# Patient Record
Sex: FEMALE | Race: BLACK OR AFRICAN AMERICAN | NOT HISPANIC OR LATINO | Employment: OTHER | URBAN - METROPOLITAN AREA
[De-identification: names, ages, dates, MRNs, and addresses within clinical notes are randomized per-mention and may not be internally consistent; named-entity substitution may affect disease eponyms.]

---

## 2017-01-16 ENCOUNTER — OFFICE VISIT (OUTPATIENT)
Dept: CARDIOLOGY | Facility: CLINIC | Age: 62
End: 2017-01-16
Payer: COMMERCIAL

## 2017-01-16 VITALS
HEART RATE: 64 BPM | HEIGHT: 64 IN | SYSTOLIC BLOOD PRESSURE: 132 MMHG | WEIGHT: 135.81 LBS | DIASTOLIC BLOOD PRESSURE: 76 MMHG | BODY MASS INDEX: 23.18 KG/M2

## 2017-01-16 DIAGNOSIS — I10 ESSENTIAL HYPERTENSION: ICD-10-CM

## 2017-01-16 PROCEDURE — 99999 PR PBB SHADOW E&M-EST. PATIENT-LVL II: CPT | Mod: PBBFAC,,, | Performed by: INTERNAL MEDICINE

## 2017-01-16 PROCEDURE — 99203 OFFICE O/P NEW LOW 30 MIN: CPT | Mod: S$GLB,,, | Performed by: INTERNAL MEDICINE

## 2017-01-16 PROCEDURE — 1159F MED LIST DOCD IN RCRD: CPT | Mod: S$GLB,,, | Performed by: INTERNAL MEDICINE

## 2017-01-16 PROCEDURE — 3075F SYST BP GE 130 - 139MM HG: CPT | Mod: S$GLB,,, | Performed by: INTERNAL MEDICINE

## 2017-01-16 PROCEDURE — 3078F DIAST BP <80 MM HG: CPT | Mod: S$GLB,,, | Performed by: INTERNAL MEDICINE

## 2017-01-16 RX ORDER — LISINOPRIL AND HYDROCHLOROTHIAZIDE 12.5; 2 MG/1; MG/1
1 TABLET ORAL DAILY
Qty: 90 TABLET | Refills: 3 | Status: SHIPPED | OUTPATIENT
Start: 2017-01-16 | End: 2018-01-22 | Stop reason: SDUPTHER

## 2017-01-16 RX ORDER — METOPROLOL SUCCINATE 50 MG/1
50 TABLET, EXTENDED RELEASE ORAL DAILY
Qty: 90 TABLET | Refills: 3 | Status: SHIPPED | OUTPATIENT
Start: 2017-01-16 | End: 2018-02-02 | Stop reason: SDUPTHER

## 2017-01-16 NOTE — PROGRESS NOTES
Subjective:    Patient ID:  Marga Pak is a 61 y.o. female who presents for evaluation of Mitral Valve Prolapse and Hypertension      HPI  She comes with no complaints, no chest pain, no shortness of breath  Previously seen by cardiologist in [place]  BP well controlled    Review of Systems   Constitution: Negative for decreased appetite, weakness, malaise/fatigue, weight gain and weight loss.   Cardiovascular: Negative for chest pain, dyspnea on exertion, leg swelling, palpitations and syncope.   Respiratory: Negative for cough and shortness of breath.    Gastrointestinal: Negative.    All other systems reviewed and are negative.       Objective:    Physical Exam   Constitutional: She is oriented to person, place, and time. She appears well-developed and well-nourished.   HENT:   Head: Normocephalic.   Eyes: Pupils are equal, round, and reactive to light.   Neck: Normal range of motion. Neck supple. No JVD present. Carotid bruit is not present. No thyromegaly present.   Cardiovascular: Normal rate, regular rhythm, normal heart sounds, intact distal pulses and normal pulses.  PMI is not displaced.  Exam reveals no gallop.    No murmur heard.  Pulmonary/Chest: Effort normal and breath sounds normal.   Abdominal: Soft. Normal appearance. She exhibits no mass. There is no hepatosplenomegaly. There is no tenderness.   Musculoskeletal: Normal range of motion. She exhibits no edema.   Neurological: She is alert and oriented to person, place, and time. She has normal strength and normal reflexes. No sensory deficit.   Skin: Skin is warm and intact.   Psychiatric: She has a normal mood and affect.   Nursing note and vitals reviewed.        Assessment:       1. Essential hypertension         Plan:     Continue all cardiac medications  Regular exercise program  Weight loss  6 m f/u with echo, ekg

## 2017-02-06 RX ORDER — ATORVASTATIN CALCIUM 20 MG/1
20 TABLET, FILM COATED ORAL DAILY
Qty: 90 TABLET | Refills: 3 | Status: SHIPPED | OUTPATIENT
Start: 2017-02-06 | End: 2019-12-31 | Stop reason: SDUPTHER

## 2017-02-16 ENCOUNTER — TELEPHONE (OUTPATIENT)
Dept: OBSTETRICS AND GYNECOLOGY | Facility: CLINIC | Age: 62
End: 2017-02-16

## 2017-02-16 NOTE — TELEPHONE ENCOUNTER
----- Message from Rosaura Pastor sent at 2/16/2017 11:20 AM CST -----  Contact: self: [phone]  Patient is calling because she needs a refill on Premarin 0.9 mg. Patient asks to please call this in for her at:      [email or web address] [address] [place] [address] [facility] [place] [address] [facility] [address]  Phone: [phone] Fax: [phone]    Patient has started sweating again.

## 2017-06-06 RX ORDER — FAMOTIDINE 20 MG/1
TABLET, FILM COATED ORAL
Qty: 30 TABLET | Refills: 0 | Status: SHIPPED | OUTPATIENT
Start: 2017-06-06 | End: 2017-11-14

## 2017-07-21 ENCOUNTER — CLINICAL SUPPORT (OUTPATIENT)
Dept: CARDIOLOGY | Facility: CLINIC | Age: 62
End: 2017-07-21
Payer: COMMERCIAL

## 2017-07-21 DIAGNOSIS — I10 ESSENTIAL HYPERTENSION: ICD-10-CM

## 2017-07-21 LAB
DIASTOLIC DYSFUNCTION: NO
ESTIMATED PA SYSTOLIC PRESSURE: 35.46
MITRAL VALVE REGURGITATION: NORMAL
RETIRED EF AND QEF - SEE NOTES: 70 (ref 55–65)
TRICUSPID VALVE REGURGITATION: NORMAL

## 2017-07-21 PROCEDURE — 93306 TTE W/DOPPLER COMPLETE: CPT | Mod: S$GLB,,, | Performed by: INTERNAL MEDICINE

## 2017-07-25 ENCOUNTER — TELEPHONE (OUTPATIENT)
Dept: CARDIOLOGY | Facility: CLINIC | Age: 62
End: 2017-07-25

## 2017-07-25 DIAGNOSIS — I10 ESSENTIAL HYPERTENSION: Primary | ICD-10-CM

## 2017-07-25 NOTE — TELEPHONE ENCOUNTER
----- Message from Leilani Alvarez sent at 7/25/2017 11:34 AM CDT -----  Contact: self  Patient called regarding missed call. Please contact [phone] (prsr)

## 2017-11-14 ENCOUNTER — HOSPITAL ENCOUNTER (OUTPATIENT)
Dept: RADIOLOGY | Facility: HOSPITAL | Age: 62
Discharge: HOME | End: 2017-11-14
Attending: SPECIALIST
Payer: COMMERCIAL

## 2017-11-14 ENCOUNTER — OFFICE VISIT (OUTPATIENT)
Dept: OBSTETRICS AND GYNECOLOGY | Facility: CLINIC | Age: 62
End: 2017-11-14
Payer: COMMERCIAL

## 2017-11-14 VITALS — HEIGHT: 64 IN | BODY MASS INDEX: 24.24 KG/M2 | WEIGHT: 142 LBS

## 2017-11-14 VITALS — BODY MASS INDEX: 24.35 KG/M2 | WEIGHT: 142.63 LBS | HEIGHT: 64 IN

## 2017-11-14 DIAGNOSIS — Z12.31 VISIT FOR SCREENING MAMMOGRAM: ICD-10-CM

## 2017-11-14 DIAGNOSIS — Z12.31 VISIT FOR SCREENING MAMMOGRAM: Primary | ICD-10-CM

## 2017-11-14 DIAGNOSIS — Z80.3 FAMILY HISTORY OF BREAST CANCER: ICD-10-CM

## 2017-11-14 PROCEDURE — 99999 PR PBB SHADOW E&M-EST. PATIENT-LVL III: CPT | Mod: PBBFAC,,, | Performed by: SPECIALIST

## 2017-11-14 PROCEDURE — 77067 SCR MAMMO BI INCL CAD: CPT | Mod: 26,,, | Performed by: RADIOLOGY

## 2017-11-14 PROCEDURE — 77063 BREAST TOMOSYNTHESIS BI: CPT | Mod: 26,,, | Performed by: RADIOLOGY

## 2017-11-14 PROCEDURE — 99213 OFFICE O/P EST LOW 20 MIN: CPT | Mod: S$GLB,,, | Performed by: SPECIALIST

## 2017-11-14 PROCEDURE — 77067 SCR MAMMO BI INCL CAD: CPT | Mod: TC

## 2017-11-14 RX ORDER — LEVOCETIRIZINE DIHYDROCHLORIDE 5 MG/1
TABLET, FILM COATED ORAL
Refills: 6 | COMMUNITY
Start: 2017-11-07

## 2017-11-14 RX ORDER — PROMETHAZINE HYDROCHLORIDE AND CODEINE PHOSPHATE 6.25; 1 MG/5ML; MG/5ML
SOLUTION ORAL
COMMUNITY
Start: 2017-11-07 | End: 2018-05-22

## 2017-11-14 RX ORDER — CEPHALEXIN 500 MG/1
CAPSULE ORAL
COMMUNITY
Start: 2017-11-07 | End: 2018-05-22

## 2017-11-14 NOTE — PROGRESS NOTES
63 yo BF presents for annual gyn evaluation. No overt gyn complaints.  Significant family history breast Ca sister.  I discussed screening ammo and breast density and possible indication for breast MRI due to family history.    Past Medical History:   Diagnosis Date    Colon torsion     Gastroparesis     GERD (gastroesophageal reflux disease)     Hyperlipidemia     Hypertension        Past Surgical History:   Procedure Laterality Date    ABDOMINAL SURGERY      APPENDECTOMY      HYSTERECTOMY      OOPHORECTOMY         Family History   Problem Relation Age of Onset    Breast cancer Sister 58    Cancer Maternal Grandmother      stomach    Ovarian cancer Maternal Grandmother        Social History     Social History    Marital status:      Spouse name: N/A    Number of children: N/A    Years of education: N/A     Social History Main Topics    Smoking status: Never Smoker    Smokeless tobacco: Never Used    Alcohol use No    Drug use: No    Sexual activity: Not Asked     Other Topics Concern    None     Social History Narrative    None       Current Outpatient Prescriptions   Medication Sig Dispense Refill    atorvastatin (LIPITOR) 20 MG tablet Take 1 tablet (20 mg total) by mouth once daily. 90 tablet 3    estrogens, conjugated, (PREMARIN) 0.9 MG Tab Take 1 tablet (0.9 mg total) by mouth once daily. 90 tablet 2    lisinopril-hydrochlorothiazide (PRINZIDE,ZESTORETIC) 20-12.5 mg per tablet Take 1 tablet by mouth once daily. 90 tablet 3    metoprolol succinate (TOPROL-XL) 50 MG 24 hr tablet Take 1 tablet (50 mg total) by mouth once daily. 90 tablet 3    cephALEXin (KEFLEX) 500 MG capsule       levocetirizine (XYZAL) 5 MG tablet TK 1 T PO  QAM PRF SINUS ALLERGY OR DRIP  6    promethazine-codeine 6.25-10 mg/5 ml (PHENERGAN WITH CODEINE) 6.25-10 mg/5 mL syrup        No current facility-administered medications for this visit.        Review of patient's allergies indicates:   Allergen  Reactions    Bee sting [allergen ext-venom-honey bee] Swelling    Iodine and iodide containing products Palpitations       Review of System:   General: no chills, fever, night sweats, weight gain or weight loss  Psychological: no depression or suicidal ideation  Breasts: no new or changing breast lumps, nipple discharge or masses.  Respiratory: no cough, shortness of breath, or wheezing  Cardiovascular: no chest pain or dyspnea on exertion  Gastrointestinal: no abdominal pain, change in bowel habits, or black or bloody stools  Genito-Urinary: no incontinence, urinary frequency/urgency or vulvar/vaginal symptoms, pelvic pain or abnormal vaginal bleeding.  Musculoskeletal: no gait disturbance or muscular weakness    PE:   APPEARANCE: Well nourished, well developed, in no acute distress.  NECK: Neck symmetric without masses or thyromegaly.  NODES: No inguinal lymph node enlargement.  ABDOMEN: Soft. No tenderness or masses. No hepatosplenomegaly. No hernias.  BREASTS: Symmetrical, no skin changes or visible lesions. No palpable masses, nipple discharge or adenopathy bilaterally.  PELVIC: Normal external female genitalia without lesions. Normal hair distribution. Adequate perineal body, normal urethral meatus. Vagina moist and well rugated without lesions or discharge. No significant cystocele or rectocele. Uterus and cervix surgically absent. Bimanual exam revealed no masses, tenderness or abnormality.      Plan Mammo screening today  I will order breast MRI if inconclusive screening mammogram today  Discussed monthly BSE  RTO 1 year/prn

## 2017-11-16 ENCOUNTER — TELEPHONE (OUTPATIENT)
Dept: RADIOLOGY | Facility: HOSPITAL | Age: 62
End: 2017-11-16

## 2017-11-21 ENCOUNTER — HOSPITAL ENCOUNTER (OUTPATIENT)
Dept: RADIOLOGY | Facility: HOSPITAL | Age: 62
Discharge: HOME | End: 2017-11-21
Attending: SPECIALIST
Payer: COMMERCIAL

## 2017-11-21 DIAGNOSIS — R92.8 ABNORMAL MAMMOGRAM OF LEFT BREAST: ICD-10-CM

## 2017-11-21 PROCEDURE — 76642 ULTRASOUND BREAST LIMITED: CPT | Mod: 26,LT,, | Performed by: RADIOLOGY

## 2017-11-21 PROCEDURE — 77061 BREAST TOMOSYNTHESIS UNI: CPT | Mod: 26,LT,, | Performed by: RADIOLOGY

## 2017-11-21 PROCEDURE — 77061 BREAST TOMOSYNTHESIS UNI: CPT | Mod: TC,LT

## 2017-11-21 PROCEDURE — 76642 ULTRASOUND BREAST LIMITED: CPT | Mod: TC,PO,LT

## 2017-11-21 PROCEDURE — 77065 DX MAMMO INCL CAD UNI: CPT | Mod: 26,LT,, | Performed by: RADIOLOGY

## 2018-01-22 RX ORDER — LISINOPRIL AND HYDROCHLOROTHIAZIDE 12.5; 2 MG/1; MG/1
TABLET ORAL
Qty: 90 TABLET | Refills: 0 | Status: SHIPPED | OUTPATIENT
Start: 2018-01-22 | End: 2018-08-22 | Stop reason: SDUPTHER

## 2018-02-02 RX ORDER — METOPROLOL SUCCINATE 50 MG/1
TABLET, EXTENDED RELEASE ORAL
Qty: 90 TABLET | Refills: 0 | Status: SHIPPED | OUTPATIENT
Start: 2018-02-02 | End: 2018-05-22 | Stop reason: SDUPTHER

## 2018-05-22 ENCOUNTER — OFFICE VISIT (OUTPATIENT)
Dept: CARDIOLOGY | Facility: CLINIC | Age: 63
End: 2018-05-22
Payer: COMMERCIAL

## 2018-05-22 VITALS
HEART RATE: 68 BPM | SYSTOLIC BLOOD PRESSURE: 152 MMHG | BODY MASS INDEX: 24.46 KG/M2 | HEIGHT: 64 IN | WEIGHT: 143.31 LBS | DIASTOLIC BLOOD PRESSURE: 82 MMHG

## 2018-05-22 DIAGNOSIS — I10 ESSENTIAL HYPERTENSION: Primary | ICD-10-CM

## 2018-05-22 DIAGNOSIS — E78.2 MIXED HYPERLIPIDEMIA: ICD-10-CM

## 2018-05-22 PROBLEM — E78.5 HYPERLIPIDEMIA: Status: ACTIVE | Noted: 2018-05-22

## 2018-05-22 PROCEDURE — 3077F SYST BP >= 140 MM HG: CPT | Mod: CPTII,S$GLB,, | Performed by: INTERNAL MEDICINE

## 2018-05-22 PROCEDURE — 99999 PR PBB SHADOW E&M-EST. PATIENT-LVL II: CPT | Mod: PBBFAC,,, | Performed by: INTERNAL MEDICINE

## 2018-05-22 PROCEDURE — 3008F BODY MASS INDEX DOCD: CPT | Mod: CPTII,S$GLB,, | Performed by: INTERNAL MEDICINE

## 2018-05-22 PROCEDURE — 99214 OFFICE O/P EST MOD 30 MIN: CPT | Mod: S$GLB,,, | Performed by: INTERNAL MEDICINE

## 2018-05-22 PROCEDURE — 3079F DIAST BP 80-89 MM HG: CPT | Mod: CPTII,S$GLB,, | Performed by: INTERNAL MEDICINE

## 2018-05-22 RX ORDER — METOPROLOL SUCCINATE 50 MG/1
TABLET, EXTENDED RELEASE ORAL
Qty: 90 TABLET | Refills: 3 | Status: SHIPPED | OUTPATIENT
Start: 2018-05-22 | End: 2020-02-13

## 2018-05-22 NOTE — PROGRESS NOTES
Subjective:    Patient ID:  Marga Pak is a 63 y.o. female who presents for follow-up of hypertension    HPI  She comes with no complaints, no chest pain, no shortness of breath  Off toprol last 3 weeks    Review of Systems   Constitution: Negative for decreased appetite, weakness, malaise/fatigue, weight gain and weight loss.   Cardiovascular: Negative for chest pain, dyspnea on exertion, leg swelling, palpitations and syncope.   Respiratory: Negative for cough and shortness of breath.    Gastrointestinal: Negative.    All other systems reviewed and are negative.       Objective:    Physical Exam   Constitutional: She is oriented to person, place, and time. She appears well-developed and well-nourished.   HENT:   Head: Normocephalic.   Eyes: Pupils are equal, round, and reactive to light.   Neck: Normal range of motion. Neck supple. No JVD present. Carotid bruit is not present. No thyromegaly present.   Cardiovascular: Normal rate, regular rhythm, normal heart sounds, intact distal pulses and normal pulses.  PMI is not displaced.  Exam reveals no gallop.    No murmur heard.  Pulmonary/Chest: Effort normal and breath sounds normal.   Abdominal: Soft. Normal appearance. She exhibits no mass. There is no hepatosplenomegaly. There is no tenderness.   Musculoskeletal: Normal range of motion. She exhibits no edema.   Neurological: She is alert and oriented to person, place, and time. She has normal strength and normal reflexes. No sensory deficit.   Skin: Skin is warm and intact.   Psychiatric: She has a normal mood and affect.   Nursing note and vitals reviewed.        Assessment:       1. Essential hypertension    2. Mixed hyperlipidemia         Plan:     Continue all cardiac medications  Regular exercise program  Resume toprol  Follow up in 1 yr

## 2018-08-01 ENCOUNTER — HOSPITAL ENCOUNTER (EMERGENCY)
Facility: HOSPITAL | Age: 63
Discharge: HOME | End: 2018-08-01
Attending: EMERGENCY MEDICINE
Payer: COMMERCIAL

## 2018-08-01 VITALS
SYSTOLIC BLOOD PRESSURE: 152 MMHG | WEIGHT: 137 LBS | DIASTOLIC BLOOD PRESSURE: 68 MMHG | RESPIRATION RATE: 20 BRPM | HEIGHT: 64 IN | BODY MASS INDEX: 23.39 KG/M2 | OXYGEN SATURATION: 100 % | HEART RATE: 66 BPM | TEMPERATURE: 98 F

## 2018-08-01 DIAGNOSIS — M25.562 PAIN AND SWELLING OF LEFT KNEE: Primary | ICD-10-CM
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
## 2018-08-01 DIAGNOSIS — M25.462 PAIN AND SWELLING OF LEFT KNEE: Primary | ICD-10-CM

## 2018-08-01 PROCEDURE — 99283 EMERGENCY DEPT VISIT LOW MDM: CPT | Mod: ,,, | Performed by: EMERGENCY MEDICINE

## 2018-08-01 PROCEDURE — 99283 EMERGENCY DEPT VISIT LOW MDM: CPT | Mod: 25

## 2018-08-01 PROCEDURE — 25000003 PHARM REV CODE 250: Performed by: EMERGENCY MEDICINE

## 2018-08-01 RX ORDER — BACITRACIN ZINC 500 UNIT/G
OINTMENT IN PACKET (EA) TOPICAL
Status: COMPLETED | OUTPATIENT
Start: 2018-08-01 | End: 2018-08-01

## 2018-08-01 RX ADMIN — BACITRACIN ZINC: 500 OINTMENT TOPICAL at 09:08

## 2018-08-02 NOTE — ED PROVIDER NOTES
Encounter Date: 8/1/2018    SCRIBE #1 NOTE: I, Michelle Otto, am scribing for, and in the presence of,  Dr. Hong. I have scribed the following portions of the note - Other sections scribed: HPI ROS PE MDM.       History     Chief Complaint   Patient presents with    Fall     Pt fell off a ladder earlier today and fell onto her left side. Pt reports left knee pain. 1inch laceration noted to left knee with swelling to the area. Pt unable to bend knee.      Time patient was seen by the provider: 9:20 PM      The patient is a 63 y.o. female who presents to the ED after she fell off of a 3 foot ladder earlier today while cleaning leaves off of her roof and landed on her left side, but did not hit the ground or have any LOC. Injury occurred at about 1830. Patient reports left knee pain with a 1 inch superficial abrasion/laceration noted to the left knee with mild associated edema. Patient states she is unable to bend the knee completely without some discomfort. Patient is able to ambulate but reports soreness and swelling. No family history of blood clots. Pain rated at 4/10 worse with ambulation and direct palpation of her knee. There is no open fracture.       The history is provided by the patient and medical records.     Review of patient's allergies indicates:   Allergen Reactions    Bee sting [allergen ext-venom-honey bee] Swelling    Iodine and iodide containing products Palpitations     Past Medical History:   Diagnosis Date    Colon torsion     Gastroparesis     GERD (gastroesophageal reflux disease)     Hyperlipidemia     Hypertension      Past Surgical History:   Procedure Laterality Date    ABDOMINAL SURGERY      APPENDECTOMY      HYSTERECTOMY      OOPHORECTOMY       Family History   Problem Relation Age of Onset    Breast cancer Sister 58    Cancer Maternal Grandmother         stomach    Ovarian cancer Maternal Grandmother      Social History   Substance Use Topics    Smoking status:  Never Smoker    Smokeless tobacco: Never Used    Alcohol use No     Review of Systems   Constitutional: Negative.    HENT: Negative.    Respiratory: Negative.    Cardiovascular: Negative.    Gastrointestinal: Negative.    Genitourinary: Negative.    Musculoskeletal:        Left knee edema and pain   Skin:        1 inch abrasion to left knee    Neurological: Negative.    Psychiatric/Behavioral: Negative.        Physical Exam     Initial Vitals [08/01/18 2057]   BP Pulse Resp Temp SpO2   (!) 152/68 66 20 98.4 °F (36.9 °C) 100 %      MAP       --         Physical Exam    Nursing note and vitals reviewed.    Gen/Constitutional: Interactive. No acute distress  Head: Normocephalic, Atraumatic  Neck: supple, no masses or LAD  Eyes: PERRLA, conjunctiva clear  Ears, Nose and Throat: No rhinorrhea or stridor.  Cardiac: Reg Rhythm, No murmur  Pulmonary: CTA Bilat, no wheezes, rhonchi, rales.  GI: Abdomen soft, non-tender, non-distended; no rebound or guarding  : No CVA tenderness.  Musculoskeletal: Mild pain with knee flexion, no pain along the lateral or medial joint line, + soft tissue swelling; Negative Lachman, Negative anterior and posterior drawer tests. No patellar instability. No midline cervical, thoracic or lumbar spinal tenderness.   Skin: Superficial abrasion to left knee  Neuro: Alert and Oriented x 3; No focal motor or sensory deficits.    Psych: Normal affect    ED Course   Procedures     Imaging Results          X-Ray Knee 3 View Left (Final result)  Result time 08/01/18 22:30:50    Final result by Simone Guadarrama MD (08/01/18 22:30:50)                 Impression:      No acute fracture.      Electronically signed by: Simone Guadarrama MD  Date:    08/01/2018  Time:    22:30             Narrative:    EXAMINATION:  XR KNEE 3 VIEW LEFT    CLINICAL HISTORY:  Pain in left knee    TECHNIQUE:  AP, lateral, and Merchant views of the left knee were performed.    COMPARISON:  None    FINDINGS:  No fracture or  dislocation.  Enthesopathy at the quadriceps tendon insertion on the superior patella.  No joint effusion.  Mild tricompartment degenerative change.                                 Medical Decision Making:   History:   Old Medical Records: I decided to obtain old medical records.  Initial Assessment:   63 year old female presents after fall with superficial laceration, edema, and pain to left knee  Differential Diagnosis:   My differential diagnosis includes fracture, dislocation, contusion, laceration, sprain  Clinical Tests:   Radiological Study: Ordered and Reviewed    This is a well appearing female no acute distress. Vital signs stable with no acute traumatic injuries. Afebrile, with no bony deformities. There is a small abrasion across the left which is superficial and does not need repair. Negative Lachmans's, anterior and posterior drawer test. There is some swelling but able to fully extend and flex the left knee. X-rays of the left knee were obtained with no acute fracture or dislocation. No patellar instability. Patient was given instructions for rest, ice, compression and elevation along with OTC Tylenol/ibuprofen for pain management. A referral for orthopedic surgery/sports medicine was given if her symptoms do not improve in the next few days. Patient agreeable to discharge plan. Strict ED precautions and return instructions discussed at length and patient verbalized understanding. All questions were answered and ample time was given for questions.  Patient was given crutches and teaching prior to discharge.               Scribe Attestation:   Scribe #1: I performed the above scribed service and the documentation accurately describes the services I performed. I attest to the accuracy of the note.    I, Dr. Ryland Hong, personally performed the services described in this documentation. All medical record entries made by the scribe were at my direction and in my presence.  I have reviewed the chart  and agree that the record reflects my personal performance and is accurate and complete.              Clinical Impression:   The primary encounter diagnosis was Pain and swelling of left knee. A diagnosis of Fall from ladder, initial encounter was also pertinent to this visit.      Disposition:   Disposition: Discharged  Condition: Stable       Ryland Hong DO  Dept of Emergency Medicine   [facility]  Spectralink: [id number]                   Ryland Hong DO  08/02/18 8294

## 2018-08-02 NOTE — ED TRIAGE NOTES
Marga Pak, a 63 y.o. female presents to the ED      Chief Complaint   Patient presents with    Fall     Pt fell off a ladder earlier today and fell onto her left side. Pt reports left knee pain. 1inch laceration noted to left knee with swelling to the area. Pt unable to bend knee.      Review of patient's allergies indicates:   Allergen Reactions    Bee sting [allergen ext-venom-honey bee] Swelling    Iodine and iodide containing products Palpitations     Past Medical History:   Diagnosis Date    Colon torsion     Gastroparesis     GERD (gastroesophageal reflux disease)     Hyperlipidemia     Hypertension

## 2018-08-02 NOTE — DISCHARGE INSTRUCTIONS
Use ice, anti-inflammatory medications such as Tylenol or ibuprofen for the next 3-4 days as well as elevate your extremity and use compression as needed.  Follow up with Orthopedic surgery if your symptoms do not improve this week.  If you have any worsening symptoms, more swelling, fevers, chills or inability to walk, return to the emergency department.

## 2018-08-06 ENCOUNTER — OFFICE VISIT (OUTPATIENT)
Dept: ORTHOPEDICS | Facility: CLINIC | Age: 63
End: 2018-08-06
Payer: COMMERCIAL

## 2018-08-06 VITALS — WEIGHT: 137 LBS | BODY MASS INDEX: 23.39 KG/M2 | HEIGHT: 64 IN

## 2018-08-06 DIAGNOSIS — S80.02XA CONTUSION OF LEFT KNEE, INITIAL ENCOUNTER: Primary | ICD-10-CM

## 2018-08-06 PROCEDURE — 99203 OFFICE O/P NEW LOW 30 MIN: CPT | Mod: S$GLB,,, | Performed by: ORTHOPAEDIC SURGERY

## 2018-08-06 PROCEDURE — 3008F BODY MASS INDEX DOCD: CPT | Mod: CPTII,S$GLB,, | Performed by: ORTHOPAEDIC SURGERY

## 2018-08-06 PROCEDURE — 99999 PR PBB SHADOW E&M-EST. PATIENT-LVL II: CPT | Mod: PBBFAC,,, | Performed by: ORTHOPAEDIC SURGERY

## 2018-08-07 NOTE — PROGRESS NOTES
DATE: 8/6/2018  PATIENT: Marga Pak  REFERRING MD:  CHIEF COMPLAINT:   Chief Complaint   Patient presents with    Left Knee - Pain       HISTORY:  Marga Pak is a 63 y.o. female  who presents for initial evaluation of left knee injury.  States she is well until 08/01/2018 when she fell 8 ft off a roof.  She landed on her leg but is not sure exactly how she landed.  She went to the emergency room on 08/02/2018.  X-rays were negative for fracture. Orthopedic follow-up was recommended.  She is able to weightbear but with pain. She noted swelling medially. She notes pain with movement.  She notes pain with standing for prolonged period of time. She notes an abrasion anteriorly as far swelling. Pain is reported at 4/10.  She is employed as an /contractor.  States her pain worsens at work.  She denies any previous injuries to her leg.       PAST MEDICAL/SURGICAL HISTORY:  Past Medical History:   Diagnosis Date    Colon torsion     Gastroparesis     GERD (gastroesophageal reflux disease)     Hyperlipidemia     Hypertension      Past Surgical History:   Procedure Laterality Date    ABDOMINAL SURGERY      APPENDECTOMY      HYSTERECTOMY      OOPHORECTOMY         Current Medications:   Current Outpatient Prescriptions:     atorvastatin (LIPITOR) 20 MG tablet, Take 1 tablet (20 mg total) by mouth once daily., Disp: 90 tablet, Rfl: 3    estrogens, conjugated, (PREMARIN) 0.9 MG Tab, Take 1 tablet (0.9 mg total) by mouth once daily., Disp: 90 tablet, Rfl: 2    levocetirizine (XYZAL) 5 MG tablet, TK 1 T PO  QAM PRF SINUS ALLERGY OR DRIP, Disp: , Rfl: 6    lisinopril-hydrochlorothiazide (PRINZIDE,ZESTORETIC) 20-12.5 mg per tablet, TAKE 1 TABLET BY MOUTH EVERY DAY, Disp: 90 tablet, Rfl: 0    metoprolol succinate (TOPROL-XL) 50 MG 24 hr tablet, TAKE 1 TABLET(50 MG) BY MOUTH EVERY DAY, Disp: 90 tablet, Rfl: 3    Family History: family history was reviewed and is noncontributory  Social  "History:   Social History     Social History    Marital status:      Spouse name: N/A    Number of children: N/A    Years of education: N/A     Occupational History    Not on file.     Social History Main Topics    Smoking status: Never Smoker    Smokeless tobacco: Never Used    Alcohol use No    Drug use: No    Sexual activity: Not on file     Other Topics Concern    Not on file     Social History Narrative    No narrative on file       ROS:  Constitution: Negative for chills, fever, and sweats. Negative for unexplained weight loss.  HENT: Negative for headaches and blurry vision.   Cardiovascular: Negative for chest pain, irregular heartbeat, leg swelling and palpitations.   Respiratory: Negative for cough and shortness of breath.   Gastrointestinal: Negative for abdominal pain, heartburn, nausea and vomiting.   Genitourinary: Negative for bladder incontinence and dysuria.   Musculoskeletal: Negative for systemic arthritis, muscle weakness and myalgias.   Neurological: Negative for numbness.   Psychiatric/Behavioral: Negative for depression.  Endocrine: Negative for polyuria.   Hematologic/Lymphatic: Negative for bleeding disorders.   Skin: Negative for poor wound healing.        PHYSICAL EXAM:  Ht 5' 4" (1.626 m)   Wt 62.1 kg (137 lb)   BMI 23.52 kg/m²   Marga Pak is a well developed, well nourished female in no acute distress. Physical examination of the left knee evaluated the following:    Gait and Alignment  Inspection for ecchymosis, swelling, atrophy, or deformity  Inspection for intra-articular and/or bursal effusions  Tenderness to palpation over the bony and soft tissue structures around the knee  Range of Motion and presence of extensor lag/contractures  Sensation and motor strength  Varus/valgus or anterior/posterior/rotatory instability  Flexion pinch and Mira's Tests  Patellar alignment/tracking/pain to palpation  Vascular exam to include skin " temperature/color/capillary refill    Remarkable findings included:  Healing abrasion about the anterior knee.  There is moderate size hematoma just lateral to the tibial tubercle. No effusion of the knee. Range of motion 0-130 degrees of flexion. No instability to varus or valgus stress.  Lachman's and anterior drawer are negative.  Flexion pinch produces anterior tightness.  Mira's test negative        IMAGING:   X-rays of the left knee are personally reviewed.  No acute fractures or dislocations are seen. No significant degenerative changes noted. No calcifications or loose bodies identified      ASSESSMENT:   Contusion left knee    PLAN:  The nature of the diagnosis, using models and diagrams when appropriate, was explained to the patient in detail.Treatment option discussed included observation, physical therapy, MRI.. All questions answered and the patient wishes to observe her symptoms over the next 2 weeks.  Should she continue to remain symptomatic, she will contact the office for repeat exam mortise schedule an MRI of the left knee to rule out intra-articular pathology.  Follow up if not improving or worse..      This note was dictated using voice recognition software. Please excuse any grammatical or typographical errors.

## 2018-08-23 ENCOUNTER — OFFICE VISIT (OUTPATIENT)
Dept: ORTHOPEDICS | Facility: CLINIC | Age: 63
End: 2018-08-23
Payer: COMMERCIAL

## 2018-08-23 VITALS — HEIGHT: 64 IN | BODY MASS INDEX: 23.39 KG/M2 | WEIGHT: 137 LBS

## 2018-08-23 DIAGNOSIS — S80.02XD CONTUSION OF LEFT KNEE, SUBSEQUENT ENCOUNTER: Primary | ICD-10-CM

## 2018-08-23 PROCEDURE — 99213 OFFICE O/P EST LOW 20 MIN: CPT | Mod: S$GLB,,, | Performed by: ORTHOPAEDIC SURGERY

## 2018-08-23 PROCEDURE — 99999 PR PBB SHADOW E&M-EST. PATIENT-LVL II: CPT | Mod: PBBFAC,,, | Performed by: ORTHOPAEDIC SURGERY

## 2018-08-23 PROCEDURE — 3008F BODY MASS INDEX DOCD: CPT | Mod: CPTII,S$GLB,, | Performed by: ORTHOPAEDIC SURGERY

## 2018-08-23 RX ORDER — LISINOPRIL AND HYDROCHLOROTHIAZIDE 12.5; 2 MG/1; MG/1
TABLET ORAL
Qty: 90 TABLET | Refills: 0 | Status: SHIPPED | OUTPATIENT
Start: 2018-08-23 | End: 2018-11-20 | Stop reason: SDUPTHER

## 2018-08-23 NOTE — PROGRESS NOTES
"DATE: 8/23/2018  PATIENT: Marga Pak    Attending Physician: Jan Watt M.D.    HISTORY:  Marga Pak is a 63 y.o. female who returns for follow up evaluation of  her left knee. She sustained a contusion on 08/01/2018 when she fell 8 ft off a roof.  She does report improvement.  However she still notes mild swelling anteriorly.  She cannot kneel however she is walking more comfortably.  Pain is reported at 2/10 today.    PMH/PSH/FamHx/SocHx:  Reviewed and unchanged from prior visit    ROS:  Constitution: Negative for chills, fever, and sweats. Negative for unexplained weight loss.  HENT: Negative for headaches and blurry vision.   Cardiovascular: Negative for chest pain, irregular heartbeat, leg swelling and palpitations.   Respiratory: Negative for cough and shortness of breath.   Gastrointestinal: Negative for abdominal pain, heartburn, nausea and vomiting.   Genitourinary: Negative for bladder incontinence and dysuria.   Musculoskeletal: Negative for systemic arthritis, joint swelling, muscle weakness and myalgias.   Neurological: Negative for numbness.   Psychiatric/Behavioral: Negative for depression.   Endocrine: Negative for polyuria.   Hematologic/Lymphatic: Negative for bleeding disorders.  Skin: Negative for poor wound healing.       EXAM:  Ht 5' 4" (1.626 m)   Wt 62.1 kg (137 lb)   BMI 23.52 kg/m²   Marga Pak is a well developed, well nourished female in no acute distress. Physical examination of the left knee evaluated the following:    Gait and Alignment  Inspection for ecchymosis, swelling, atrophy, or deformity  Inspection for intra-articular and/or bursal effusions  Tenderness to palpation over the bony and soft tissue structures around the knee  Range of Motion and presence of extensor lag/contractures  Sensation and motor strength  Varus/valgus or anterior/posterior/rotatory instability  Flexion pinch and Mira's Tests  Patellar alignment/tracking/pain to " palpation  Vascular exam to include skin temperature/color/capillary refill    Remarkable findings included:  Abrasion is healing.  There is small infrapatellar bursal effusion. Range of motion the knee is full to flexion extension. No intra-articular effusion noted.  Mild tenderness palpation anteriorly over the infrapatellar bursa is identified.      IMAGING:   No x-rays are performed today.    ASSESSMENT:  Contusion left knee, D.O. I 08/01/2018    PLAN:  The implications of the patient's evolution of symptoms and findings were explained to the patient in detail. Baby is doing better.  I have explained that it can take another 2-4 weeks for the infrapatellar bursal effusion to it resolved.  However she may perform activities as tolerated.  I would go recommend that she avoid kneeling for another 2-4 weeks.  Should she have further problems I will see her back.  Otherwise, follow-up as needed    This note was dictated using voice recognition software. Please excuse any grammatical or typographical errors.

## 2018-11-02 ENCOUNTER — HOSPITAL ENCOUNTER (EMERGENCY)
Facility: OTHER | Age: 63
Discharge: HOME | End: 2018-11-02
Attending: EMERGENCY MEDICINE
Payer: COMMERCIAL

## 2018-11-02 VITALS
OXYGEN SATURATION: 98 % | DIASTOLIC BLOOD PRESSURE: 61 MMHG | SYSTOLIC BLOOD PRESSURE: 130 MMHG | RESPIRATION RATE: 18 BRPM | WEIGHT: 140 LBS | BODY MASS INDEX: 23.9 KG/M2 | HEIGHT: 64 IN | TEMPERATURE: 98 F | HEART RATE: 64 BPM

## 2018-11-02 DIAGNOSIS — R11.2 NON-INTRACTABLE VOMITING WITH NAUSEA, UNSPECIFIED VOMITING TYPE: Primary | ICD-10-CM

## 2018-11-02 LAB
ALBUMIN SERPL BCP-MCNC: 4.5 G/DL
ALP SERPL-CCNC: 64 U/L
ALT SERPL W/O P-5'-P-CCNC: 12 U/L
ANION GAP SERPL CALC-SCNC: 13 MMOL/L
AST SERPL-CCNC: 25 U/L
BASOPHILS # BLD AUTO: 0.04 K/UL
BASOPHILS NFR BLD: 0.3 %
BILIRUB SERPL-MCNC: 0.6 MG/DL
BUN SERPL-MCNC: 17 MG/DL
CALCIUM SERPL-MCNC: 10.1 MG/DL
CHLORIDE SERPL-SCNC: 100 MMOL/L
CO2 SERPL-SCNC: 26 MMOL/L
CREAT SERPL-MCNC: 0.8 MG/DL
DIFFERENTIAL METHOD: ABNORMAL
EOSINOPHIL # BLD AUTO: 0 K/UL
EOSINOPHIL NFR BLD: 0.1 %
ERYTHROCYTE [DISTWIDTH] IN BLOOD BY AUTOMATED COUNT: 12.8 %
EST. GFR  (AFRICAN AMERICAN): >60 ML/MIN/1.73 M^2
EST. GFR  (NON AFRICAN AMERICAN): >60 ML/MIN/1.73 M^2
GLUCOSE SERPL-MCNC: 105 MG/DL
HCT VFR BLD AUTO: 46.9 %
HGB BLD-MCNC: 15.9 G/DL
LIPASE SERPL-CCNC: 11 U/L
LYMPHOCYTES # BLD AUTO: 1.8 K/UL
LYMPHOCYTES NFR BLD: 13.1 %
MCH RBC QN AUTO: 31.2 PG
MCHC RBC AUTO-ENTMCNC: 33.9 G/DL
MCV RBC AUTO: 92 FL
MONOCYTES # BLD AUTO: 0.3 K/UL
MONOCYTES NFR BLD: 2.4 %
NEUTROPHILS # BLD AUTO: 11.5 K/UL
NEUTROPHILS NFR BLD: 83.7 %
PLATELET # BLD AUTO: 281 K/UL
PMV BLD AUTO: 10.5 FL
POTASSIUM SERPL-SCNC: 3.5 MMOL/L
PROT SERPL-MCNC: 8.3 G/DL
RBC # BLD AUTO: 5.1 M/UL
SODIUM SERPL-SCNC: 139 MMOL/L
WBC # BLD AUTO: 13.77 K/UL

## 2018-11-02 PROCEDURE — 96361 HYDRATE IV INFUSION ADD-ON: CPT

## 2018-11-02 PROCEDURE — 96375 TX/PRO/DX INJ NEW DRUG ADDON: CPT

## 2018-11-02 PROCEDURE — 96372 THER/PROPH/DIAG INJ SC/IM: CPT | Mod: 59

## 2018-11-02 PROCEDURE — 25000003 PHARM REV CODE 250: Performed by: EMERGENCY MEDICINE

## 2018-11-02 PROCEDURE — 80053 COMPREHEN METABOLIC PANEL: CPT

## 2018-11-02 PROCEDURE — 83690 ASSAY OF LIPASE: CPT

## 2018-11-02 PROCEDURE — 99284 EMERGENCY DEPT VISIT MOD MDM: CPT | Mod: 25

## 2018-11-02 PROCEDURE — 63600175 PHARM REV CODE 636 W HCPCS: Performed by: EMERGENCY MEDICINE

## 2018-11-02 PROCEDURE — 96374 THER/PROPH/DIAG INJ IV PUSH: CPT

## 2018-11-02 PROCEDURE — 85025 COMPLETE CBC W/AUTO DIFF WBC: CPT

## 2018-11-02 RX ORDER — ONDANSETRON 2 MG/ML
8 INJECTION INTRAMUSCULAR; INTRAVENOUS ONCE
Status: COMPLETED | OUTPATIENT
Start: 2018-11-02 | End: 2018-11-02

## 2018-11-02 RX ORDER — PROMETHAZINE HYDROCHLORIDE 25 MG/1
25 SUPPOSITORY RECTAL EVERY 6 HOURS PRN
Qty: 10 SUPPOSITORY | Refills: 0 | Status: ON HOLD | OUTPATIENT
Start: 2018-11-02 | End: 2020-11-12 | Stop reason: HOSPADM

## 2018-11-02 RX ORDER — KETOROLAC TROMETHAMINE 30 MG/ML
15 INJECTION, SOLUTION INTRAMUSCULAR; INTRAVENOUS
Status: COMPLETED | OUTPATIENT
Start: 2018-11-02 | End: 2018-11-02

## 2018-11-02 RX ORDER — ONDANSETRON 4 MG/1
4 TABLET, ORALLY DISINTEGRATING ORAL EVERY 6 HOURS PRN
Qty: 21 TABLET | Refills: 0 | Status: ON HOLD | OUTPATIENT
Start: 2018-11-02 | End: 2020-11-12 | Stop reason: HOSPADM

## 2018-11-02 RX ORDER — DICYCLOMINE HYDROCHLORIDE 10 MG/ML
20 INJECTION INTRAMUSCULAR
Status: COMPLETED | OUTPATIENT
Start: 2018-11-02 | End: 2018-11-02

## 2018-11-02 RX ADMIN — ONDANSETRON 8 MG: 2 INJECTION INTRAMUSCULAR; INTRAVENOUS at 10:11

## 2018-11-02 RX ADMIN — DICYCLOMINE HYDROCHLORIDE 20 MG: 10 INJECTION INTRAMUSCULAR at 10:11

## 2018-11-02 RX ADMIN — SODIUM CHLORIDE 1000 ML: 0.9 INJECTION, SOLUTION INTRAVENOUS at 10:11

## 2018-11-02 RX ADMIN — KETOROLAC TROMETHAMINE 15 MG: 30 INJECTION, SOLUTION INTRAMUSCULAR at 10:11

## 2018-11-02 NOTE — ED PROVIDER NOTES
Encounter Date: 11/2/2018    SCRIBE #1 NOTE: Karis DAWSON am scribing for, and in the presence of, Dr. Zhong.       History     Chief Complaint   Patient presents with    Vomiting     Pt reports vomiting, diarrhea and abd pain since last night.     Diarrhea    Abdominal Pain     Time seen by provider: 9:52 AM    This is a 63 y.o. female with hx of HTN, colon torsion, and gastroparesis who presents with complaint of N/V/D since yesterday. Pt reports associated cramping abdominal pain. She reports taking phenergan pills and suppository without relief. She last ate a [employer] sandwich prior to onset of sx. She has had hx of similar sx with colon torsion. She denies any recent international travel. She also denies any fever, chills, blood in stool, blood in vomitus, chest pain, SOB, urinary sx, lightheadedness, weakness, and back pain.      The history is provided by the patient.     Review of patient's allergies indicates:   Allergen Reactions    Bee sting [allergen ext-venom-honey bee] Swelling    Iodine and iodide containing products Palpitations     Past Medical History:   Diagnosis Date    Colon torsion     Gastroparesis     GERD (gastroesophageal reflux disease)     Hyperlipidemia     Hypertension      Past Surgical History:   Procedure Laterality Date    ABDOMINAL SURGERY      APPENDECTOMY      COLONOSCOPY N/A 8/13/2014    Performed by Robb Kwong Jr., MD at [facility]    HYSTERECTOMY      OOPHORECTOMY       Family History   Problem Relation Age of Onset    Breast cancer Sister 58    Cancer Maternal Grandmother         stomach    Ovarian cancer Maternal Grandmother      Social History     Tobacco Use    Smoking status: Never Smoker    Smokeless tobacco: Never Used   Substance Use Topics    Alcohol use: No    Drug use: No     Review of Systems   Constitutional: Negative for chills, fatigue and fever.   HENT: Negative for congestion, rhinorrhea and sore throat.    Respiratory:  Negative for cough and shortness of breath.    Cardiovascular: Negative for chest pain.   Gastrointestinal: Positive for abdominal pain, diarrhea, nausea and vomiting. Negative for blood in stool.        Negative for blood in vomitus.   Endocrine: Negative for polyuria.   Genitourinary: Negative for decreased urine volume and dysuria.   Musculoskeletal: Negative for back pain.   Skin: Negative for rash.   Allergic/Immunologic: Negative for immunocompromised state.   Neurological: Negative for dizziness, weakness and light-headedness.   Hematological: Does not bruise/bleed easily.   Psychiatric/Behavioral: Negative for confusion.       Physical Exam     Initial Vitals [11/02/18 0930]   BP Pulse Resp Temp SpO2   (!) 179/84 72 18 97.9 °F (36.6 °C) 99 %      MAP       --         Physical Exam    Nursing note and vitals reviewed.  Constitutional: She appears well-developed and well-nourished. She is cooperative.  Non-toxic appearance. She appears distressed.   Appears uncomfortable and nauseated.   HENT:   Head: Normocephalic and atraumatic.   Dry mucous membranes.   Eyes: Conjunctivae and EOM are normal. Pupils are equal, round, and reactive to light.   Neck: Normal range of motion and full passive range of motion without pain. Neck supple. No thyromegaly present. No JVD present.   Cardiovascular: Normal rate, regular rhythm, normal heart sounds and normal pulses.   Pulmonary/Chest: Effort normal and breath sounds normal. No respiratory distress.   Abdominal: Soft. Normal appearance and bowel sounds are normal. She exhibits no distension and no mass. There is no tenderness.   Musculoskeletal: Normal range of motion.   Neurological: She is alert and oriented to person, place, and time. She has normal strength. No cranial nerve deficit or sensory deficit.   Skin: Skin is warm, dry and intact. No rash noted.   Psychiatric: She has a normal mood and affect. Her speech is normal and behavior is normal. Judgment and thought  content normal.         ED Course   Procedures  Labs Reviewed   CBC W/ AUTO DIFFERENTIAL - Abnormal; Notable for the following components:       Result Value    WBC 13.77 (*)     MCH 31.2 (*)     Gran # (ANC) 11.5 (*)     Gran% 83.7 (*)     Lymph% 13.1 (*)     Mono% 2.4 (*)     All other components within normal limits   COMPREHENSIVE METABOLIC PANEL   LIPASE   URINALYSIS, REFLEX TO URINE CULTURE          Imaging Results    None          Medical Decision Making:   Initial Assessment:   Urgent evaluation a 63-year-old female with mitral valve prolapse, and hypertension here with complaint of nausea, vomiting, and non bloody diarrhea.  Pt has hx of gerd, prior diagnosis of gastroparesis, hx of SBO/appy/hysterectomy.  Patient denies sick contacts or recent travel, had questionable subway sandwich yesterday prior to onset of symptoms.  Patient reports similar episodes of discomfort in the past.  Per epic review, patient has had multiple episodes of chronic abdominal pain and gastritis previously, improved after lysis of adhesions in past. Here pt appears uncomfortable/neauseated, no abdominal tenderness/distention, mild dry mm. Will obtain labs, admin antiemetics, IVF and reasses.    Clinical Tests:   Lab Tests: Ordered and Reviewed  ED Management:  Mild leukocytosis non specific in setting of vomiting.   Pt feeling much improved after treatment, nausea improved.   Dc home with antiemetics and fu PCP, strict return precautions given.             Scribe Attestation:   Scribe #1: I performed the above scribed service and the documentation accurately describes the services I performed. I attest to the accuracy of the note.    Attending Attestation:           Physician Attestation for Scribe:  Physician Attestation Statement for Scribe #1: I, Dr. Zhong, reviewed documentation, as scribed by Karis Clemons in my presence, and it is both accurate and complete.                    Clinical Impression:     1.  Non-intractable vomiting with nausea, unspecified vomiting type          Disposition:   Disposition: Discharged  Condition: Maura Zhong MD  11/02/18 0131

## 2018-11-20 ENCOUNTER — OFFICE VISIT (OUTPATIENT)
Dept: OBSTETRICS AND GYNECOLOGY | Facility: CLINIC | Age: 63
End: 2018-11-20
Payer: COMMERCIAL

## 2018-11-20 DIAGNOSIS — Z12.31 VISIT FOR SCREENING MAMMOGRAM: Primary | ICD-10-CM

## 2018-11-20 PROCEDURE — 99499 UNLISTED E&M SERVICE: CPT | Mod: S$GLB,,, | Performed by: SPECIALIST

## 2018-11-20 PROCEDURE — 99999 PR PBB SHADOW E&M-EST. PATIENT-LVL I: CPT | Mod: PBBFAC,,, | Performed by: SPECIALIST

## 2018-11-21 RX ORDER — LISINOPRIL AND HYDROCHLOROTHIAZIDE 12.5; 2 MG/1; MG/1
TABLET ORAL
Qty: 90 TABLET | Refills: 0 | Status: SHIPPED | OUTPATIENT
Start: 2018-11-21 | End: 2019-03-12 | Stop reason: SDUPTHER

## 2018-11-28 ENCOUNTER — HOSPITAL ENCOUNTER (OUTPATIENT)
Dept: RADIOLOGY | Facility: HOSPITAL | Age: 63
Discharge: HOME | End: 2018-11-28
Attending: SPECIALIST
Payer: COMMERCIAL

## 2018-11-28 ENCOUNTER — OFFICE VISIT (OUTPATIENT)
Dept: OBSTETRICS AND GYNECOLOGY | Facility: CLINIC | Age: 63
End: 2018-11-28
Payer: COMMERCIAL

## 2018-11-28 VITALS
BODY MASS INDEX: 24.13 KG/M2 | HEIGHT: 64 IN | SYSTOLIC BLOOD PRESSURE: 146 MMHG | DIASTOLIC BLOOD PRESSURE: 84 MMHG | WEIGHT: 141.31 LBS

## 2018-11-28 VITALS — WEIGHT: 141 LBS | BODY MASS INDEX: 24.07 KG/M2 | HEIGHT: 64 IN

## 2018-11-28 DIAGNOSIS — Z12.31 VISIT FOR SCREENING MAMMOGRAM: Primary | ICD-10-CM

## 2018-11-28 DIAGNOSIS — Z12.31 VISIT FOR SCREENING MAMMOGRAM: ICD-10-CM

## 2018-11-28 DIAGNOSIS — Z01.419 GYNECOLOGIC EXAM NORMAL: ICD-10-CM

## 2018-11-28 PROCEDURE — 99999 PR PBB SHADOW E&M-EST. PATIENT-LVL IV: CPT | Mod: PBBFAC,,, | Performed by: SPECIALIST

## 2018-11-28 PROCEDURE — 87624 HPV HI-RISK TYP POOLED RSLT: CPT

## 2018-11-28 PROCEDURE — 88175 CYTOPATH C/V AUTO FLUID REDO: CPT

## 2018-11-28 PROCEDURE — 3008F BODY MASS INDEX DOCD: CPT | Mod: CPTII,S$GLB,, | Performed by: SPECIALIST

## 2018-11-28 PROCEDURE — 77063 BREAST TOMOSYNTHESIS BI: CPT | Mod: 26,,, | Performed by: RADIOLOGY

## 2018-11-28 PROCEDURE — 77067 SCR MAMMO BI INCL CAD: CPT | Mod: 26,,, | Performed by: RADIOLOGY

## 2018-11-28 PROCEDURE — 99396 PREV VISIT EST AGE 40-64: CPT | Mod: S$GLB,,, | Performed by: SPECIALIST

## 2018-11-28 PROCEDURE — 77063 BREAST TOMOSYNTHESIS BI: CPT | Mod: TC,PN

## 2018-11-28 PROCEDURE — 3077F SYST BP >= 140 MM HG: CPT | Mod: CPTII,S$GLB,, | Performed by: SPECIALIST

## 2018-11-28 PROCEDURE — 3079F DIAST BP 80-89 MM HG: CPT | Mod: CPTII,S$GLB,, | Performed by: SPECIALIST

## 2018-11-28 NOTE — PROGRESS NOTES
62 yo BF presents for annual gyn evaluation. No overt gyn complaints.  Past Medical History:   Diagnosis Date    Colon torsion     Gastroparesis     GERD (gastroesophageal reflux disease)     Hyperlipidemia     Hypertension     Sciatica        Past Surgical History:   Procedure Laterality Date    ABDOMINAL SURGERY      APPENDECTOMY      COLONOSCOPY N/A 8/13/2014    Per[facility] at [facility]    HYSTERECTOMY      OOPHORECTOMY         Family History   Problem Relation Age of Onset    Breast cancer Sister 58    Cancer Maternal Grandmother         stomach    Ovarian cancer Maternal Grandmother        Social History     Socioeconomic History    Marital status:      Spouse name: None    Number of children: None    Years of education: None    Highest education level: None   Social Needs    Financial resource strain: None    Food insecurity - worry: None    Food insecurity - inability: None    Transportation needs - medical: None    Transportation needs - non-medical: None   Occupational History    None   Tobacco Use    Smoking status: Never Smoker    Smokeless tobacco: Never Used   Substance and Sexual Activity    Alcohol use: No    Drug use: No    Sexual activity: None   Other Topics Concern    None   Social History Narrative    None       Current Outpatient Medications   Medication Sig Dispense Refill    atorvastatin (LIPITOR) 20 MG tablet Take 1 tablet (20 mg total) by mouth once daily. 90 tablet 3    estrogens, conjugated, (PREMARIN) 0.9 MG Tab Take 1 tablet (0.9 mg total) by mouth once daily. 90 tablet 0    lisinopril-hydrochlorothiazide (PRINZIDE,ZESTORETIC) 20-12.5 mg per tablet TAKE 1 TABLET BY MOUTH EVERY DAY 90 tablet 0    metoprolol succinate (TOPROL-XL) 50 MG 24 hr tablet TAKE 1 TABLET(50 MG) BY MOUTH EVERY DAY 90 tablet 3    levocetirizine (XYZAL) 5 MG tablet TK 1 T PO  QAM PRF SINUS ALLERGY OR DRIP  6    ondansetron (ZOFRAN-ODT) 4 MG TbDL  Take 1 tablet (4 mg total) by mouth every 6 (six) hours as needed (for vomiting or nausea). 21 tablet 0    promethazine (PHENERGAN) 25 MG suppository Place 1 suppository (25 mg total) rectally every 6 (six) hours as needed for Nausea. 10 suppository 0     No current facility-administered medications for this visit.        Review of patient's allergies indicates:   Allergen Reactions    Bee sting [allergen ext-venom-honey bee] Swelling    Iodine and iodide containing products Palpitations       Review of System:   General: no chills, fever, night sweats, weight gain or weight loss  Psychological: no depression or suicidal ideation  Breasts: no new or changing breast lumps, nipple discharge or masses.  Respiratory: no cough, shortness of breath, or wheezing  Cardiovascular: no chest pain or dyspnea on exertion  Gastrointestinal: no abdominal pain, change in bowel habits, or black or bloody stools  Genito-Urinary: no incontinence, urinary frequency/urgency or vulvar/vaginal symptoms, pelvic pain or abnormal vaginal bleeding.  Musculoskeletal: no gait disturbance or muscular weakness    PE:   APPEARANCE: Well nourished, well developed, in no acute distress.  NECK: Neck symmetric without masses or thyromegaly.  NODES: No inguinal lymph node enlargement.  ABDOMEN: Soft. No tenderness or masses. No hepatosplenomegaly. No hernias.  BREASTS: Symmetrical, no skin changes or visible lesions. No palpable masses, nipple discharge or adenopathy bilaterally.  PELVIC: Normal external female genitalia without lesions. Normal hair distribution. Adequate perineal body, normal urethral meatus. Vagina moist and well rugated without lesions or discharge. No significant cystocele or rectocele. Uterus and cervix surgically absent. Bimanual exam revealed no masses, tenderness or abnormality.      COUNSELING:  The patient was counseled today on osteoporosis prevention, calcium supplementation, and regular weight bearing exercise. The  patient was also counseled today on ACS PAP guidelines, with recommendations for screening PAP smear age 21-65 every 3-5 yearsunless their uterus, cervix, and ovaries were removed for benign reasons. Screening with HPV testing ages 30-65 every 5 years as an alternative. The patient was also counseled regarding monthly breast self-examination, routine STD screening for at-risk populations, prophylactic immunizations for transmitted infections such as HPV, Pertussis, or Influenza as appropriate, and yearly mammograms when indicated by ACS guidelines. She was advised to see her primary care physician for all other health maintenance.   FOLLOW-UP with me for next routine visit.

## 2018-12-03 LAB
HPV HR 12 DNA CVX QL NAA+PROBE: NEGATIVE
HPV16 AG SPEC QL: NEGATIVE
HPV18 DNA SPEC QL NAA+PROBE: NEGATIVE

## 2019-01-16 RX ORDER — ESTROGENS, CONJUGATED 0.9 MG/1
TABLET, FILM COATED ORAL
Qty: 90 TABLET | Refills: 0 | Status: SHIPPED | OUTPATIENT
Start: 2019-01-16 | End: 2019-12-31 | Stop reason: SDUPTHER

## 2019-03-13 RX ORDER — LISINOPRIL AND HYDROCHLOROTHIAZIDE 12.5; 2 MG/1; MG/1
TABLET ORAL
Qty: 90 TABLET | Refills: 0 | Status: SHIPPED | OUTPATIENT
Start: 2019-03-13 | End: 2019-05-26 | Stop reason: SDUPTHER

## 2019-05-27 RX ORDER — LISINOPRIL AND HYDROCHLOROTHIAZIDE 12.5; 2 MG/1; MG/1
TABLET ORAL
Qty: 90 TABLET | Refills: 0 | Status: SHIPPED | OUTPATIENT
Start: 2019-05-27 | End: 2020-02-07

## 2019-11-04 ENCOUNTER — TELEPHONE (OUTPATIENT)
Dept: OBSTETRICS AND GYNECOLOGY | Facility: CLINIC | Age: 64
End: 2019-11-04

## 2019-11-04 ENCOUNTER — TELEPHONE (OUTPATIENT)
Dept: CARDIOLOGY | Facility: CLINIC | Age: 64
End: 2019-11-04

## 2019-11-04 NOTE — TELEPHONE ENCOUNTER
----- Message from Meka Malissa sent at 11/4/2019 12:55 PM CST -----  Contact: pt  Type: Needs Medical Advice    Who Called:  pt  Best Call Back Number:     Additional Information: Requesting a call back from Nurse regarding pt is going out of town and wants to be cleared but she was last seen on 11/28/18,please call with advice and answer questions

## 2019-11-04 NOTE — TELEPHONE ENCOUNTER
----- Message from Meka Leonardo sent at 11/4/2019  1:02 PM CST -----  Contact: pt  Type: Needs Medical Advice    Who Called:  pt  Best Call Back Number:     Additional Information: Requesting a call back from Nurse regarding access to a follow up appt pt has not been seen since 05/22/18,please call back with advice

## 2019-11-05 ENCOUNTER — OFFICE VISIT (OUTPATIENT)
Dept: OBSTETRICS AND GYNECOLOGY | Facility: CLINIC | Age: 64
End: 2019-11-05
Payer: COMMERCIAL

## 2019-11-05 DIAGNOSIS — Z01.419 GYNECOLOGIC EXAM NORMAL: Primary | ICD-10-CM

## 2019-11-05 PROCEDURE — 99499 UNLISTED E&M SERVICE: CPT | Mod: S$GLB,,, | Performed by: SPECIALIST

## 2019-11-05 PROCEDURE — 99999 PR PBB SHADOW E&M-EST. PATIENT-LVL I: ICD-10-PCS | Mod: PBBFAC,,, | Performed by: SPECIALIST

## 2019-11-05 PROCEDURE — 99999 PR PBB SHADOW E&M-EST. PATIENT-LVL I: CPT | Mod: PBBFAC,,, | Performed by: SPECIALIST

## 2019-11-05 PROCEDURE — 99499 NO LOS: ICD-10-PCS | Mod: S$GLB,,, | Performed by: SPECIALIST

## 2019-11-20 ENCOUNTER — OFFICE VISIT (OUTPATIENT)
Dept: CARDIOLOGY | Facility: CLINIC | Age: 64
End: 2019-11-20
Payer: COMMERCIAL

## 2019-11-20 ENCOUNTER — PATIENT MESSAGE (OUTPATIENT)
Dept: ADMINISTRATIVE | Facility: OTHER | Age: 64
End: 2019-11-20

## 2019-11-20 ENCOUNTER — TELEPHONE (OUTPATIENT)
Dept: CARDIOLOGY | Facility: CLINIC | Age: 64
End: 2019-11-20

## 2019-11-20 VITALS
SYSTOLIC BLOOD PRESSURE: 143 MMHG | DIASTOLIC BLOOD PRESSURE: 66 MMHG | BODY MASS INDEX: 24.32 KG/M2 | HEIGHT: 64 IN | WEIGHT: 142.44 LBS | HEART RATE: 67 BPM

## 2019-11-20 DIAGNOSIS — E78.2 MIXED HYPERLIPIDEMIA: ICD-10-CM

## 2019-11-20 DIAGNOSIS — I10 ESSENTIAL HYPERTENSION: Primary | ICD-10-CM

## 2019-11-20 PROCEDURE — 99213 OFFICE O/P EST LOW 20 MIN: CPT | Mod: S$GLB,,, | Performed by: INTERNAL MEDICINE

## 2019-11-20 PROCEDURE — 99999 PR PBB SHADOW E&M-EST. PATIENT-LVL III: CPT | Mod: PBBFAC,,, | Performed by: INTERNAL MEDICINE

## 2019-11-20 PROCEDURE — 3078F DIAST BP <80 MM HG: CPT | Mod: CPTII,S$GLB,, | Performed by: INTERNAL MEDICINE

## 2019-11-20 PROCEDURE — 3078F PR MOST RECENT DIASTOLIC BLOOD PRESSURE < 80 MM HG: ICD-10-PCS | Mod: CPTII,S$GLB,, | Performed by: INTERNAL MEDICINE

## 2019-11-20 PROCEDURE — 3077F PR MOST RECENT SYSTOLIC BLOOD PRESSURE >= 140 MM HG: ICD-10-PCS | Mod: CPTII,S$GLB,, | Performed by: INTERNAL MEDICINE

## 2019-11-20 PROCEDURE — 3077F SYST BP >= 140 MM HG: CPT | Mod: CPTII,S$GLB,, | Performed by: INTERNAL MEDICINE

## 2019-11-20 PROCEDURE — 3008F BODY MASS INDEX DOCD: CPT | Mod: CPTII,S$GLB,, | Performed by: INTERNAL MEDICINE

## 2019-11-20 PROCEDURE — 99999 PR PBB SHADOW E&M-EST. PATIENT-LVL III: ICD-10-PCS | Mod: PBBFAC,,, | Performed by: INTERNAL MEDICINE

## 2019-11-20 PROCEDURE — 3008F PR BODY MASS INDEX (BMI) DOCUMENTED: ICD-10-PCS | Mod: CPTII,S$GLB,, | Performed by: INTERNAL MEDICINE

## 2019-11-20 PROCEDURE — 99213 PR OFFICE/OUTPT VISIT, EST, LEVL III, 20-29 MIN: ICD-10-PCS | Mod: S$GLB,,, | Performed by: INTERNAL MEDICINE

## 2019-11-20 NOTE — PROGRESS NOTES
Subjective:    Patient ID:  Marga Pak is a 64 y.o. female who presents for follow-up of hypertension    HPI  She comes with no complaints, no chest pain, no shortness of breath  BP good at home    Review of Systems   Constitution: Negative for decreased appetite, malaise/fatigue, weight gain and weight loss.   Cardiovascular: Negative for chest pain, dyspnea on exertion, leg swelling, palpitations and syncope.   Respiratory: Negative for cough and shortness of breath.    Gastrointestinal: Negative.    Neurological: Negative for weakness.   All other systems reviewed and are negative.       Objective:      Physical Exam   Constitutional: She is oriented to person, place, and time. She appears well-developed and well-nourished.   HENT:   Head: Normocephalic.   Eyes: Pupils are equal, round, and reactive to light.   Neck: Normal range of motion. Neck supple. No JVD present. Carotid bruit is not present. No thyromegaly present.   Cardiovascular: Normal rate, regular rhythm, normal heart sounds, intact distal pulses and normal pulses. PMI is not displaced. Exam reveals no gallop.   No murmur heard.  Pulmonary/Chest: Effort normal and breath sounds normal.   Abdominal: Soft. Normal appearance. She exhibits no mass. There is no hepatosplenomegaly. There is no tenderness.   Musculoskeletal: Normal range of motion. She exhibits no edema.   Neurological: She is alert and oriented to person, place, and time. She has normal strength and normal reflexes. No sensory deficit.   Skin: Skin is warm and intact.   Psychiatric: She has a normal mood and affect.   Nursing note and vitals reviewed.        Assessment:       1. Essential hypertension    2. Mixed hyperlipidemia         Plan:   Refer to digital hypertension program  Continue all cardiac medications  Regular exercise program  Weight loss

## 2019-11-20 NOTE — TELEPHONE ENCOUNTER
----- Message from Yashira Carballo sent at 11/20/2019  8:38 AM CST -----  Contact: pt  Pt calling at 8:23 am states that she is 2 miles from the [facility], [place] [address] Traffic is going at a slow pace. IM the MA no response as to if her being late is okay....[phone] (home)

## 2019-12-17 ENCOUNTER — TELEPHONE (OUTPATIENT)
Dept: OBSTETRICS AND GYNECOLOGY | Facility: CLINIC | Age: 64
End: 2019-12-17

## 2019-12-17 RX ORDER — FAMOTIDINE 20 MG/1
20 TABLET, FILM COATED ORAL 2 TIMES DAILY
Qty: 60 TABLET | Refills: 1 | Status: SHIPPED | OUTPATIENT
Start: 2019-12-17 | End: 2023-05-16

## 2019-12-17 NOTE — TELEPHONE ENCOUNTER
----- Message from Sheron Julian sent at 12/17/2019  1:44 PM CST -----  Contact: self   Patient need a refill on famotidine 20mg twice a day pharmacy is out of 40mg please send to  [facility] pharmacy, any questions please call back at [phone] (home)       Case number [id number]  [facility] pharmacy  [address] [place] [address]  Phone number [phone]

## 2019-12-27 DIAGNOSIS — Z12.31 VISIT FOR SCREENING MAMMOGRAM: Primary | ICD-10-CM

## 2019-12-31 ENCOUNTER — HOSPITAL ENCOUNTER (OUTPATIENT)
Dept: RADIOLOGY | Facility: HOSPITAL | Age: 64
Discharge: HOME | End: 2019-12-31
Attending: SPECIALIST
Payer: COMMERCIAL

## 2019-12-31 ENCOUNTER — OFFICE VISIT (OUTPATIENT)
Dept: OBSTETRICS AND GYNECOLOGY | Facility: CLINIC | Age: 64
End: 2019-12-31
Payer: COMMERCIAL

## 2019-12-31 VITALS — HEIGHT: 64 IN | BODY MASS INDEX: 24.32 KG/M2 | WEIGHT: 142.44 LBS

## 2019-12-31 VITALS
BODY MASS INDEX: 24.82 KG/M2 | WEIGHT: 144.63 LBS | DIASTOLIC BLOOD PRESSURE: 84 MMHG | SYSTOLIC BLOOD PRESSURE: 122 MMHG

## 2019-12-31 DIAGNOSIS — Z12.31 VISIT FOR SCREENING MAMMOGRAM: ICD-10-CM

## 2019-12-31 DIAGNOSIS — Z01.419 ROUTINE GYNECOLOGICAL EXAMINATION: Primary | ICD-10-CM

## 2019-12-31 DIAGNOSIS — Z01.419 GYNECOLOGIC EXAM NORMAL: ICD-10-CM

## 2019-12-31 DIAGNOSIS — Z79.890 HORMONE REPLACEMENT THERAPY (HRT): ICD-10-CM

## 2019-12-31 PROCEDURE — 3074F PR MOST RECENT SYSTOLIC BLOOD PRESSURE < 130 MM HG: ICD-10-PCS | Mod: CPTII,S$GLB,, | Performed by: SPECIALIST

## 2019-12-31 PROCEDURE — 77067 SCR MAMMO BI INCL CAD: CPT | Mod: TC,PN

## 2019-12-31 PROCEDURE — 3079F DIAST BP 80-89 MM HG: CPT | Mod: CPTII,S$GLB,, | Performed by: SPECIALIST

## 2019-12-31 PROCEDURE — 3079F PR MOST RECENT DIASTOLIC BLOOD PRESSURE 80-89 MM HG: ICD-10-PCS | Mod: CPTII,S$GLB,, | Performed by: SPECIALIST

## 2019-12-31 PROCEDURE — 77067 SCR MAMMO BI INCL CAD: CPT | Mod: 26,,, | Performed by: RADIOLOGY

## 2019-12-31 PROCEDURE — 77063 BREAST TOMOSYNTHESIS BI: CPT | Mod: 26,,, | Performed by: RADIOLOGY

## 2019-12-31 PROCEDURE — 3008F PR BODY MASS INDEX (BMI) DOCUMENTED: ICD-10-PCS | Mod: CPTII,S$GLB,, | Performed by: SPECIALIST

## 2019-12-31 PROCEDURE — 77067 MAMMO DIGITAL SCREENING BILAT WITH TOMOSYNTHESIS_CAD: ICD-10-PCS | Mod: 26,,, | Performed by: RADIOLOGY

## 2019-12-31 PROCEDURE — 99396 PR PREVENTIVE VISIT,EST,40-64: ICD-10-PCS | Mod: S$GLB,,, | Performed by: SPECIALIST

## 2019-12-31 PROCEDURE — 99999 PR PBB SHADOW E&M-EST. PATIENT-LVL III: ICD-10-PCS | Mod: PBBFAC,,, | Performed by: SPECIALIST

## 2019-12-31 PROCEDURE — 77063 MAMMO DIGITAL SCREENING BILAT WITH TOMOSYNTHESIS_CAD: ICD-10-PCS | Mod: 26,,, | Performed by: RADIOLOGY

## 2019-12-31 PROCEDURE — 3074F SYST BP LT 130 MM HG: CPT | Mod: CPTII,S$GLB,, | Performed by: SPECIALIST

## 2019-12-31 PROCEDURE — 3008F BODY MASS INDEX DOCD: CPT | Mod: CPTII,S$GLB,, | Performed by: SPECIALIST

## 2019-12-31 PROCEDURE — 99396 PREV VISIT EST AGE 40-64: CPT | Mod: S$GLB,,, | Performed by: SPECIALIST

## 2019-12-31 PROCEDURE — 99999 PR PBB SHADOW E&M-EST. PATIENT-LVL III: CPT | Mod: PBBFAC,,, | Performed by: SPECIALIST

## 2019-12-31 RX ORDER — ATORVASTATIN CALCIUM 20 MG/1
20 TABLET, FILM COATED ORAL DAILY
Qty: 90 TABLET | Refills: 3 | Status: SHIPPED | OUTPATIENT
Start: 2019-12-31 | End: 2021-11-08

## 2019-12-31 NOTE — TELEPHONE ENCOUNTER
----- Message from Sheila Velasquez sent at 12/31/2019 11:50 AM CST -----  Contact: Patient  Type:  RX Refill Request    Who Called:  Patient  Refill or New Rx:  Refill  RX Name and Strength:  atorvastatin (LIPITOR) 20 MG tablet  How is the patient currently taking it? (ex. 1XDay):   Take 1 tablet (20 mg total) by mouth once daily. - Oral  Is this a 30 day or 90 day RX:  90 tablet  Preferred Pharmacy with phone number:    [facility]  [address] [place] [address] [place]  Phone: [phone]  Local or Mail Order:  local  Ordering Provider:  Bill Pedro MD  Best Call Back Number:

## 2019-12-31 NOTE — PROGRESS NOTES
65 yo BF presents for annual gyn evaluation as well as mammo screening and continued ERT management. No overt gyn complaints.  Past Medical History:   Diagnosis Date    Colon torsion     Gastroparesis     GERD (gastroesophageal reflux disease)     Hyperlipidemia     Hypertension     Sciatica        Past Surgical History:   Procedure Laterality Date    ABDOMINAL SURGERY      APPENDECTOMY      HYSTERECTOMY      OOPHORECTOMY         Family History   Problem Relation Age of Onset    Breast cancer Sister 58    Cancer Maternal Grandmother         stomach    Ovarian cancer Maternal Grandmother        Social History     Socioeconomic History    Marital status:      Spouse name: Not on file    Number of children: Not on file    Years of education: Not on file    Highest education level: Not on file   Occupational History    Not on file   Social Needs    Financial resource strain: Not on file    Food insecurity:     Worry: Not on file     Inability: Not on file    Transportation needs:     Medical: Not on file     Non-medical: Not on file   Tobacco Use    Smoking status: Never Smoker    Smokeless tobacco: Never Used   Substance and Sexual Activity    Alcohol use: No    Drug use: No    Sexual activity: Not on file   Lifestyle    Physical activity:     Days per week: Not on file     Minutes per session: Not on file    Stress: Not on file   Relationships    Social connections:     Talks on phone: Not on file     Gets together: Not on file     Attends [employer] service: Not on file     Active member of club or organization: Not on file     Attends meetings of clubs or organizations: Not on file     Relationship status: Not on file   Other Topics Concern    Not on file   Social History Narrative    Not on file       Current Outpatient Medications   Medication Sig Dispense Refill    atorvastatin (LIPITOR) 20 MG tablet Take 1 tablet (20 mg total) by mouth once daily. 90 tablet 3    famotidine  (PEPCID) 20 MG tablet Take 1 tablet (20 mg total) by mouth 2 (two) times daily. 60 tablet 1    levocetirizine (XYZAL) 5 MG tablet TK 1 T PO  QAM PRF SINUS ALLERGY OR DRIP  6    lisinopril-hydrochlorothiazide (PRINZIDE,ZESTORETIC) 20-12.5 mg per tablet TAKE 1 TABLET BY MOUTH EVERY DAY 90 tablet 0    metoprolol succinate (TOPROL-XL) 50 MG 24 hr tablet TAKE 1 TABLET(50 MG) BY MOUTH EVERY DAY 90 tablet 3    ondansetron (ZOFRAN-ODT) 4 MG TbDL Take 1 tablet (4 mg total) by mouth every 6 (six) hours as needed (for vomiting or nausea). 21 tablet 0    PREMARIN 0.9 mg Tab TAKE 1 TABLET(0.9 MG) BY MOUTH EVERY DAY 90 tablet 0    promethazine (PHENERGAN) 25 MG suppository Place 1 suppository (25 mg total) rectally every 6 (six) hours as needed for Nausea. 10 suppository 0     No current facility-administered medications for this visit.        Review of patient's allergies indicates:   Allergen Reactions    Bee sting [allergen ext-venom-honey bee] Swelling    Iodine and iodide containing products Palpitations       Review of System:   General: no chills, fever, night sweats, weight gain or weight loss  Psychological: no depression or suicidal ideation  Breasts: no new or changing breast lumps, nipple discharge or masses.  Respiratory: no cough, shortness of breath, or wheezing  Cardiovascular: no chest pain or dyspnea on exertion  Gastrointestinal: no abdominal pain, change in bowel habits, or black or bloody stools  Genito-Urinary: no incontinence, urinary frequency/urgency or vulvar/vaginal symptoms, pelvic pain or abnormal vaginal bleeding.  Musculoskeletal: no gait disturbance or muscular weakness    PE:   APPEARANCE: Well nourished, well developed, in no acute distress.  NECK: Neck symmetric without masses or thyromegaly.  NODES: No inguinal lymph node enlargement.  ABDOMEN: Soft. No tenderness or masses. No hepatosplenomegaly. No hernias.  BREASTS: Symmetrical, no skin changes or visible lesions. No palpable masses,  nipple discharge or adenopathy bilaterally.  PELVIC: Normal external female genitalia without lesions. Normal hair distribution. Adequate perineal body, normal urethral meatus. Vagina moist and well rugated without lesions or discharge. No significant cystocele or rectocele. Uterus and cervix surgically absent. Bimanual exam revealed no masses, tenderness or abnormality.      Plan BSE monthly        Mammogram today        Refill Premarin  RTO 1 year/prn

## 2020-01-17 ENCOUNTER — PATIENT OUTREACH (OUTPATIENT)
Dept: OTHER | Facility: OTHER | Age: 65
End: 2020-01-17

## 2020-01-17 NOTE — LETTER
February 13, 2020     Marga Pak  [address] [place] [address]       Dear Marga,    Welcome to [facility]! Our goal is to make care effective, proactive and convenient by using data you send us from home to better treat your chronic conditions.              My name is Eli Milligan, and I am your dedicated Digital Medicine clinician. As an expert in medication management, I will help ensure that the medications you are taking continue to provide the intended benefits and help you reach your goals. You can reach me directly at [phone] or by sending me a message directly through your [facility] account.      I am Tamara Alexander and I will be your health . My job is to help you identify lifestyle changes to improve your disease control. We will talk about nutrition, exercise, and other ways you may be able to adjust your current habits to better your health. Additionally, we will help ensure you are completing the tests and screenings that are necessary to help manage your conditions. You can reach me directly at  or by sending me a message directly through your [facility] account.    Most importantly, YOU are at the center of this team. Together, we will work to improve your overall health and encourage you to meet your goals for a healthier lifestyle.     What we expect from YOU:  · Please take frequent home blood pressure measurements. We ask that you take at least 1 blood pressure reading per week, but more information will better help us get you know you. Be sure you rest for a few minutes before taking the reading in a quiet, comfortable place.     Be available to receive phone calls or [employer] messages, when appropriate, from your care team. Please let us know if there are any specific days or times that work best for us to reach you via phone.     Complete routine tests and screenings. Dont worry, we will help keep you on track!           What you should expect from  your Digital Medicine Care Team:   We will work with you to create a personalized plan of care and provide you with encouragement and education, including regarding lifestyle changes, that could help you manage your disease states.     We will adjust your current medications, if needed, and continue to monitor your long-term progress.     We will provide you and your physician with monthly progress reports after you have been in the program for more than 30 days.     We will send you reminders through [employer] and text messages to help ensure you do not miss any testing deadlines to help manage your disease states.    You will be able to reach us by phone or through your [facility] account by clicking our names under Care Team on the right side of the home screen.    I look forward to working with you to achieve your blood pressure goals!    We look forward to working with you to help manage your health,    Sincerely,    Your Digital Medicine Team    Please visit our websites to learn more:   · Hypertension: [email or web address]      Remember, we are not available for emergencies. If you have an emergency, please contact your doctors office directly or call [facility] ([phone] or [phone]) or 911.

## 2020-01-17 NOTE — LETTER
February 13, 2020     Marga Pak  [address] [place] [address]       Dear Marga,    Welcome to [facility]! Our goal is to make care effective, proactive and convenient by using data you send us from home to better treat your chronic conditions.              My name is Eli Milligan, and I am your dedicated Digital Medicine clinician. As an expert in medication management, I will help ensure that the medications you are taking continue to provide the intended benefits and help you reach your goals. You can reach me directly at [phone] or by sending me a message directly through your [facility] account.      I am Tamara Alexander and I will be your health . My job is to help you identify lifestyle changes to improve your disease control. We will talk about nutrition, exercise, and other ways you may be able to adjust your current habits to better your health. Additionally, we will help ensure you are completing the tests and screenings that are necessary to help manage your conditions. You can reach me directly at  or by sending me a message directly through your [facility] account.    Most importantly, YOU are at the center of this team. Together, we will work to improve your overall health and encourage you to meet your goals for a healthier lifestyle.     What we expect from YOU:  · Please take frequent home blood pressure measurements. We ask that you take at least 1 blood pressure reading per week, but more information will better help us get you know you. Be sure you rest for a few minutes before taking the reading in a quiet, comfortable place.     Be available to receive phone calls or [employer] messages, when appropriate, from your care team. Please let us know if there are any specific days or times that work best for us to reach you via phone.     Complete routine tests and screenings. Dont worry, we will help keep you on track!           What you should expect from  your Digital Medicine Care Team:   We will work with you to create a personalized plan of care and provide you with encouragement and education, including regarding lifestyle changes, that could help you manage your disease states.     We will adjust your current medications, if needed, and continue to monitor your long-term progress.     We will provide you and your physician with monthly progress reports after you have been in the program for more than 30 days.     We will send you reminders through [employer] and text messages to help ensure you do not miss any testing deadlines to help manage your disease states.    You will be able to reach us by phone or through your [employer] account by clicking our names under Care Team on the right side of the home screen.    I look forward to working with you to achieve your blood pressure goals!    We look forward to working with you to help manage your health,    Sincerely,    Your Digital Medicine Team    Please visit our websites to learn more:   · Hypertension: [email or web address]      Remember, we are not available for emergencies. If you have an emergency, please contact your doctors office directly or call [facility] ([phone] or [phone]) or 911.

## 2020-01-22 NOTE — PROGRESS NOTES
2nd attempt to complete the enrollment call. No answer, voicemail not setup. Sent portal message.

## 2020-02-05 NOTE — PROGRESS NOTES
Digital Medicine Program Enrollment  HPI   4th attempt for enrollment call.  No answer, left voicemail.  Also sent enrolling provider message.

## 2020-02-06 ENCOUNTER — NURSE TRIAGE (OUTPATIENT)
Dept: ADMINISTRATIVE | Facility: CLINIC | Age: 65
End: 2020-02-06

## 2020-02-06 NOTE — TELEPHONE ENCOUNTER
Reason for Disposition   Second attempt to contact family AND no contact made. Phone number verified.    Protocols used: NO CONTACT OR DUPLICATE CONTACT CALL-A-OH    No answer times 2 attempts and unable to leave a message

## 2020-02-07 ENCOUNTER — OFFICE VISIT (OUTPATIENT)
Dept: CARDIOLOGY | Facility: CLINIC | Age: 65
End: 2020-02-07
Payer: MEDICARE

## 2020-02-07 VITALS
HEIGHT: 64 IN | BODY MASS INDEX: 25.03 KG/M2 | HEART RATE: 63 BPM | SYSTOLIC BLOOD PRESSURE: 152 MMHG | DIASTOLIC BLOOD PRESSURE: 76 MMHG | WEIGHT: 146.63 LBS

## 2020-02-07 DIAGNOSIS — I10 ESSENTIAL HYPERTENSION: ICD-10-CM

## 2020-02-07 DIAGNOSIS — E78.2 MIXED HYPERLIPIDEMIA: ICD-10-CM

## 2020-02-07 PROCEDURE — 3008F BODY MASS INDEX DOCD: CPT | Mod: CPTII,S$GLB,, | Performed by: PHYSICIAN ASSISTANT

## 2020-02-07 PROCEDURE — 3077F PR MOST RECENT SYSTOLIC BLOOD PRESSURE >= 140 MM HG: ICD-10-PCS | Mod: CPTII,S$GLB,, | Performed by: PHYSICIAN ASSISTANT

## 2020-02-07 PROCEDURE — 99999 PR PBB SHADOW E&M-EST. PATIENT-LVL III: CPT | Mod: PBBFAC,,, | Performed by: PHYSICIAN ASSISTANT

## 2020-02-07 PROCEDURE — 1101F PT FALLS ASSESS-DOCD LE1/YR: CPT | Mod: CPTII,S$GLB,, | Performed by: PHYSICIAN ASSISTANT

## 2020-02-07 PROCEDURE — 1101F PR PT FALLS ASSESS DOC 0-1 FALLS W/OUT INJ PAST YR: ICD-10-PCS | Mod: CPTII,S$GLB,, | Performed by: PHYSICIAN ASSISTANT

## 2020-02-07 PROCEDURE — 99214 PR OFFICE/OUTPT VISIT, EST, LEVL IV, 30-39 MIN: ICD-10-PCS | Mod: S$GLB,,, | Performed by: PHYSICIAN ASSISTANT

## 2020-02-07 PROCEDURE — 3077F SYST BP >= 140 MM HG: CPT | Mod: CPTII,S$GLB,, | Performed by: PHYSICIAN ASSISTANT

## 2020-02-07 PROCEDURE — 3078F PR MOST RECENT DIASTOLIC BLOOD PRESSURE < 80 MM HG: ICD-10-PCS | Mod: CPTII,S$GLB,, | Performed by: PHYSICIAN ASSISTANT

## 2020-02-07 PROCEDURE — 99999 PR PBB SHADOW E&M-EST. PATIENT-LVL III: ICD-10-PCS | Mod: PBBFAC,,, | Performed by: PHYSICIAN ASSISTANT

## 2020-02-07 PROCEDURE — 3078F DIAST BP <80 MM HG: CPT | Mod: CPTII,S$GLB,, | Performed by: PHYSICIAN ASSISTANT

## 2020-02-07 PROCEDURE — 3008F PR BODY MASS INDEX (BMI) DOCUMENTED: ICD-10-PCS | Mod: CPTII,S$GLB,, | Performed by: PHYSICIAN ASSISTANT

## 2020-02-07 PROCEDURE — 99214 OFFICE O/P EST MOD 30 MIN: CPT | Mod: S$GLB,,, | Performed by: PHYSICIAN ASSISTANT

## 2020-02-07 RX ORDER — LISINOPRIL AND HYDROCHLOROTHIAZIDE 12.5; 2 MG/1; MG/1
2 TABLET ORAL DAILY
Qty: 180 TABLET | Refills: 3 | Status: SHIPPED | OUTPATIENT
Start: 2020-02-07 | End: 2020-04-01 | Stop reason: SDUPTHER

## 2020-02-07 NOTE — PROGRESS NOTES
"Subjective:    Patient ID:  Marga Pak is a 65 y.o. female who presents for follow-up of HTN.     HPI  Ms. Pak is a very pleasant lady who follows with Dr. Pedro. She presents today for follow up of HTN. She states her BP has been elevated recently. She c/o headaches, though she states they may be secondary to a cold. She took an extra lisinopril-HCT last night and it was better this morning. She states she is currentlty enrolled in Digital HTN, but it appears from the chart that they were having difficulty completing her on-boarding.     Review of Systems   Constitution: Negative for chills, diaphoresis, fever, weight gain and weight loss.   HENT: Negative for sore throat.    Eyes: Negative for blurred vision, vision loss in left eye, vision loss in right eye and visual disturbance.   Cardiovascular: Negative for chest pain, claudication, dyspnea on exertion, leg swelling, near-syncope, orthopnea, palpitations, paroxysmal nocturnal dyspnea and syncope.   Respiratory: Negative for cough, hemoptysis, shortness of breath, sputum production and wheezing.    Endocrine: Negative for cold intolerance and heat intolerance.   Hematologic/Lymphatic: Negative for adenopathy. Does not bruise/bleed easily.   Skin: Negative for rash.   Musculoskeletal: Negative for falls, muscle weakness and myalgias.   Gastrointestinal: Negative for abdominal pain, change in bowel habit, constipation, diarrhea, melena and nausea.   Genitourinary: Negative for bladder incontinence.   Neurological: Negative for dizziness, focal weakness, headaches, light-headedness, numbness and weakness.   Psychiatric/Behavioral: Negative for altered mental status.         Vitals:    02/07/20 1444   BP: (!) 152/76   BP Location: Left arm   Patient Position: Sitting   BP Method: Medium (Automatic)   Pulse: 63   Weight: 66.5 kg (146 lb 9.7 oz)   Height: 5' 4" (1.626 m)     Body mass index is 25.16 kg/m².     Objective:    Physical Exam "   Constitutional: She is oriented to person, place, and time. She appears well-developed and well-nourished. No distress.   HENT:   Head: Normocephalic and atraumatic.   Mouth/Throat: Oropharynx is clear and moist.   Eyes: Pupils are equal, round, and reactive to light. Conjunctivae and EOM are normal. No scleral icterus.   Neck: Neck supple. No JVD present. No tracheal deviation present.   Cardiovascular: Normal rate and regular rhythm. Exam reveals no gallop and no friction rub.   No murmur heard.  Pulmonary/Chest: Effort normal and breath sounds normal. No respiratory distress. She has no wheezes. She has no rales. She exhibits no tenderness.   Abdominal: Soft. Bowel sounds are normal. She exhibits no distension. There is no hepatosplenomegaly. There is no tenderness.   Musculoskeletal: She exhibits no edema or tenderness.   Neurological: She is alert and oriented to person, place, and time.   Skin: Skin is warm and dry. No rash noted. No erythema.   Psychiatric: She has a normal mood and affect. Her behavior is normal.         Assessment:         Essential hypertension  Uncontrolled.   On Toprol XL and Lisinopril-HCTZ.     Hyperlipidemia  Not at goal.   On statin.     Plan:       Increase lisinopril to 40-25mg daily.   Continue Toprol XL 50mg daily.   BMP in 1 week.   F/U with Dr. Pedro in 6 months.

## 2020-02-13 ENCOUNTER — TELEPHONE (OUTPATIENT)
Dept: CARDIOLOGY | Facility: CLINIC | Age: 65
End: 2020-02-13

## 2020-02-13 RX ORDER — AMLODIPINE BESYLATE 2.5 MG/1
2.5 TABLET ORAL DAILY
Qty: 30 TABLET | Refills: 11 | Status: SHIPPED | OUTPATIENT
Start: 2020-02-13 | End: 2020-02-13 | Stop reason: SDUPTHER

## 2020-02-13 RX ORDER — METOPROLOL SUCCINATE 50 MG/1
25 TABLET, EXTENDED RELEASE ORAL DAILY
Qty: 90 TABLET | Refills: 3 | Status: ON HOLD | OUTPATIENT
Start: 2020-02-13 | End: 2020-02-17 | Stop reason: SDUPTHER

## 2020-02-13 RX ORDER — AMLODIPINE BESYLATE 2.5 MG/1
2.5 TABLET ORAL DAILY
Qty: 30 TABLET | Refills: 11 | Status: ON HOLD | OUTPATIENT
Start: 2020-02-13 | End: 2020-02-17 | Stop reason: HOSPADM

## 2020-02-13 NOTE — TELEPHONE ENCOUNTER
Pt. Transfer to Digital HTN program to finish set up and get consult for HTN. Will call back to follow up.

## 2020-02-13 NOTE — TELEPHONE ENCOUNTER
Please advise: pt. Reported slight headache, nausea, weak, and BP has going back up. What to know what to do asked to see BP readings in Digital hypertension program.

## 2020-02-13 NOTE — PROGRESS NOTES
Digital Medicine Program Enrollment      Our goal is to get BP to consistently below 130/80mmHg and make the process convenient so patient can avoid extra trips to the office. Getting your blood pressure below 130/80mmHg (definition of control) will reduce your risk for heart attack, kidney failure, stroke and death (as well as kidney failure, eye disease, & dementia)      Reviewed that the Digital Medicine care team - consisting of a clinician and a health  - will follow the most current evidence-based national guidelines for treating your condition.  The health  will focus on lifestyle modifications and motivation while the clinician will focus on medication therapy.  The care team will review all data on a regular basis and reach out as needed.      Explained that one of the key parts of the program is communication with the care team.  Asked patient to respond to outreach attempts and complete questionnaires.  Stressed importance of medication adherence.      Explained that we expect patient to obtain several blood pressures per week at random times of day.  Instructed patient not to allow anyone else to use phone and monitoring device.  Confirmed appropriate BP monitoring technique.      Explained to patient that the digital medicine team is not available for emergencies.  Patient will call [facility] on-call ([phone] or [phone]) or [phone] if needed.        Pt mentioned having some elevated reading and experiencing chest pains.. Suggested pt go to an ER and also maybe follow up with her cardiologist if she is experiencing symptoms of elevated BP

## 2020-02-13 NOTE — TELEPHONE ENCOUNTER
----- Message from Annamaria Lo sent at 2/13/2020  9:21 AM CST -----  Contact: patient  Type:  Same Day Appointment Request  Caller is requesting a same day appointment.  Caller declined first available appointment listed below.    Name of Caller:  patient  When is the first available appointment? ?  Symptoms:  BP is going up and down  Best Call Back Number:  [phone]  Additional Information:   Patient states she does not feel well BP is doing the same thing last week and when she saw Brandi last.  Please call to advise and schedule.Thanks!

## 2020-02-14 ENCOUNTER — PATIENT OUTREACH (OUTPATIENT)
Dept: OTHER | Facility: OTHER | Age: 65
End: 2020-02-14

## 2020-02-16 ENCOUNTER — HOSPITAL ENCOUNTER (OUTPATIENT)
Facility: HOSPITAL | Age: 65
Discharge: HOME | End: 2020-02-17
Attending: EMERGENCY MEDICINE | Admitting: FAMILY MEDICINE
Payer: MEDICARE

## 2020-02-16 DIAGNOSIS — I49.9 ARRHYTHMIA: ICD-10-CM

## 2020-02-16 DIAGNOSIS — R79.89 ELEVATED TROPONIN: ICD-10-CM

## 2020-02-16 DIAGNOSIS — R00.2 PALPITATIONS: ICD-10-CM

## 2020-02-16 DIAGNOSIS — I24.89 DEMAND ISCHEMIA: Primary | ICD-10-CM

## 2020-02-16 DIAGNOSIS — R79.89 TROPONIN I ABOVE REFERENCE RANGE: ICD-10-CM

## 2020-02-16 PROBLEM — E87.6 HYPOKALEMIA: Status: ACTIVE | Noted: 2020-02-16

## 2020-02-16 LAB
ALBUMIN SERPL BCP-MCNC: 4.1 G/DL (ref 3.5–5.2)
ALP SERPL-CCNC: 87 U/L (ref 55–135)
ALT SERPL W/O P-5'-P-CCNC: 40 U/L (ref 10–44)
ANION GAP SERPL CALC-SCNC: 12 MMOL/L (ref 8–16)
AST SERPL-CCNC: 39 U/L (ref 10–40)
BASOPHILS # BLD AUTO: 0.08 K/UL (ref 0–0.2)
BASOPHILS NFR BLD: 1 % (ref 0–1.9)
BILIRUB SERPL-MCNC: 0.2 MG/DL (ref 0.1–1)
BILIRUB UR QL STRIP: NEGATIVE
BNP SERPL-MCNC: 36 PG/ML (ref 0–99)
BUN SERPL-MCNC: 17 MG/DL (ref 8–23)
CALCIUM SERPL-MCNC: 9.7 MG/DL (ref 8.7–10.5)
CHLORIDE SERPL-SCNC: 102 MMOL/L (ref 95–110)
CLARITY UR: CLEAR
CO2 SERPL-SCNC: 29 MMOL/L (ref 23–29)
COLOR UR: YELLOW
CREAT SERPL-MCNC: 0.8 MG/DL (ref 0.5–1.4)
D DIMER PPP IA.FEU-MCNC: 0.34 MG/L FEU
DIFFERENTIAL METHOD: ABNORMAL
EOSINOPHIL # BLD AUTO: 0.1 K/UL (ref 0–0.5)
EOSINOPHIL NFR BLD: 1 % (ref 0–8)
ERYTHROCYTE [DISTWIDTH] IN BLOOD BY AUTOMATED COUNT: 12.1 % (ref 11.5–14.5)
EST. GFR  (AFRICAN AMERICAN): >60 ML/MIN/1.73 M^2
EST. GFR  (NON AFRICAN AMERICAN): >60 ML/MIN/1.73 M^2
GLUCOSE SERPL-MCNC: 104 MG/DL (ref 70–110)
GLUCOSE UR QL STRIP: NEGATIVE
HCT VFR BLD AUTO: 43.1 % (ref 37–48.5)
HGB BLD-MCNC: 14.1 G/DL (ref 12–16)
HGB UR QL STRIP: NEGATIVE
IMM GRANULOCYTES # BLD AUTO: 0.02 K/UL (ref 0–0.04)
KETONES UR QL STRIP: NEGATIVE
LEUKOCYTE ESTERASE UR QL STRIP: NEGATIVE
LYMPHOCYTES # BLD AUTO: 3.7 K/UL (ref 1–4.8)
LYMPHOCYTES NFR BLD: 46 % (ref 18–48)
MAGNESIUM SERPL-MCNC: 1.8 MG/DL (ref 1.6–2.6)
MCH RBC QN AUTO: 31.2 PG (ref 27–31)
MCHC RBC AUTO-ENTMCNC: 32.7 G/DL (ref 32–36)
MCV RBC AUTO: 95 FL (ref 82–98)
MONOCYTES # BLD AUTO: 0.6 K/UL (ref 0.3–1)
MONOCYTES NFR BLD: 7.7 % (ref 4–15)
NEUTROPHILS # BLD AUTO: 3.5 K/UL (ref 1.8–7.7)
NEUTROPHILS NFR BLD: 44 % (ref 38–73)
NITRITE UR QL STRIP: NEGATIVE
NRBC BLD-RTO: 0 /100 WBC
PH UR STRIP: 7 [PH] (ref 5–8)
PHOSPHATE SERPL-MCNC: 2.6 MG/DL (ref 2.7–4.5)
PLATELET # BLD AUTO: 321 K/UL (ref 150–350)
PMV BLD AUTO: 9.7 FL (ref 9.2–12.9)
POTASSIUM SERPL-SCNC: 3.4 MMOL/L (ref 3.5–5.1)
PROT SERPL-MCNC: 7.5 G/DL (ref 6–8.4)
PROT UR QL STRIP: NEGATIVE
RBC # BLD AUTO: 4.52 M/UL (ref 4–5.4)
SODIUM SERPL-SCNC: 143 MMOL/L (ref 136–145)
SP GR UR STRIP: 1.02 (ref 1–1.03)
TROPONIN I SERPL DL<=0.01 NG/ML-MCNC: 0.02 NG/ML (ref 0–0.03)
TROPONIN I SERPL DL<=0.01 NG/ML-MCNC: 0.03 NG/ML (ref 0–0.03)
TROPONIN I SERPL DL<=0.01 NG/ML-MCNC: 0.03 NG/ML (ref 0–0.03)
TSH SERPL DL<=0.005 MIU/L-ACNC: 1.05 UIU/ML (ref 0.4–4)
URN SPEC COLLECT METH UR: NORMAL
UROBILINOGEN UR STRIP-ACNC: NEGATIVE EU/DL
WBC # BLD AUTO: 7.93 K/UL (ref 3.9–12.7)

## 2020-02-16 PROCEDURE — 80053 COMPREHEN METABOLIC PANEL: CPT

## 2020-02-16 PROCEDURE — 83735 ASSAY OF MAGNESIUM: CPT

## 2020-02-16 PROCEDURE — 84100 ASSAY OF PHOSPHORUS: CPT

## 2020-02-16 PROCEDURE — 83880 ASSAY OF NATRIURETIC PEPTIDE: CPT

## 2020-02-16 PROCEDURE — 85379 FIBRIN DEGRADATION QUANT: CPT

## 2020-02-16 PROCEDURE — 63600175 PHARM REV CODE 636 W HCPCS: Performed by: NURSE PRACTITIONER

## 2020-02-16 PROCEDURE — 63600175 PHARM REV CODE 636 W HCPCS: Performed by: EMERGENCY MEDICINE

## 2020-02-16 PROCEDURE — 85025 COMPLETE CBC W/AUTO DIFF WBC: CPT

## 2020-02-16 PROCEDURE — 81003 URINALYSIS AUTO W/O SCOPE: CPT

## 2020-02-16 PROCEDURE — G0378 HOSPITAL OBSERVATION PER HR: HCPCS

## 2020-02-16 PROCEDURE — 84484 ASSAY OF TROPONIN QUANT: CPT

## 2020-02-16 PROCEDURE — 36415 COLL VENOUS BLD VENIPUNCTURE: CPT

## 2020-02-16 PROCEDURE — 99285 EMERGENCY DEPT VISIT HI MDM: CPT | Mod: 25

## 2020-02-16 PROCEDURE — 84443 ASSAY THYROID STIM HORMONE: CPT

## 2020-02-16 PROCEDURE — 93005 ELECTROCARDIOGRAM TRACING: CPT

## 2020-02-16 PROCEDURE — 25000003 PHARM REV CODE 250: Performed by: EMERGENCY MEDICINE

## 2020-02-16 PROCEDURE — 84484 ASSAY OF TROPONIN QUANT: CPT | Mod: 91

## 2020-02-16 PROCEDURE — 96360 HYDRATION IV INFUSION INIT: CPT

## 2020-02-16 PROCEDURE — 96361 HYDRATE IV INFUSION ADD-ON: CPT

## 2020-02-16 PROCEDURE — 25000003 PHARM REV CODE 250: Performed by: NURSE PRACTITIONER

## 2020-02-16 RX ORDER — METOPROLOL TARTRATE 50 MG/1
50 TABLET ORAL ONCE
Status: DISCONTINUED | OUTPATIENT
Start: 2020-02-16 | End: 2020-02-16

## 2020-02-16 RX ORDER — SODIUM,POTASSIUM PHOSPHATES 280-250MG
2 POWDER IN PACKET (EA) ORAL
Status: DISCONTINUED | OUTPATIENT
Start: 2020-02-16 | End: 2020-02-17

## 2020-02-16 RX ORDER — METOPROLOL SUCCINATE 25 MG/1
25 TABLET, EXTENDED RELEASE ORAL DAILY
Status: DISCONTINUED | OUTPATIENT
Start: 2020-02-16 | End: 2020-02-17 | Stop reason: HOSPADM

## 2020-02-16 RX ORDER — IBUPROFEN 200 MG
24 TABLET ORAL
Status: DISCONTINUED | OUTPATIENT
Start: 2020-02-16 | End: 2020-02-17

## 2020-02-16 RX ORDER — POTASSIUM CHLORIDE 20 MEQ/1
40 TABLET, EXTENDED RELEASE ORAL ONCE
Status: COMPLETED | OUTPATIENT
Start: 2020-02-16 | End: 2020-02-16

## 2020-02-16 RX ORDER — LISINOPRIL 40 MG/1
40 TABLET ORAL DAILY
Status: DISCONTINUED | OUTPATIENT
Start: 2020-02-16 | End: 2020-02-17 | Stop reason: HOSPADM

## 2020-02-16 RX ORDER — METOPROLOL TARTRATE 25 MG/1
25 TABLET, FILM COATED ORAL ONCE
Status: COMPLETED | OUTPATIENT
Start: 2020-02-16 | End: 2020-02-16

## 2020-02-16 RX ORDER — ACETAMINOPHEN 325 MG/1
650 TABLET ORAL EVERY 4 HOURS PRN
Status: DISCONTINUED | OUTPATIENT
Start: 2020-02-16 | End: 2020-02-17

## 2020-02-16 RX ORDER — LANOLIN ALCOHOL/MO/W.PET/CERES
800 CREAM (GRAM) TOPICAL
Status: DISCONTINUED | OUTPATIENT
Start: 2020-02-16 | End: 2020-02-17

## 2020-02-16 RX ORDER — IBUPROFEN 200 MG
16 TABLET ORAL
Status: DISCONTINUED | OUTPATIENT
Start: 2020-02-16 | End: 2020-02-17

## 2020-02-16 RX ORDER — FAMOTIDINE 20 MG/1
20 TABLET, FILM COATED ORAL 2 TIMES DAILY
Status: DISCONTINUED | OUTPATIENT
Start: 2020-02-16 | End: 2020-02-17 | Stop reason: HOSPADM

## 2020-02-16 RX ORDER — SODIUM CHLORIDE 0.9 % (FLUSH) 0.9 %
10 SYRINGE (ML) INJECTION
Status: DISCONTINUED | OUTPATIENT
Start: 2020-02-16 | End: 2020-02-17 | Stop reason: HOSPADM

## 2020-02-16 RX ORDER — ATORVASTATIN CALCIUM 20 MG/1
20 TABLET, FILM COATED ORAL DAILY
Status: DISCONTINUED | OUTPATIENT
Start: 2020-02-16 | End: 2020-02-16

## 2020-02-16 RX ORDER — KETOROLAC TROMETHAMINE 30 MG/ML
15 INJECTION, SOLUTION INTRAMUSCULAR; INTRAVENOUS EVERY 6 HOURS PRN
Status: DISCONTINUED | OUTPATIENT
Start: 2020-02-16 | End: 2020-02-17

## 2020-02-16 RX ORDER — SODIUM CHLORIDE 9 MG/ML
INJECTION, SOLUTION INTRAVENOUS CONTINUOUS
Status: DISCONTINUED | OUTPATIENT
Start: 2020-02-16 | End: 2020-02-16

## 2020-02-16 RX ORDER — HYDROCHLOROTHIAZIDE 25 MG/1
25 TABLET ORAL DAILY
Status: DISCONTINUED | OUTPATIENT
Start: 2020-02-16 | End: 2020-02-17 | Stop reason: HOSPADM

## 2020-02-16 RX ORDER — POTASSIUM CHLORIDE 20 MEQ/15ML
40 SOLUTION ORAL
Status: DISCONTINUED | OUTPATIENT
Start: 2020-02-16 | End: 2020-02-17

## 2020-02-16 RX ORDER — ONDANSETRON 2 MG/ML
4 INJECTION INTRAMUSCULAR; INTRAVENOUS EVERY 6 HOURS PRN
Status: DISCONTINUED | OUTPATIENT
Start: 2020-02-16 | End: 2020-02-17

## 2020-02-16 RX ORDER — ASPIRIN 325 MG
325 TABLET ORAL
Status: COMPLETED | OUTPATIENT
Start: 2020-02-16 | End: 2020-02-16

## 2020-02-16 RX ORDER — AMLODIPINE BESYLATE 2.5 MG/1
2.5 TABLET ORAL DAILY
Status: DISCONTINUED | OUTPATIENT
Start: 2020-02-16 | End: 2020-02-17 | Stop reason: HOSPADM

## 2020-02-16 RX ORDER — ATORVASTATIN CALCIUM 20 MG/1
20 TABLET, FILM COATED ORAL NIGHTLY
Status: DISCONTINUED | OUTPATIENT
Start: 2020-02-16 | End: 2020-02-17 | Stop reason: HOSPADM

## 2020-02-16 RX ORDER — GLUCAGON 1 MG
1 KIT INJECTION
Status: DISCONTINUED | OUTPATIENT
Start: 2020-02-16 | End: 2020-02-17

## 2020-02-16 RX ORDER — POTASSIUM CHLORIDE 20 MEQ/15ML
60 SOLUTION ORAL
Status: DISCONTINUED | OUTPATIENT
Start: 2020-02-16 | End: 2020-02-17

## 2020-02-16 RX ADMIN — ASPIRIN 325 MG ORAL TABLET 325 MG: 325 PILL ORAL at 04:02

## 2020-02-16 RX ADMIN — METOPROLOL TARTRATE 25 MG: 25 TABLET, FILM COATED ORAL at 05:02

## 2020-02-16 RX ADMIN — ESTROGENS, CONJUGATED 0.9 MG: 0.3 TABLET, FILM COATED ORAL at 10:02

## 2020-02-16 RX ADMIN — POTASSIUM CHLORIDE 40 MEQ: 1500 TABLET, EXTENDED RELEASE ORAL at 05:02

## 2020-02-16 RX ADMIN — SODIUM CHLORIDE: 0.9 INJECTION, SOLUTION INTRAVENOUS at 05:02

## 2020-02-16 RX ADMIN — SODIUM CHLORIDE: 0.9 INJECTION, SOLUTION INTRAVENOUS at 04:02

## 2020-02-16 RX ADMIN — FAMOTIDINE 20 MG: 20 TABLET, FILM COATED ORAL at 10:02

## 2020-02-16 RX ADMIN — METOPROLOL SUCCINATE 25 MG: 25 TABLET, EXTENDED RELEASE ORAL at 10:02

## 2020-02-16 RX ADMIN — FAMOTIDINE 20 MG: 20 TABLET, FILM COATED ORAL at 09:02

## 2020-02-16 RX ADMIN — HYDROCHLOROTHIAZIDE 25 MG: 25 TABLET ORAL at 10:02

## 2020-02-16 RX ADMIN — LISINOPRIL 40 MG: 40 TABLET ORAL at 10:02

## 2020-02-16 RX ADMIN — SODIUM CHLORIDE 1000 ML: 0.9 INJECTION, SOLUTION INTRAVENOUS at 03:02

## 2020-02-16 NOTE — SUBJECTIVE & OBJECTIVE
Past Medical History:   Diagnosis Date    Colon torsion     Gastroparesis     GERD (gastroesophageal reflux disease)     Hyperlipidemia     Hypertension     Sciatica        Past Surgical History:   Procedure Laterality Date    ABDOMINAL SURGERY      APPENDECTOMY      HYSTERECTOMY      OOPHORECTOMY         Review of patient's allergies indicates:   Allergen Reactions    Bee sting [allergen ext-venom-honey bee] Swelling    Iodine and iodide containing products Palpitations       No current facility-administered medications on file prior to encounter.      Current Outpatient Medications on File Prior to Encounter   Medication Sig    amLODIPine (NORVASC) 2.5 MG tablet Take 1 tablet (2.5 mg total) by mouth once daily.    atorvastatin (LIPITOR) 20 MG tablet Take 1 tablet (20 mg total) by mouth once daily.    estrogens, conjugated, (PREMARIN) 0.9 MG Tab TAKE 1 TABLET(0.9 MG) BY MOUTH EVERY DAY    famotidine (PEPCID) 20 MG tablet Take 1 tablet (20 mg total) by mouth 2 (two) times daily.    levocetirizine (XYZAL) 5 MG tablet TK 1 T PO  QAM PRF SINUS ALLERGY OR DRIP    lisinopril-hydrochlorothiazide (PRINZIDE,ZESTORETIC) 20-12.5 mg per tablet Take 2 tablets by mouth once daily.    metoprolol succinate (TOPROL-XL) 50 MG 24 hr tablet Take 0.5 tablets (25 mg total) by mouth once daily. TAKE 1 TABLET(50 MG) BY MOUTH EVERY DAY    ondansetron (ZOFRAN-ODT) 4 MG TbDL Take 1 tablet (4 mg total) by mouth every 6 (six) hours as needed (for vomiting or nausea). (Patient not taking: Reported on 2/7/2020)    promethazine (PHENERGAN) 25 MG suppository Place 1 suppository (25 mg total) rectally every 6 (six) hours as needed for Nausea. (Patient not taking: Reported on 2/7/2020)     Family History     Problem Relation (Age of Onset)    Breast cancer Sister (58)    Cancer Maternal Grandmother    Ovarian cancer Maternal Grandmother        Tobacco Use    Smoking status: Never Smoker    Smokeless tobacco: Never Used    Substance and Sexual Activity    Alcohol use: No    Drug use: No    Sexual activity: Not on file     Review of Systems   Constitutional: Negative for chills and fever.   HENT: Negative for congestion and mouth sores.    Eyes: Negative for photophobia and visual disturbance.   Respiratory: Negative for cough and shortness of breath.    Cardiovascular: Positive for palpitations. Negative for chest pain.   Gastrointestinal: Negative for abdominal pain, diarrhea, nausea and vomiting.   Endocrine: Negative for polydipsia and polyuria.   Genitourinary: Negative for dysuria and enuresis.   Musculoskeletal: Negative for arthralgias and gait problem.   Skin: Negative for rash and wound.   Neurological: Positive for dizziness, light-headedness and headaches.   Hematological: Negative for adenopathy.   Psychiatric/Behavioral: Negative for agitation and confusion.   All other systems reviewed and are negative.    Objective:     Vital Signs (Most Recent):  Temp: 96.6 °F (35.9 °C) (02/16/20 0453)  Pulse: 67 (02/16/20 0453)  Resp: 17 (02/16/20 0453)  BP: (!) 158/73 (02/16/20 0453)  SpO2: 100 % (02/16/20 0453) Vital Signs (24h Range):  Temp:  [96.6 °F (35.9 °C)-98 °F (36.7 °C)] 96.6 °F (35.9 °C)  Pulse:  [67-88] 67  Resp:  [17-18] 17  SpO2:  [98 %-100 %] 100 %  BP: (149-181)/(66-97) 158/73     Weight: 66.5 kg (146 lb 9.7 oz)  Body mass index is 25.16 kg/m².    Physical Exam   Constitutional: She is oriented to person, place, and time. She appears well-developed and well-nourished.   HENT:   Head: Normocephalic and atraumatic.   Eyes: Pupils are equal, round, and reactive to light. Conjunctivae and EOM are normal.   Neck: Normal range of motion. Neck supple. No JVD present.   Cardiovascular: Normal rate, regular rhythm and intact distal pulses.   Pulmonary/Chest: Effort normal and breath sounds normal. No respiratory distress.   Abdominal: Soft. Bowel sounds are normal. She exhibits no distension. There is no tenderness.    Genitourinary:   Genitourinary Comments: Deferred     Musculoskeletal: Normal range of motion. She exhibits no edema.   Neurological: She is alert and oriented to person, place, and time. No cranial nerve deficit.   Skin: Skin is warm and dry. Capillary refill takes 2 to 3 seconds.   Psychiatric: She has a normal mood and affect. Her behavior is normal. Judgment and thought content normal.   Vitals reviewed.        CRANIAL NERVES     CN III, IV, VI   Pupils are equal, round, and reactive to light.  Extraocular motions are normal.        Significant Labs:   CBC:   Recent Labs   Lab 02/16/20  0250   WBC 7.93   HGB 14.1   HCT 43.1        CMP:   Recent Labs   Lab 02/16/20  0250      K 3.4*      CO2 29      BUN 17   CREATININE 0.8   CALCIUM 9.7   PROT 7.5   ALBUMIN 4.1   BILITOT 0.2   ALKPHOS 87   AST 39   ALT 40   ANIONGAP 12   EGFRNONAA >60     Troponin:   Recent Labs   Lab 02/16/20  0250   TROPONINI 0.027*

## 2020-02-16 NOTE — ASSESSMENT & PLAN NOTE
Troponin 0.027 on admission.  Likely secondary to tachycardia.  Continue to trend.  Consider Cardiology consult.  Will resume beta-blocker.

## 2020-02-16 NOTE — ED TRIAGE NOTES
Taking new meds to help with her heart rate.  Having severe side effects.  Heart rate changing from 60's to 100's.

## 2020-02-16 NOTE — HPI
Marga Pak is a 65-year-old female with a past medical history of hypertension and hyperlipidemia who follows with Dr. Pedro.  She presented to the emergency department [facility] with complaints of hypertension, dizziness, palpitations, and heart rate of 120 which she noted at home.  Patient was seen by the cardiology PA on 02/07/2020 with complaints of hypertension, headache, and nausea.  At that time, patient's medications were adjusted to reflect an increase in her lisinopril/hydrochlorothiazide from 20/12.5 mg daily to 40/25 mg daily.  On 02/13/2020, patient notified her doctor's office that her symptoms had continued.  At that time, Norvasc 2.5 mg daily was added to her regimen and per the PAs documentation, patient was to decrease her metoprolol from 50 mg daily to 25 mg daily due to bradycardia.  Patient reports at this time that her metoprolol was discontinued; however, I do not find that documentation per her physician.  Patient states that her last dose of metoprolol was 2 days ago.  She further reports that she has taken metoprolol for 18 years.  While in the emergency department patient was noted to be mildly hypertensive with lab significant for elevated troponin in the absence of chest pain or ischemic EKG changes.  She denies fever, shortness of breath, abdominal pain, or further episodes of palpitations since initially awakening [facility].  Patient will be placed in observation for further medical care.

## 2020-02-16 NOTE — CONSULTS
[facility]  Cardiology  Consult Note    Patient Name: Marga Pak  MRN: [id number]  Admission Date: 2/16/2020  Hospital Length of Stay: 0 days  Code Status: Full Code   Attending Provider: Erna Connors MD   Consulting Provider: Saul Mcclelland MD  Primary Care Physician: Abdoulaye Lopez MD  Principal Problem:Demand ischemia    Patient information was obtained from patient and ER records.     Consults  Subjective:     Chief Complaint:  Elevated blood pressure     HPI:  65-year-old lady with longstanding hypertension had some difficulty with the blood pressures.  Primary care physician tried some new medications in the process metoprolol got stopped.  Patient reports she has been on this medicine for 15 years.  About 2 days after stopping metoprolol she started feeling palpitations racing of heart and panicky feeling there is what brought her to the hospital.  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  There is strong family history of hypertension.  She had a stress test many years ago.  She is under care of Dr. Adi Tucker.    Past Medical History:   Diagnosis Date    Colon torsion     Gastroparesis     GERD (gastroesophageal reflux disease)     Hyperlipidemia     Hypertension     Sciatica        Past Surgical History:   Procedure Laterality Date    ABDOMINAL SURGERY      APPENDECTOMY      HYSTERECTOMY      OOPHORECTOMY         Review of patient's allergies indicates:   Allergen Reactions    Bee sting [allergen ext-venom-honey bee] Swelling    Iodine and iodide containing products Palpitations       No current facility-administered medications on file prior to encounter.      Current Outpatient Medications on File Prior to Encounter   Medication Sig    amLODIPine (NORVASC) 2.5 MG tablet Take 1 tablet (2.5 mg total) by mouth once daily.    atorvastatin (LIPITOR) 20 MG tablet Take 1 tablet (20 mg total) by  mouth once daily.    lisinopril-hydrochlorothiazide (PRINZIDE,ZESTORETIC) 20-12.5 mg per tablet Take 2 tablets by mouth once daily.    metoprolol succinate (TOPROL-XL) 50 MG 24 hr tablet Take 0.5 tablets (25 mg total) by mouth once daily. TAKE 1 TABLET(50 MG) BY MOUTH EVERY DAY    estrogens, conjugated, (PREMARIN) 0.9 MG Tab TAKE 1 TABLET(0.9 MG) BY MOUTH EVERY DAY    famotidine (PEPCID) 20 MG tablet Take 1 tablet (20 mg total) by mouth 2 (two) times daily.    levocetirizine (XYZAL) 5 MG tablet TK 1 T PO  QAM PRF SINUS ALLERGY OR DRIP    ondansetron (ZOFRAN-ODT) 4 MG TbDL Take 1 tablet (4 mg total) by mouth every 6 (six) hours as needed (for vomiting or nausea). (Patient not taking: Reported on 2/7/2020)    promethazine (PHENERGAN) 25 MG suppository Place 1 suppository (25 mg total) rectally every 6 (six) hours as needed for Nausea. (Patient not taking: Reported on 2/7/2020)     Family History     Problem Relation (Age of Onset)    Breast cancer Sister (58)    Cancer Maternal Grandmother    Ovarian cancer Maternal Grandmother        Tobacco Use    Smoking status: Never Smoker    Smokeless tobacco: Never Used   Substance and Sexual Activity    Alcohol use: No    Drug use: No    Sexual activity: Not on file     ROS  Objective:     Vital Signs (Most Recent):  Temp: 96.4 °F (35.8 °C) (02/16/20 1258)  Pulse: (!) 59 (02/16/20 1258)  Resp: 18 (02/16/20 1258)  BP: (!) 145/63 (02/16/20 1258)  SpO2: 99 % (02/16/20 1258) Vital Signs (24h Range):  Temp:  [96.4 °F (35.8 °C)-98 °F (36.7 °C)] 96.4 °F (35.8 °C)  Pulse:  [59-88] 59  Resp:  [17-18] 18  SpO2:  [98 %-100 %] 99 %  BP: (145-181)/(63-97) 145/63     Weight: 66.5 kg (146 lb 9.7 oz)  Body mass index is 25.16 kg/m².    SpO2: 99 %  O2 Device (Oxygen Therapy): room air      Intake/Output Summary (Last 24 hours) at 2/16/2020 7237  Last data filed at 2/16/2020 0417  Gross per 24 hour   Intake 1000 ml   Output --   Net 1000 ml       Lines/Drains/Airways     Peripheral  Intravenous Line                 Peripheral IV - Single Lumen 02/16/20 0315 20 G Left Antecubital less than 1 day                Physical Exam comfortable in no cardiorespiratory distress.  Last set of vital signs temperature 96.4° pulse 59 respirations 18 blood pressure 145/63 oxygen saturation 99%  Cardiac exam reveals pulse regular bradycardic normal volume.  Heart sounds are normal no gallop rhythm or murmurs.  Examination of the neck no thyroid swelling or carotid bruit.  Lungs are clear to auscultation  Lower extremities no edema  Reports her fingers feel puffy since she was started on normal saline infusion in hospital.  She is fully oriented alert no neurologic deficit    Significant Labs:   CMP   Recent Labs   Lab 02/16/20  0250      K 3.4*      CO2 29      BUN 17   CREATININE 0.8   CALCIUM 9.7   PROT 7.5   ALBUMIN 4.1   BILITOT 0.2   ALKPHOS 87   AST 39   ALT 40   ANIONGAP 12   ESTGFRAFRICA >60   EGFRNONAA >60   , CBC   Recent Labs   Lab 02/16/20  0250   WBC 7.93   HGB 14.1   HCT 43.1       and Troponin   Recent Labs   Lab 02/16/20  0250 02/16/20  0842 02/16/20  1412   TROPONINI 0.027* 0.017 0.029*       Significant Imaging:  ECG 8-70 7:00 a.m. shows some sinus rhythm rate 87 with first-degree AV block poor R-wave progression which the computer calls anterior infarct right do see small R-waves in V1 V2 V3.  Chest x-ray shows normal size heart and clear lung fields  Assessment and Plan:  Tachycardia secondary to beta-blocker withdrawal.  Longstanding hypertension with recent difficulty in good control  Very slightly elevated troponin  Plan  Troponin rise and fall are not consistent with myocardial injury.  It could possibly from tachycardia however at times troponin is elevated suggesting underlying coronary artery disease therefore I would recommend stress Myoview study in a.m.  For blood pressure control lisinopril HCT 40/25, amlodipine 5 mg once daily and metoprolol succinate 50  mg daily would be my recommendation.  If the stress test is negative she can be discharged in a.m. to follow up with her usual cardiologist.     Active Diagnoses:    Diagnosis Date Noted POA    PRINCIPAL PROBLEM:  Demand ischemia [I24.8] 02/16/2020 Yes    Hypokalemia [E87.6] 02/16/2020 Yes    Hyperlipidemia [E78.5] 05/22/2018 Yes    Essential hypertension [I10] 01/16/2017 Yes      Problems Resolved During this Admission:       VTE Risk Mitigation (From admission, onward)         Ordered     IP VTE LOW RISK PATIENT  Once      02/16/20 0451     Place ALMAZ hose  Until discontinued      02/16/20 0451     Place sequential compression device  Until discontinued      02/16/20 0451                Thank you for your consult. I will follow-up with patient. Please contact us if you have any additional questions.    Saul Mcclelland MD  Cardiology   [facility]

## 2020-02-16 NOTE — NURSING
Pt c/o discomfort in left shoulder due to IV beeping and keeping arm straight pt states from being in the bend of my arm.

## 2020-02-16 NOTE — H&P
[facility] Medicine  History & Physical    Patient Name: Marga Pak  MRN: [id number]  Admission Date: 2/16/2020  Attending Physician: Erna Connors MD   Primary Care Provider: Abdoulaye Lopez MD         Patient information was obtained from patient, past medical records and ER records.     Subjective:     Principal Problem:Demand ischemia    Chief Complaint:   Chief Complaint   Patient presents with    Dizziness     having heart rate issues and bp issues        HPI: Marga Pak is a 65-year-old female with a past medical history of hypertension and hyperlipidemia who follows with Dr. Pedro.  [facility], dizziness, palpitations, and heart rate of 120 which she noted at home.  Patient was seen by the cardiology PA on 02/07/2020 with complaints of hypertension, headache, and nausea.  At that time, patient's medications were adjusted to reflect an increase in her lisinopril/hydrochlorothiazide from 20/12.5 mg daily to 40/25 mg daily.  On 02/13/2020, patient notified her doctor's office that her symptoms had continued.  At that time, Norvasc 2.5 mg daily was added to her regimen and per the PAs documentation, patient was to decrease her metoprolol from 50 mg daily to 25 mg daily due to bradycardia.  Patient reports at this time that her metoprolol was discontinued; however, I do not find that documentation per her physician.  Patient states that her last dose of metoprolol was 2 days ago.  She further reports that she has taken metoprolol for 18 years.  While in the emergency department patient was noted to be mildly hypertensive with lab significant for elevated troponin in the absence of chest pain or ischemic EKG changes.  She denies fever, shortness of breath, abdominal pain, or further episodes of palpitations since initially awakening [facility].  Patient will be placed in observation for  further medical care.    Past Medical History:   Diagnosis Date    Colon torsion     Gastroparesis     GERD (gastroesophageal reflux disease)     Hyperlipidemia     Hypertension     Sciatica        Past Surgical History:   Procedure Laterality Date    ABDOMINAL SURGERY      APPENDECTOMY      HYSTERECTOMY      OOPHORECTOMY         Review of patient's allergies indicates:   Allergen Reactions    Bee sting [allergen ext-venom-honey bee] Swelling    Iodine and iodide containing products Palpitations       No current facility-administered medications on file prior to encounter.      Current Outpatient Medications on File Prior to Encounter   Medication Sig    amLODIPine (NORVASC) 2.5 MG tablet Take 1 tablet (2.5 mg total) by mouth once daily.    atorvastatin (LIPITOR) 20 MG tablet Take 1 tablet (20 mg total) by mouth once daily.    estrogens, conjugated, (PREMARIN) 0.9 MG Tab TAKE 1 TABLET(0.9 MG) BY MOUTH EVERY DAY    famotidine (PEPCID) 20 MG tablet Take 1 tablet (20 mg total) by mouth 2 (two) times daily.    levocetirizine (XYZAL) 5 MG tablet TK 1 T PO  QAM PRF SINUS ALLERGY OR DRIP    lisinopril-hydrochlorothiazide (PRINZIDE,ZESTORETIC) 20-12.5 mg per tablet Take 2 tablets by mouth once daily.    metoprolol succinate (TOPROL-XL) 50 MG 24 hr tablet Take 0.5 tablets (25 mg total) by mouth once daily. TAKE 1 TABLET(50 MG) BY MOUTH EVERY DAY    ondansetron (ZOFRAN-ODT) 4 MG TbDL Take 1 tablet (4 mg total) by mouth every 6 (six) hours as needed (for vomiting or nausea). (Patient not taking: Reported on 2/7/2020)    promethazine (PHENERGAN) 25 MG suppository Place 1 suppository (25 mg total) rectally every 6 (six) hours as needed for Nausea. (Patient not taking: Reported on 2/7/2020)     Family History     Problem Relation (Age of Onset)    Breast cancer Sister (58)    Cancer Maternal Grandmother    Ovarian cancer Maternal Grandmother        Tobacco Use    Smoking status: Never Smoker    Smokeless  tobacco: Never Used   Substance and Sexual Activity    Alcohol use: No    Drug use: No    Sexual activity: Not on file     Review of Systems   Constitutional: Negative for chills and fever.   HENT: Negative for congestion and mouth sores.    Eyes: Negative for photophobia and visual disturbance.   Respiratory: Negative for cough and shortness of breath.    Cardiovascular: Positive for palpitations. Negative for chest pain.   Gastrointestinal: Negative for abdominal pain, diarrhea, nausea and vomiting.   Endocrine: Negative for polydipsia and polyuria.   Genitourinary: Negative for dysuria and enuresis.   Musculoskeletal: Negative for arthralgias and gait problem.   Skin: Negative for rash and wound.   Neurological: Positive for dizziness, light-headedness and headaches.   Hematological: Negative for adenopathy.   Psychiatric/Behavioral: Negative for agitation and confusion.   All other systems reviewed and are negative.    Objective:     Vital Signs (Most Recent):  Temp: 96.6 °F (35.9 °C) (02/16/20 0453)  Pulse: 67 (02/16/20 0453)  Resp: 17 (02/16/20 0453)  BP: (!) 158/73 (02/16/20 0453)  SpO2: 100 % (02/16/20 0453) Vital Signs (24h Range):  Temp:  [96.6 °F (35.9 °C)-98 °F (36.7 °C)] 96.6 °F (35.9 °C)  Pulse:  [67-88] 67  Resp:  [17-18] 17  SpO2:  [98 %-100 %] 100 %  BP: (149-181)/(66-97) 158/73     Weight: 66.5 kg (146 lb 9.7 oz)  Body mass index is 25.16 kg/m².    Physical Exam   Constitutional: She is oriented to person, place, and time. She appears well-developed and well-nourished.   HENT:   Head: Normocephalic and atraumatic.   Eyes: Pupils are equal, round, and reactive to light. Conjunctivae and EOM are normal.   Neck: Normal range of motion. Neck supple. No JVD present.   Cardiovascular: Normal rate, regular rhythm and intact distal pulses.   Pulmonary/Chest: Effort normal and breath sounds normal. No respiratory distress.   Abdominal: Soft. Bowel sounds are normal. She exhibits no distension. There is  no tenderness.   Genitourinary:   Genitourinary Comments: Deferred     Musculoskeletal: Normal range of motion. She exhibits no edema.   Neurological: She is alert and oriented to person, place, and time. No cranial nerve deficit.   Skin: Skin is warm and dry. Capillary refill takes 2 to 3 seconds.   Psychiatric: She has a normal mood and affect. Her behavior is normal. Judgment and thought content normal.   Vitals reviewed.        CRANIAL NERVES     CN III, IV, VI   Pupils are equal, round, and reactive to light.  Extraocular motions are normal.        Significant Labs:   CBC:   Recent Labs   Lab 02/16/20 0250   WBC 7.93   HGB 14.1   HCT 43.1        CMP:   Recent Labs   Lab 02/16/20 0250      K 3.4*      CO2 29      BUN 17   CREATININE 0.8   CALCIUM 9.7   PROT 7.5   ALBUMIN 4.1   BILITOT 0.2   ALKPHOS 87   AST 39   ALT 40   ANIONGAP 12   EGFRNONAA >60     Troponin:   Recent Labs   Lab 02/16/20 0250   TROPONINI 0.027*           Assessment/Plan:     * Demand ischemia  Troponin 0.027 on admission.  Likely secondary to tachycardia.  Continue to trend.  Consider Cardiology consult.  Will resume beta-blocker.      Hypokalemia  Potassium 3.4 on admission.  Replete it.  Monitor potassium level and replete as needed.      Hyperlipidemia  Chronic.  Continue statin.      Essential hypertension  Chronic, uncontrolled.  Patient has had several medication changes as described in HPI.  Resume beta-blocker and continue other antihypertensives.  Monitor blood pressure closely and make adjustments as indicated for sustained control.          VTE Risk Mitigation (From admission, onward)         Ordered     IP VTE LOW RISK PATIENT  Once      02/16/20 0451     Place ALMAZ hose  Until discontinued      02/16/20 0451     Place sequential compression device  Until discontinued      02/16/20 0451            Time spent seeing patient( greater than 1/2 spent in direct contact) : 40 minutes       Elisha DEL REAL  PO Peña  Department of Hospital Medicine   [facility]

## 2020-02-16 NOTE — ASSESSMENT & PLAN NOTE
Chronic, uncontrolled.  Patient has had several medication changes as described in HPI.  Resume beta-blocker and continue other antihypertensives.  Monitor blood pressure closely and make adjustments as indicated for sustained control.

## 2020-02-16 NOTE — ED PROVIDER NOTES
Encounter Date: 2/16/2020    SCRIBE #1 NOTE: I, Nina Huynh, am scribing for, and in the presence of, Dr. Mccall.       History     Chief Complaint   Patient presents with    Dizziness     having heart rate issues and bp issues       Time seen by provider: 2:41 AM on 02/16/2020    Marga Pak is a 65 y.o. female who presents to the ED with c/o dizziness and heart palpitations that happened 2 hours ago. She states she was feeling restless trying to fall asleep when she checked her BP and HR and found that it was elevated. She notes several episodes of dizziness over the past few days and an intermittent HA. She recently d/c Metoprolol, started Norvasc with a subsequent increase in dose and her HCTZ was increased. Patient also c/o frequent urination over the past day. No burning with urination, blood in the urine, fever, n/v/d, changes in weight or night terrors. PMHx of MVP, sleep apnea and HTN.         The history is provided by the patient.     Review of patient's allergies indicates:   Allergen Reactions    Bee sting [allergen ext-venom-honey bee] Swelling    Iodine and iodide containing products Palpitations     Past Medical History:   Diagnosis Date    Colon torsion     Gastroparesis     GERD (gastroesophageal reflux disease)     Hyperlipidemia     Hypertension     Sciatica      Past Surgical History:   Procedure Laterality Date    ABDOMINAL SURGERY      APPENDECTOMY      HYSTERECTOMY      OOPHORECTOMY       Family History   Problem Relation Age of Onset    Breast cancer Sister 58    Cancer Maternal Grandmother         stomach    Ovarian cancer Maternal Grandmother      Social History     Tobacco Use    Smoking status: Never Smoker    Smokeless tobacco: Never Used   Substance Use Topics    Alcohol use: No    Drug use: No     Review of Systems   Constitutional: Negative for fever and unexpected weight change.   HENT: Negative for congestion.    Eyes: Negative for visual disturbance.    Respiratory: Negative for wheezing.    Cardiovascular: Positive for palpitations. Negative for chest pain.   Gastrointestinal: Negative for abdominal pain, diarrhea, nausea and vomiting.   Genitourinary: Negative for dysuria and hematuria.   Musculoskeletal: Negative for joint swelling.   Skin: Negative for rash.   Neurological: Positive for dizziness and headaches. Negative for syncope.   Hematological: Does not bruise/bleed easily.   Psychiatric/Behavioral: Negative for confusion.       Physical Exam     Initial Vitals [02/16/20 0159]   BP Pulse Resp Temp SpO2   (!) 178/97 88 18 98 °F (36.7 °C) 99 %      MAP       --         Physical Exam    Nursing note and vitals reviewed.  Constitutional: She appears well-nourished.   HENT:   Head: Normocephalic and atraumatic.   Eyes: Conjunctivae and EOM are normal.   Neck: Normal range of motion. Neck supple. No thyroid mass present.   Cardiovascular: Normal rate, regular rhythm and normal heart sounds. Exam reveals no gallop and no friction rub.    No murmur heard.  Pulmonary/Chest: Breath sounds normal. She has no wheezes. She has no rhonchi. She has no rales.   Abdominal: Soft. Normal appearance and bowel sounds are normal. There is no tenderness.   Neurological: She is alert and oriented to person, place, and time. She has normal strength. No cranial nerve deficit or sensory deficit.   Skin: Skin is warm and dry. No rash noted. No erythema.   Psychiatric: She has a normal mood and affect. Her speech is normal. Cognition and memory are normal.         ED Course   Procedures  Labs Reviewed   CBC W/ AUTO DIFFERENTIAL - Abnormal; Notable for the following components:       Result Value    Mean Corpuscular Hemoglobin 31.2 (*)     All other components within normal limits   COMPREHENSIVE METABOLIC PANEL - Abnormal; Notable for the following components:    Potassium 3.4 (*)     All other components within normal limits   TROPONIN I - Abnormal; Notable for the following  components:    Troponin I 0.027 (*)     All other components within normal limits   B-TYPE NATRIURETIC PEPTIDE   D DIMER, QUANTITATIVE   URINALYSIS, REFLEX TO URINE CULTURE    Narrative:     Preferred Collection Type->Urine, Clean Catch     EKG Readings: (Independently Interpreted)   Initial Reading: No STEMI. Rhythm: Normal Sinus Rhythm. Heart Rate: 87.   1st degree AV block with borderline LAD. Anterior infarct, age undetermined.        Imaging Results          X-Ray Chest AP Portable (In process)                  Medical Decision Making:   History:   Old Medical Records: I decided to obtain old medical records.  Independently Interpreted Test(s):   I have ordered and independently interpreted EKG Reading(s) - see prior notes  Clinical Tests:   Lab Tests: Ordered and Reviewed  Radiological Study: Ordered and Reviewed  Medical Tests: Reviewed and Ordered  ED Management:  This patient was interviewed and assessed emergently.  Initial vital signs are stable. EKG is unchanged from prior studies.  Screening labs are found to be largely unremarkable with the exception of a mild elevation of troponin just above the threshold limits of normal at 0.026.  Patient is provided aspirin.  Patient additionally has had intermittent hypertension in the emergency department and has recently changed multiple antihypertensive medications.  Patient does warrant observation for further troponin trending and treatment for accelerated hypertension.  Case discussed with and accepted by the on-call hospitalist.  Patient and sister updated on the plan of care and they are in agreement with this disposition and she is transported to an observation guarded condition.            Scribe Attestation:   Scribe #1: I performed the above scribed service and the documentation accurately describes the services I performed. I attest to the accuracy of the note.                   I, Dr. Baron Mccall, personally performed the services described in this  documentation. All medical record entries made by the scribe were at my direction and in my presence.  I have reviewed the chart and agree that the record reflects my personal performance and is accurate and complete. Baron Mccall MD.  4:58 AM 02/16/2020          Clinical Impression:       ICD-10-CM ICD-9-CM   1. Arrhythmia I49.9 427.9   2. Palpitations R00.2 785.1   3. Elevated troponin R79.89 790.6         Disposition:   Disposition: Placed in Observation  Condition: Fair                     Baron Mccall MD  02/16/20 0459

## 2020-02-17 VITALS
HEIGHT: 64 IN | DIASTOLIC BLOOD PRESSURE: 68 MMHG | TEMPERATURE: 99 F | HEART RATE: 55 BPM | WEIGHT: 147.94 LBS | BODY MASS INDEX: 25.25 KG/M2 | OXYGEN SATURATION: 100 % | SYSTOLIC BLOOD PRESSURE: 142 MMHG | RESPIRATION RATE: 18 BRPM

## 2020-02-17 LAB
ALBUMIN SERPL BCP-MCNC: 3.5 G/DL (ref 3.5–5.2)
ALP SERPL-CCNC: 65 U/L (ref 55–135)
ALT SERPL W/O P-5'-P-CCNC: 25 U/L (ref 10–44)
ANION GAP SERPL CALC-SCNC: 10 MMOL/L (ref 8–16)
AST SERPL-CCNC: 21 U/L (ref 10–40)
BASOPHILS # BLD AUTO: 0.06 K/UL (ref 0–0.2)
BASOPHILS NFR BLD: 0.9 % (ref 0–1.9)
BILIRUB SERPL-MCNC: 0.3 MG/DL (ref 0.1–1)
BUN SERPL-MCNC: 16 MG/DL (ref 8–23)
CALCIUM SERPL-MCNC: 9 MG/DL (ref 8.7–10.5)
CHLORIDE SERPL-SCNC: 103 MMOL/L (ref 95–110)
CO2 SERPL-SCNC: 27 MMOL/L (ref 23–29)
CREAT SERPL-MCNC: 0.8 MG/DL (ref 0.5–1.4)
DIFFERENTIAL METHOD: ABNORMAL
EOSINOPHIL # BLD AUTO: 0.1 K/UL (ref 0–0.5)
EOSINOPHIL NFR BLD: 1.4 % (ref 0–8)
ERYTHROCYTE [DISTWIDTH] IN BLOOD BY AUTOMATED COUNT: 11.9 % (ref 11.5–14.5)
EST. GFR  (AFRICAN AMERICAN): >60 ML/MIN/1.73 M^2
EST. GFR  (NON AFRICAN AMERICAN): >60 ML/MIN/1.73 M^2
GLUCOSE SERPL-MCNC: 91 MG/DL (ref 70–110)
HCT VFR BLD AUTO: 39.3 % (ref 37–48.5)
HGB BLD-MCNC: 12.8 G/DL (ref 12–16)
IMM GRANULOCYTES # BLD AUTO: 0.01 K/UL (ref 0–0.04)
LYMPHOCYTES # BLD AUTO: 3.9 K/UL (ref 1–4.8)
LYMPHOCYTES NFR BLD: 59.5 % (ref 18–48)
MCH RBC QN AUTO: 30.9 PG (ref 27–31)
MCHC RBC AUTO-ENTMCNC: 32.6 G/DL (ref 32–36)
MCV RBC AUTO: 95 FL (ref 82–98)
MONOCYTES # BLD AUTO: 0.5 K/UL (ref 0.3–1)
MONOCYTES NFR BLD: 7.1 % (ref 4–15)
NEUTROPHILS # BLD AUTO: 2.1 K/UL (ref 1.8–7.7)
NEUTROPHILS NFR BLD: 30.9 % (ref 38–73)
NRBC BLD-RTO: 0 /100 WBC
PLATELET # BLD AUTO: 294 K/UL (ref 150–350)
PMV BLD AUTO: 10 FL (ref 9.2–12.9)
POTASSIUM SERPL-SCNC: 3.7 MMOL/L (ref 3.5–5.1)
PROT SERPL-MCNC: 6.5 G/DL (ref 6–8.4)
RBC # BLD AUTO: 4.14 M/UL (ref 4–5.4)
SODIUM SERPL-SCNC: 140 MMOL/L (ref 136–145)
WBC # BLD AUTO: 6.61 K/UL (ref 3.9–12.7)

## 2020-02-17 PROCEDURE — 85025 COMPLETE CBC W/AUTO DIFF WBC: CPT

## 2020-02-17 PROCEDURE — 25000003 PHARM REV CODE 250: Performed by: NURSE PRACTITIONER

## 2020-02-17 PROCEDURE — 80053 COMPREHEN METABOLIC PANEL: CPT

## 2020-02-17 PROCEDURE — G0378 HOSPITAL OBSERVATION PER HR: HCPCS

## 2020-02-17 PROCEDURE — 36415 COLL VENOUS BLD VENIPUNCTURE: CPT

## 2020-02-17 RX ORDER — METOPROLOL SUCCINATE 50 MG/1
50 TABLET, EXTENDED RELEASE ORAL DAILY
Qty: 30 TABLET | Refills: 0
Start: 2020-02-17 | End: 2020-04-01 | Stop reason: SDUPTHER

## 2020-02-17 RX ADMIN — FAMOTIDINE 20 MG: 20 TABLET, FILM COATED ORAL at 12:02

## 2020-02-17 RX ADMIN — HYDROCHLOROTHIAZIDE 25 MG: 25 TABLET ORAL at 12:02

## 2020-02-17 RX ADMIN — ESTROGENS, CONJUGATED 0.9 MG: 0.3 TABLET, FILM COATED ORAL at 12:02

## 2020-02-17 RX ADMIN — LISINOPRIL 40 MG: 40 TABLET ORAL at 12:02

## 2020-02-17 RX ADMIN — METOPROLOL SUCCINATE 25 MG: 25 TABLET, EXTENDED RELEASE ORAL at 12:02

## 2020-02-17 NOTE — CONSULTS
[facility]  Cardiology  Progress Note    Patient Name: Marga Pak  MRN: [id number]  Admission Date: 2/16/2020  Hospital Length of Stay: 0 days  Code Status: Full Code   Attending Physician: Namita Altamirano MD   Primary Care Physician: Abdoulaye Lopez MD  Expected Discharge Date:   Principal Problem:Demand ischemia    Subjective:     Hospital Course:  Patient feels well.  She did very well on the treadmill with no ischemia shift.  Nuclear is pending    Interval History:  She had been taken off of metoprolol and placed on amlodipine and that is when her blood pressure and heart rate nick.  She does well on the metoprolol or even though her rate slowed shoe walks on the trace approximately 5-7 miles a day and is very active without any cardiac symptomatology    ROS  Objective:     Vital Signs (Most Recent):  Temp: 97.2 °F (36.2 °C) (02/17/20 0757)  Pulse: 61 (02/17/20 1131)  Resp: 17 (02/17/20 0757)  BP: (!) 154/95 (02/17/20 1131)  SpO2: 100 % (02/17/20 0757) Vital Signs (24h Range):  Temp:  [96.4 °F (35.8 °C)-98 °F (36.7 °C)] 97.2 °F (36.2 °C)  Pulse:  [55-67] 61  Resp:  [17-18] 17  SpO2:  [99 %-100 %] 100 %  BP: (145-185)/(63-95) 154/95     Weight: 67.1 kg (147 lb 14.9 oz)  Body mass index is 25.39 kg/m².    SpO2: 100 %  O2 Device (Oxygen Therapy): room air    No intake or output data in the 24 hours ending 02/17/20 1153    Lines/Drains/Airways     Peripheral Intravenous Line                 Peripheral IV - Single Lumen 02/16/20 0315 20 G Left Antecubital 1 day                Physical Exam neck no bruit lungs clear cardiac regular no murmurs    Significant Labs:     Significant Imaging:   Assessment and Plan:   Rec- add low-dose amlodipine as well as the lisinopril HydroDI keeping the patient on metoprolol and  discharge home today  If the MPI is negative discharge home today    Brief HPI    Active Diagnoses:    Diagnosis Date Noted POA    PRINCIPAL PROBLEM:  Demand ischemia [I24.8]  02/16/2020 Yes    Hypokalemia [E87.6] 02/16/2020 Yes    Hyperlipidemia [E78.5] 05/22/2018 Yes    Essential hypertension [I10] 01/16/2017 Yes      Problems Resolved During this Admission:       VTE Risk Mitigation (From admission, onward)         Ordered     IP VTE LOW RISK PATIENT  Once      02/16/20 0451     Place ALMAZ hose  Until discontinued      02/16/20 0451     Place sequential compression device  Until discontinued      02/16/20 0451                aJn Willson MD  Cardiology  [facility]

## 2020-02-17 NOTE — PLAN OF CARE
SW met with patient to complete a discharge planning assessment. Patient presents as alert and oriented x 4, pleasant, good eye contact, well groomed, recall good, concentration/judgement good, average intelligence, calm, communicative, cooperative and asking and answering questions appropriately. SW verified all information on demographic sheet is correct. Patient reports living with sister and that she is independent with ADLs at this time and does drive. Patient reports sister will transport pt home.    Patient reports does not have home health. Patient reports that she is not receiving outside resources. Patient reports having no medical equipment. Patient reports Cecily Oneil MD as pt's PCP and has [employer] Medicare as their insurance. Patient reports receiving medications from [facility] on [address] and reports that she is able to afford medications at this time and at time of discharge. Patient reports that she is not on dialysis at this time. Patient reports that she is not receiving services with the coumadin clinic. Patient reports has not been admitted to the hospital within the last 30 days.    Patient reports does not have a Living Will or Healthcare Power of . Patient denies mental health issues.    Patient expressed no other needs at this time. SW remains available.       02/16/20 1821   Discharge Assessment   Assessment Type Discharge Planning Assessment   Confirmed/corrected address and phone number on facesheet? Yes   Assessment information obtained from? Patient   Prior to hospitilization cognitive status: Alert/Oriented   Prior to hospitalization functional status: Independent   Current cognitive status: Alert/Oriented   Current Functional Status: Independent   Lives With sibling(s)   Able to Return to Prior Arrangements yes   Is patient able to care for self after discharge? Yes   Who are your caregiver(s) and their phone number(s)? Alina Arnie (sister) [phone];  Rodrigoaleja Pak (step-son) [phone]   Patient's perception of discharge disposition home or selfcare   Readmission Within the Last 30 Days no previous admission in last 30 days   Patient currently being followed by outpatient case management? No   Patient currently receives any other outside agency services? No   Equipment Currently Used at Home none   Part D Coverage Pt has managed Medicare   Do you have any problems affording any of your prescribed medications? No   Is the patient taking medications as prescribed? yes   Does the patient have transportation home? Yes   Transportation Anticipated car, drives self;family or friend will provide   Dialysis Name and Scheduled days N/A   Discharge Plan A Home   DME Needed Upon Discharge  none   Patient/Family in Agreement with Plan yes

## 2020-02-17 NOTE — NURSING
D/C instructions provided and explained to pt, understanding of D/C instructions verbalized. IV removed, catheter intact. Tolerated well. Telemetry monitor removed and returned to monitor room. VSS. Pt denies complaints. Walked off unit with step son.

## 2020-02-17 NOTE — PLAN OF CARE
Pt resting quietly at this time. Able to make needs known. Cont b/b. Bed low and locked. Call light in reach.

## 2020-02-18 ENCOUNTER — TELEPHONE (OUTPATIENT)
Dept: MEDSURG UNIT | Facility: HOSPITAL | Age: 65
End: 2020-02-18

## 2020-02-18 LAB
CV STRESS BASE HR: 65 BPM
DIASTOLIC BLOOD PRESSURE: 95 MMHG
OHS CV CPX 1 MINUTE RECOVERY HEART RATE: 150 BPM
OHS CV CPX 85 PERCENT MAX PREDICTED HEART RATE MALE: 126
OHS CV CPX MAX PREDICTED HEART RATE: 149
OHS CV CPX PATIENT IS FEMALE: 1
OHS CV CPX PATIENT IS MALE: 0
OHS CV CPX PEAK DIASTOLIC BLOOD PRESSURE: 83 MMHG
OHS CV CPX PEAK HEAR RATE: 210 BPM
OHS CV CPX PEAK RATE PRESSURE PRODUCT: NORMAL
OHS CV CPX PEAK SYSTOLIC BLOOD PRESSURE: 203 MMHG
OHS CV CPX PERCENT MAX PREDICTED HEART RATE ACHIEVED: 141
OHS CV CPX RATE PRESSURE PRODUCT PRESENTING: NORMAL
STRESS ECHO POST EXERCISE DUR MIN: 7 MINUTES
STRESS ECHO POST EXERCISE DUR SEC: 1 SECONDS
STRESS ECHO TARGET HR: 132 BPM
SYSTOLIC BLOOD PRESSURE: 154 MMHG

## 2020-02-18 NOTE — HOSPITAL COURSE
Patient admitted with tachycardia and uncontrolled htn with slightly elevated troponins. No chest pain or ekg changes. She had just had bp med change to decreased toprol.  Cardiology was consulted and stress test negative, bp meds adjusted (see dc orders) and toprol dose resumed to 50mg/day  Alert, 0x3, Nad. Lungs clear cor rrr abd soft, nontender.   Ext no edema

## 2020-02-18 NOTE — DISCHARGE SUMMARY
[facility] Medicine  Discharge Summary      Patient Name: Marga Pak  MRN: [id number]  Admission Date: 2/16/2020  Hospital Length of Stay: 0 days  Discharge Date and Time: 2/17/2020  5:42 PM  Attending Physician: Stacy att. providers found   Discharging Provider: Namita Altamirano MD  Primary Care Provider: Abdoulaye Lopez MD      HPI:   Marga Pak is a 65-year-old female with a past medical history of hypertension and hyperlipidemia who follows with Dr. Pedro.  She presented to the emergency department [facility] with complaints of hypertension, dizziness, palpitations, and heart rate of 120 which she noted at home.  Patient was seen by the cardiology PA on 02/07/2020 with complaints of hypertension, headache, and nausea.  At that time, patient's medications were adjusted to reflect an increase in her lisinopril/hydrochlorothiazide from 20/12.5 mg daily to 40/25 mg daily.  On 02/13/2020, patient notified her doctor's office that her symptoms had continued.  At that time, Norvasc 2.5 mg daily was added to her regimen and per the PAs documentation, patient was to decrease her metoprolol from 50 mg daily to 25 mg daily due to bradycardia.  Patient reports at this time that her metoprolol was discontinued; however, I do not find that documentation per her physician.  Patient states that her last dose of metoprolol was 2 days ago.  She further reports that she has taken metoprolol for 18 years.  While in the emergency department patient was noted to be mildly hypertensive with lab significant for elevated troponin in the absence of chest pain or ischemic EKG changes.  She denies fever, shortness of breath, abdominal pain, or further episodes of palpitations since initially awakening [facility].  Patient will be placed in observation for further medical care.    * No surgery found *      Hospital Course:   Patient admitted with tachycardia and uncontrolled htn with slightly elevated  troponins. No chest pain or ekg changes. She had just had bp med change to decreased toprol.  Cardiology was consulted and stress test negative, bp meds adjusted (see dc orders) and toprol dose resumed to 50mg/day  Alert, 0x3, Nad. Lungs clear cor rrr abd soft, nontender.   Ext no edema        Consults:   Consults (From admission, onward)        Status Ordering Provider     Inpatient consult to Cardiology  Once     Provider:  Sual Mcclelland MD    Completed KINA VARMA new Assessment & Plan notes have been filed under this hospital service since the last note was generated.  Service: Hospital Medicine    Final Active Diagnoses:    Diagnosis Date Noted POA    PRINCIPAL PROBLEM:  Demand ischemia [I24.8] 02/16/2020 Yes    Hypokalemia [E87.6] 02/16/2020 Yes    Hyperlipidemia [E78.5] 05/22/2018 Yes    Essential hypertension [I10] 01/16/2017 Yes      Problems Resolved During this Admission:       Discharged Condition: good    Disposition: Home or Self Care    Follow Up:  Follow-up Information     Abdoulaye Lopez MD.    Specialty:  Family Medicine  Why:  As needed  Contact information:  [address] [place] [address] [phone]                 Patient Instructions:      Diet Adult Regular     Activity as tolerated       Significant Diagnostic Studies:   Results for JASMINE HOLLOWAY (MRN [id number]) as of 2/18/2020 07:31   Ref. Range 2/16/2020 14:12   Troponin I Latest Ref Range: 0.000 - 0.026 ng/mL 0.029 (H)     Impression       1.  Scintigraphically negative for ischemia or infarct.  2. Left ventricular systolic function is normal.      Electronically signed by: Van Desai MD  Date: 02/17/2020  Time: 13:13         Pending Diagnostic Studies:     None         Medications:  Reconciled Home Medications:      Medication List      CHANGE how you take these medications    metoprolol succinate 50 MG 24 hr tablet  Commonly known as:  TOPROL-XL  Take 1 tablet (50 mg total) by mouth  once daily. TAKE 1 TABLET(50 MG) BY MOUTH EVERY DAY  What changed:  how much to take        CONTINUE taking these medications    atorvastatin 20 MG tablet  Commonly known as:  LIPITOR  Take 1 tablet (20 mg total) by mouth once daily.     estrogens (conjugated) 0.9 MG Tab  Commonly known as:  Premarin  TAKE 1 TABLET(0.9 MG) BY MOUTH EVERY DAY     famotidine 20 MG tablet  Commonly known as:  PEPCID  Take 1 tablet (20 mg total) by mouth 2 (two) times daily.     levocetirizine 5 MG tablet  Commonly known as:  XYZAL  TK 1 T PO  QAM PRF SINUS ALLERGY OR DRIP     lisinopril-hydrochlorothiazide 20-12.5 mg per tablet  Commonly known as:  PRINZIDE,ZESTORETIC  Take 2 tablets by mouth once daily.     ondansetron 4 MG Tbdl  Commonly known as:  ZOFRAN-ODT  Take 1 tablet (4 mg total) by mouth every 6 (six) hours as needed (for vomiting or nausea).     promethazine 25 MG suppository  Commonly known as:  PHENERGAN  Place 1 suppository (25 mg total) rectally every 6 (six) hours as needed for Nausea.        STOP taking these medications    amLODIPine 2.5 MG tablet  Commonly known as:  NORVASC            Indwelling Lines/Drains at time of discharge:   Lines/Drains/Airways     None                 Time spent on the discharge of patient: 32 minutes  Patient was seen and examined on the date of discharge and determined to be suitable for discharge.         Namita Altamirano MD  Department of Hospital Medicine  [facility]

## 2020-02-18 NOTE — PLAN OF CARE
02/18/20 1142   Final Note   Assessment Type Final Discharge Note   Anticipated Discharge Disposition Home

## 2020-03-30 NOTE — PROGRESS NOTES
Digital Medicine: Health  Follow-Up    The history is provided by the patient.     Follow Up  Follow-up reason(s): routine education      Routine Education Topics: eating patterns and physical activity        INTERVENTION(S)  recommended diet modifications, recommend physical activity and encouragement/support    PLAN  patient verbalizes understanding and continue monitoring    F/U on medications and how much she is taking now, see how diet is going and if improvement in blood pressure numbers, see if still walking 5 miles every day, ask about alcohol/tobacco.      There are no preventive care reminders to display for this patient.    Last 5 Patient Entered Readings                                      Current 30 Day Average: 147/77     Recent Readings 3/23/2020 3/23/2020 3/19/2020 3/18/2020 3/12/2020    SBP (mmHg) 150 156 152 160 130    DBP (mmHg) 80 84 78 87 70    Pulse 74 74 64 64 68                      Diet Screening       Patient reports that she doesn't eat fatty foods. She really tries to monitor her diet and watches salt intake. She was drinking a lot of gatorade recently and she switched to tea. She didn't realize how much sodium was in Gatorade. That might be why her readings have been higher recently.     Intervention(s): low sodium diet education and reducing sodium intake    Physical Activity Screening   When asked if exercising, patient responded: yes    Patient participates in the following activities: walking and weights    Patient reports that she walks 5 miles every day. She also does weight training.    Medication Adherence Screening   She did not miss a dose this month.  Patient knows purpose of medications.      Patient identified the following reasons for non-compliance: None    Patient reports that she has been only taking 1 tablet of Lisinopril-HCTZ even though her prescription says to take 2 per day. She started only taking 1 tablet around 02/18/2020. She is still taking her metoprolol  as prescribed. This is working well for her right now. She might be running out of her prescription soon but couldn't quite clarify when (patient is a talker). She is also taking serovital vitamins and wondered what I thought about those. Told her I would talk to pharmD to see what recommendations she has.       SDOH

## 2020-03-31 ENCOUNTER — PATIENT OUTREACH (OUTPATIENT)
Dept: OTHER | Facility: OTHER | Age: 65
End: 2020-03-31

## 2020-03-31 NOTE — PROGRESS NOTES
Digital Medicine: Health  Follow-Up    Patient called today concerned about her lisinopril and COVID-19. She reports that relative works at a hospital in [place] and told her that she needs to get off her lisinopril and start on amlodipine. Told her that we have not received enough information about this and that she needs to continue taking her prescription as prescribed. Patient was still very concerned about this. Told her I would relay this information to her pharmD so that she can discuss medication options with her when she is able to reach out.     The history is provided by the patient. No  was used.     Follow Up  Follow-up reason(s): routine education          INTERVENTION(S)  encouragement/support    PLAN  patient verbalizes understanding and continue monitoring      There are no preventive care reminders to display for this patient.    Last 5 Patient Entered Readings                                      Current 30 Day Average: 145/77     Recent Readings 3/30/2020 3/23/2020 3/23/2020 3/19/2020 3/18/2020    SBP (mmHg) 131 150 156 152 160    DBP (mmHg) 72 80 84 78 87    Pulse 65 74 74 64 64                  Screenings    SDOH

## 2020-04-01 ENCOUNTER — PATIENT MESSAGE (OUTPATIENT)
Dept: CARDIOLOGY | Facility: CLINIC | Age: 65
End: 2020-04-01

## 2020-04-01 RX ORDER — LISINOPRIL AND HYDROCHLOROTHIAZIDE 12.5; 2 MG/1; MG/1
2 TABLET ORAL DAILY
Qty: 180 TABLET | Refills: 3 | Status: SHIPPED | OUTPATIENT
Start: 2020-04-01 | End: 2020-04-02

## 2020-04-01 RX ORDER — METOPROLOL SUCCINATE 50 MG/1
50 TABLET, EXTENDED RELEASE ORAL DAILY
Qty: 90 TABLET | Refills: 0
Start: 2020-04-01 | End: 2020-08-10 | Stop reason: SDUPTHER

## 2020-04-01 NOTE — TELEPHONE ENCOUNTER
----- Message from Kayla Ruiz sent at 4/1/2020  1:59 PM CDT -----  Contact: patient  Type: Needs Medical Advice    Who Called:  patient    Pharmacy name and phone #:        Phone # for [facility]  [phone]  Address is :  [facility] , [place] [address]    Best Call Back Number: [phone]    Additional Information: Patient sent a message about changing BP medications due to the one she is on causing issues in regards to COVID-19.  Patient is following up and wanted to have another message sent because she states that her pharmacy closed at 8:00 tonight and she is trying to get the new RX before they close.  She is currently staying at her daughter's house in [place] so the medication would have to be called in to the [facility] listed above.  She also states that she may need a separate fluid pill called in because if a change is made, the medication she was on had the fluid medication in it.

## 2020-04-02 ENCOUNTER — PATIENT OUTREACH (OUTPATIENT)
Dept: OTHER | Facility: OTHER | Age: 65
End: 2020-04-02

## 2020-04-02 DIAGNOSIS — I10 ESSENTIAL HYPERTENSION: Primary | ICD-10-CM

## 2020-04-02 RX ORDER — AMLODIPINE BESYLATE 10 MG/1
10 TABLET ORAL DAILY
Qty: 90 TABLET | Refills: 0 | OUTPATIENT
Start: 2020-04-02 | End: 2021-04-02

## 2020-04-22 ENCOUNTER — PATIENT OUTREACH (OUTPATIENT)
Dept: OTHER | Facility: OTHER | Age: 65
End: 2020-04-22

## 2020-04-22 NOTE — PROGRESS NOTES
Digital Medicine: Health  Follow-Up    Patient is still in [place] living with her daughter.     The history is provided by the patient. No  was used.     Follow Up  Follow-up reason(s): routine education      Routine Education Topics: eating patterns and physical activity        INTERVENTION(S)  recommended diet modifications, recommend physical activity and encouragement/support    PLAN  patient verbalizes understanding and continue monitoring    Unsure why some readings are still elevated, will continue to monitor. F/U on diet and exercise and see if improvement in readings.       There are no preventive care reminders to display for this patient.    Last 5 Patient Entered Readings                                      Current 30 Day Average: 144/78     Recent Readings 4/15/2020 4/14/2020 4/9/2020 4/7/2020 4/3/2020    SBP (mmHg) 156 146 121 143 154    DBP (mmHg) 82 73 70 77 80    Pulse 59 64 69 65 59                      Diet Screening   Patient reports eating or drinking the following: water    The patient drinks 51 ounces of water per day.    She has the following dietary restrictions: low sodium dietShe cooks for self.      Patient reports that she has lost 8 pounds recently. She doesn't watch her diet but has been watching her salt intake. She hasn't been cooking with salt lately. She is working on her water intake. She tries to get 3 bottles of water.     Intervention(s): low sodium diet education, reducing sodium intake and increasing water intake    Physical Activity Screening   When asked if exercising, patient responded: yes    Patient participates in the following activities: walking    Patient reports that she is still walking 5 miles a day. She also does HIIT training every other day. This is still working well for her and enjoys being active.     Medication Adherence Screening   She did not miss a dose this month.  Patient knows purpose of medications.      Patient identified  the following reasons for non-compliance: None    Denies concerns today.     Tobacco and Alcohol Screening       Patient is not eligible for referral to smoking cessation.        SDOH

## 2020-05-13 ENCOUNTER — PATIENT MESSAGE (OUTPATIENT)
Dept: ADMINISTRATIVE | Facility: OTHER | Age: 65
End: 2020-05-13

## 2020-05-28 ENCOUNTER — PATIENT OUTREACH (OUTPATIENT)
Dept: OTHER | Facility: OTHER | Age: 65
End: 2020-05-28

## 2020-05-28 NOTE — PROGRESS NOTES
Digital Medicine: Health  Follow-Up    Average blood pressure today 134/72. BP trending downward. She did have a nose bleed the other day and feels that it was related to her implant with her tooth. Denies more nose bleeds.     She reports that she hasn't been good with her diet this past week. She has been really stressed about the coronavirus and has been snacking on more potato chips. Reviewed need to keep watching sodium intake and try to keep it low even though she is stressed. She is still walking ever day when the weather is good. She hasn't been able to walk lately because it's been raining for the past 5 days.     She is still in [place] with her daughter. She is planning on getting her tooth fixed and then she will be heading back to [place] in a month.     The history is provided by the patient. No  was used.     Follow Up  Follow-up reason(s): routine education      Routine Education Topics: eating patterns and physical activity        INTERVENTION(S)  recommended diet modifications, recommend physical activity and encouragement/support    PLAN  patient verbalizes understanding and continue monitoring    Will f/u in 5 weeks and see how it's been going being back in [place]. Need to see how diet is going and if still walking.       There are no preventive care reminders to display for this patient.    Last 5 Patient Entered Readings                                      Current 30 Day Average: 134/72     Recent Readings 5/27/2020 5/27/2020 5/21/2020 5/18/2020 5/13/2020    SBP (mmHg) 124 134 138 123 142    DBP (mmHg) 67 69 73 67 71    Pulse 61 62 69 62 67                      Diet Screening   Patient reports eating or drinking the following: water and packaged/processed foods    The patient drinks 101 ounces of water per day.    She has the following dietary restrictions: low sodium diet    She still gets a good amount of water throughout the day. She will start working on diet again  and reduce salt intake again.     Intervention(s): low sodium diet education and reducing sodium intake    Physical Activity Screening   When asked if exercising, patient responded: yes    Patient participates in the following activities: walking    Still walking 5 miles a day when the weather permits.       SDOH

## 2020-07-02 ENCOUNTER — PATIENT OUTREACH (OUTPATIENT)
Dept: OTHER | Facility: OTHER | Age: 65
End: 2020-07-02

## 2020-07-02 NOTE — PROGRESS NOTES
Digital Medicine: Health  Follow-Up    Average blood pressure today is 132/72. She has been reducing her sodium intake and her readings have been a lot better.    Her sister was needing her so she had to leave.     She lost pharmD's number that I gave her before and asked for it again. Recommend reaching out to pharmD when she has a moment.     The history is provided by the patient. No  was used.   Follow Up  Follow-up reason(s): routine education      Routine Education Topics: eating patterns        INTERVENTION(S)  recommended diet modifications, recommend physical activity and encouragement/support    PLAN  patient verbalizes understanding and continue monitoring    Will f/u in 6 weeks, sooner if concerns.       There are no preventive care reminders to display for this patient.    Last 5 Patient Entered Readings                                      Current 30 Day Average: 132/72     Recent Readings 6/30/2020 6/25/2020 6/23/2020 6/19/2020 6/17/2020    SBP (mmHg) 117 120 128 127 136    DBP (mmHg) 66 69 70 72 74    Pulse 67 66 65 63 59                      Diet Screening   She has the following dietary restrictions: low sodium diet      SDOH

## 2020-07-28 ENCOUNTER — TELEPHONE (OUTPATIENT)
Dept: GASTROENTEROLOGY | Facility: CLINIC | Age: 65
End: 2020-07-28

## 2020-07-28 NOTE — TELEPHONE ENCOUNTER
----- Message from Meka  sent at 7/28/2020  9:50 AM CDT -----  Regarding: procedure due  Contact: pt  Type: Needs Medical Advice    Who Called:      Best Call Back Number:     Additional Information: Requesting a call back from Nurse, regarding pt would like to schedule a Colon test and has questions ,please call back with advice

## 2020-08-06 NOTE — TELEPHONE ENCOUNTER
----- Message from Klaus ORTIZ Fricandace sent at 7/28/2020 12:37 PM CDT -----  Regarding: Schedule Colonoscopy  Contact: patient  Patient called in and stated she was returning call to office earlier this morning to schedule her colonoscopy.  Call back number [phone]     Called no answer

## 2020-08-07 ENCOUNTER — OFFICE VISIT (OUTPATIENT)
Dept: CARDIOLOGY | Facility: CLINIC | Age: 65
End: 2020-08-07
Payer: MEDICARE

## 2020-08-07 VITALS
HEIGHT: 64 IN | WEIGHT: 145.5 LBS | HEART RATE: 58 BPM | BODY MASS INDEX: 24.84 KG/M2 | SYSTOLIC BLOOD PRESSURE: 144 MMHG | DIASTOLIC BLOOD PRESSURE: 67 MMHG

## 2020-08-07 DIAGNOSIS — I10 ESSENTIAL HYPERTENSION: Primary | ICD-10-CM

## 2020-08-07 DIAGNOSIS — E78.2 MIXED HYPERLIPIDEMIA: ICD-10-CM

## 2020-08-07 DIAGNOSIS — I24.89 DEMAND ISCHEMIA: ICD-10-CM

## 2020-08-07 PROCEDURE — 3008F BODY MASS INDEX DOCD: CPT | Mod: CPTII,S$GLB,, | Performed by: INTERNAL MEDICINE

## 2020-08-07 PROCEDURE — 1101F PR PT FALLS ASSESS DOC 0-1 FALLS W/OUT INJ PAST YR: ICD-10-PCS | Mod: CPTII,S$GLB,, | Performed by: INTERNAL MEDICINE

## 2020-08-07 PROCEDURE — 99999 PR PBB SHADOW E&M-EST. PATIENT-LVL III: ICD-10-PCS | Mod: PBBFAC,,, | Performed by: INTERNAL MEDICINE

## 2020-08-07 PROCEDURE — 99213 OFFICE O/P EST LOW 20 MIN: CPT | Mod: S$GLB,,, | Performed by: INTERNAL MEDICINE

## 2020-08-07 PROCEDURE — 3078F DIAST BP <80 MM HG: CPT | Mod: CPTII,S$GLB,, | Performed by: INTERNAL MEDICINE

## 2020-08-07 PROCEDURE — 3008F PR BODY MASS INDEX (BMI) DOCUMENTED: ICD-10-PCS | Mod: CPTII,S$GLB,, | Performed by: INTERNAL MEDICINE

## 2020-08-07 PROCEDURE — 3077F SYST BP >= 140 MM HG: CPT | Mod: CPTII,S$GLB,, | Performed by: INTERNAL MEDICINE

## 2020-08-07 PROCEDURE — 99999 PR PBB SHADOW E&M-EST. PATIENT-LVL III: CPT | Mod: PBBFAC,,, | Performed by: INTERNAL MEDICINE

## 2020-08-07 PROCEDURE — 99213 PR OFFICE/OUTPT VISIT, EST, LEVL III, 20-29 MIN: ICD-10-PCS | Mod: S$GLB,,, | Performed by: INTERNAL MEDICINE

## 2020-08-07 PROCEDURE — 3078F PR MOST RECENT DIASTOLIC BLOOD PRESSURE < 80 MM HG: ICD-10-PCS | Mod: CPTII,S$GLB,, | Performed by: INTERNAL MEDICINE

## 2020-08-07 PROCEDURE — 1101F PT FALLS ASSESS-DOCD LE1/YR: CPT | Mod: CPTII,S$GLB,, | Performed by: INTERNAL MEDICINE

## 2020-08-07 PROCEDURE — 3077F PR MOST RECENT SYSTOLIC BLOOD PRESSURE >= 140 MM HG: ICD-10-PCS | Mod: CPTII,S$GLB,, | Performed by: INTERNAL MEDICINE

## 2020-08-07 NOTE — PROGRESS NOTES
Subjective:    Patient ID:  Marga Pak is a 65 y.o. female who presents for follow-up of hypertension    HPI  She comes with no complaints, no chest pain, no shortness of breath  FC II    Review of Systems   Constitution: Negative for decreased appetite, malaise/fatigue, weight gain and weight loss.   Cardiovascular: Negative for chest pain, dyspnea on exertion, leg swelling, palpitations and syncope.   Respiratory: Negative for cough and shortness of breath.    Gastrointestinal: Negative.    Neurological: Negative for weakness.   All other systems reviewed and are negative.       Objective:      Physical Exam   Constitutional: She is oriented to person, place, and time. She appears well-developed and well-nourished.   HENT:   Head: Normocephalic.   Eyes: Pupils are equal, round, and reactive to light.   Neck: Normal range of motion. Neck supple. No JVD present. Carotid bruit is not present. No thyromegaly present.   Cardiovascular: Normal rate, regular rhythm, normal heart sounds, intact distal pulses and normal pulses. PMI is not displaced. Exam reveals no gallop.   No murmur heard.  Pulmonary/Chest: Effort normal and breath sounds normal.   Abdominal: Soft. Normal appearance. She exhibits no mass. There is no hepatosplenomegaly. There is no abdominal tenderness.   Musculoskeletal: Normal range of motion.         General: No edema.   Neurological: She is alert and oriented to person, place, and time. She has normal strength and normal reflexes. No sensory deficit.   Skin: Skin is warm and intact.   Psychiatric: She has a normal mood and affect.   Nursing note and vitals reviewed.        Assessment:       1. Essential hypertension    2. Mixed hyperlipidemia    3. Demand ischemia         Plan:     Continue all cardiac medications  Regular exercise program  Weight loss  To be followed by PCP

## 2020-08-13 ENCOUNTER — PATIENT OUTREACH (OUTPATIENT)
Dept: OTHER | Facility: OTHER | Age: 65
End: 2020-08-13

## 2020-08-13 DIAGNOSIS — I10 ESSENTIAL HYPERTENSION: ICD-10-CM

## 2020-08-13 RX ORDER — METOPROLOL SUCCINATE 50 MG/1
50 TABLET, EXTENDED RELEASE ORAL DAILY
Qty: 90 TABLET | Refills: 2
Start: 2020-08-13 | End: 2021-06-18

## 2020-08-13 NOTE — PROGRESS NOTES
Digital Medicine: Health  Follow-Up    Average blood pressure today is 135/76. She reports that her medications were changed. Denies concerns today.     Encouraged her to call pharmD to follow up. She plans to do this soon.     The history is provided by the patient.             Reason for review: Blood pressure not at goal        Topics Covered on Call: physical activity and Diet          Diet-no change to diet    No change to diet.  Patient reports eating or drinking the following: Patient reports that she is still working on her diet. She has been watching her sodium intake and portions. She is wanting to lose some weight right now.       Physical Activity-no change to routine  No change to exercise routine.       Additional physical activity details: Patient reports that she is still going on walks for exercise and is planning on going for bike rides.       Medication Adherence-Medication adherence was assessed.      Substance, Sleep, Stress-Not assessed      Continue current diet/physical activity routine.       Addressed any questions or concerns and patient has my contact information if needed prior to next outreach. Patient verbalizes understanding.          There are no preventive care reminders to display for this patient.    Last 5 Patient Entered Readings                                      Current 30 Day Average: 135/76     Recent Readings 8/10/2020 8/9/2020 8/8/2020 8/6/2020 8/6/2020    SBP (mmHg) 136 113 139 146 143    DBP (mmHg) 75 62 80 73 73    Pulse 72 65 61 62 62

## 2020-08-13 NOTE — TELEPHONE ENCOUNTER
----- Message from Dora Moore sent at 8/13/2020 11:27 AM CDT -----  Regarding: Refill follow up  Contact: Refill  Type:  Patient Returning Call    Who Called:  pt  Does the patient know what this is regarding?:  following up about refill for metoprolol succinate (TOPROL-XL) 50 MG 24 hr tablet. Stated that pharm still has not received script  Best Call Back Number:    Additional Information:  Please follow up. Thank you

## 2020-08-13 NOTE — TELEPHONE ENCOUNTER
----- Message from Homero Wright sent at 8/13/2020  9:39 AM CDT -----  Contact: Jair with [facility]  Type:  Pharmacy Calling to Clarify an RX    Name of Caller:  Jair   Pharmacy Name:  [facility]  Prescription Name:  metoprolol succinate (TOPROL-XL) 50 MG 24 hr tablet  What do they need to clarify?:  not being received via fax  Best Call Back Number:  [phone]  Additional Information:  requesting a verbal to fill for pt

## 2020-08-17 ENCOUNTER — PATIENT OUTREACH (OUTPATIENT)
Dept: OTHER | Facility: OTHER | Age: 65
End: 2020-08-17

## 2020-08-17 NOTE — PROGRESS NOTES
Digital Medicine: Health  Follow-Up    Patient called concerned that amolodipine was not listed in her chart anymore. She was wanting that added back into her chart since she is still taking it. Told her that her provider or pharmD were only able to add this back in. She hasn't reached out to pharmD yet and lost her phone number again. Told her I would talk to pharmD about this.     The history is provided by the patient.               Lifestyle  Plan  There are no preventive care reminders to display for this patient.    Last 5 Patient Entered Readings                                      Current 30 Day Average: 131/73     Recent Readings 8/13/2020 8/10/2020 8/9/2020 8/8/2020 8/6/2020    SBP (mmHg) 119 136 113 139 146    DBP (mmHg) 68 75 62 80 73    Pulse 62 72 65 61 62

## 2020-08-18 NOTE — PROGRESS NOTES
8/18/20 No Answer/Busy - BP avg 131/73 - 5th attempt. Received message that call could not be completed at this time. Will attempt again - received same message. Unable to leave a message.  Last 5 Patient Entered Readings                                      Current 30 Day Average: 131/73     Recent Readings 8/13/2020 8/10/2020 8/9/2020 8/8/2020 8/6/2020    SBP (mmHg) 119 136 113 139 146    DBP (mmHg) 68 75 62 80 73    Pulse 62 72 65 61 62        Hypertension Medications             metoprolol succinate (TOPROL-XL) 50 MG 24 hr tablet Take 1 tablet (50 mg total) by mouth once daily. TAKE 1 TABLET(50 MG) BY MOUTH EVERY DAY        5/15/20 No Answer/Busy - Same as above - doesn't appear that amlodipine was started after lisinopril-HCTZ was discontinued. See message on 4/1/20.    5/15/20 No Answer/Busy - BP avg 142/76 - same outcome as previous calls    4/17/20 No Answer/Busy - BP avg 147/79 - phone rang once then get same message. VM box not set-up yet.    4/2/20 No Answer/Busy - BP avg 147/77 - Phone rang once, then service picked up : cannot leave message vm box not set-up yet

## 2020-08-21 RX ORDER — AMLODIPINE BESYLATE 10 MG/1
10 TABLET ORAL DAILY
Qty: 90 TABLET | Refills: 1 | Status: SHIPPED | OUTPATIENT
Start: 2020-08-21 | End: 2020-09-02 | Stop reason: DRUGHIGH

## 2020-08-21 NOTE — PROGRESS NOTES
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 She reports things are going well but she is out of amlodipine 10 mg daily which she is taking for her BP management along with metoprolol 50 once daily. Since she is out of amlodipine, she has been taking lisinopril from a previous prescription where she still had tablets remaining at home. She reports seeing Dr. Adi Pedro last week and forgetting to ask for amlodipine prescription. She also shares that Dr. Joshi advised her not to let her BP fall below . She reports concerns of swelling in her feet due to her busy schedule at the end of the day and is interested in having a diuretic to relieve swelling. Patient believes she has sleep apnea "because I stop breathing at night if I am sleeping on my back and one of my doctors told me my throat was small." She has not been evaluated and is interested in . She reports drinking more water now and is very active as a . She works in her yard regularly - mowing the grass, planting flowers and painting. She reports following the correct measuring technique at home and device has been recently charged.     The history is provided by the patient.      Review of patient's allergies indicates:   -- Bee sting (allergen ext-venom-honey bee) -- Swelling   -- Iodine and iodide containing products -- Palpitations  Completed Medication Reconciliation  Verified pharmacy information.    HYPERTENSION  Explained hypertension digital medicine goals including BP goal less than or equal to 130/80mmHg, improved convenience of BP management and reduced risk of heart attack, kidney failure, stroke, eye disease, dementia, and death.     Explained non-pharmacologic therapies like low salt diet and physical activity can reduce blood pressure.       Explained that we expect patient to submit several blood " pressure readings per week at random times of the day, but at least 30 minutes after taking blood pressure medications. Instructed patient not to allow anyone else to use their blood pressure monitor and phone as data submitted is directly entered into medical record. Reviewed and confirmed appropriate blood pressure monitoring technique.         Reviewed signs/symptoms of hypertension (headache, changes in vision, chest pain, shortness of breath)   Reviewed signs/symptoms of hypotension (lightheaded, dizziness, weakness)     Patient's BP goal is less than or equal to 130/80. Patients BP average is 129/72 mmHg, which is at goal, per 2017 ACC/AHA Hypertension Guidelines.       Last 5 Patient Entered Readings                                      Current 30 Day Average: 129/72     Recent Readings 8/19/2020 8/13/2020 8/10/2020 8/9/2020 8/8/2020    SBP (mmHg) 110 119 136 113 139    DBP (mmHg) 63 68 75 62 80    Pulse 74 62 72 65 61               Depression Screening  Marga Pak screened negative on the depression screening.     Sleep Apnea Screening  Patient not previously diagnosed with TRACY       Medication Affordability Screening  Patient did not answer the medication affordability questionnaires. Patient is currently not having problems affording medications. - Patient denies issues with affording her prescriptive medications.    Medication Adherence-Medication adherence was assessed.  Patient continue taking medication as prescribed.            ASSESSMENT(S)  Patients BP average is 129/72 mmHg, which is at goal. Patient's BP goal is less than or equal to 130/80 per 2017 ACC/AHA Hypertension Guidelines.    BP readings trending downwards and meeting goal 36% of the time. DBP is well-controlled. SBP is intermittently controlled. Continue to monitor.      Hypertension Plan  Continue current therapy.  Continue current diet/physical activity routine.  Await MD intervention. Patient requesting diuretic PRN for  swelling.   Additional referral made. Ambulatory Referral to Sleep Study per patient request.  Provided patient education. Recommended low sodium diet of less than 1,500 mg sodium per day per AHA. Recommened adequate hydration of at least 64 fluid oz of water daily.   - Patient was advised to discontinue lisinopril to prevent causing hypotension with at goal readings and amlodipine back on board. Patient expressed understanding and agreed to stop lisinopril.  - Patient also counseled on past history of hypokalemia due to request for diuretic prn for swelling. Advised to eat a banana or food item high in potassium when diuretic is taken to prevent causing hypokalemia. Patient expressed understanding and agreed.       Addressed any questions or concerns and patient has my contact information if needed prior to next outreach. Patient verbalizes understanding.      Explained the importance of self-monitoring and medication adherence. Encouraged the patient to communicate with their health  for lifestyle modifications to help improve or maintain a healthy lifestyle.        Sent link to [facility] webpages and my contact information via [employer] for future questions.        Explained to the patient that the Digital Medicine team is not available for emergencies. Advised patient call [facility] ([phone] or [phone]) or [phone] if needed.         There are no preventive care reminders to display for this patient.    Hypertension Medications             amLODIPine (NORVASC) 10 MG tablet Take 1 tablet (10 mg total) by mouth once daily.    metoprolol succinate (TOPROL-XL) 50 MG 24 hr tablet Take 1 tablet (50 mg total) by mouth once daily. TAKE 1 TABLET(50 MG) BY MOUTH EVERY DAY

## 2020-08-25 ENCOUNTER — PATIENT MESSAGE (OUTPATIENT)
Dept: ADMINISTRATIVE | Facility: OTHER | Age: 65
End: 2020-08-25

## 2020-09-02 ENCOUNTER — PATIENT OUTREACH (OUTPATIENT)
Dept: OTHER | Facility: OTHER | Age: 65
End: 2020-09-02

## 2020-09-02 DIAGNOSIS — I10 ESSENTIAL HYPERTENSION: Primary | ICD-10-CM

## 2020-09-02 RX ORDER — AMLODIPINE BESYLATE 5 MG/1
5 TABLET ORAL NIGHTLY
Qty: 90 TABLET | Refills: 1 | Status: SHIPPED | OUTPATIENT
Start: 2020-09-02 | End: 2021-09-02

## 2020-09-02 RX ORDER — FUROSEMIDE 20 MG/1
20 TABLET ORAL DAILY PRN
Qty: 5 TABLET | Refills: 0 | Status: SHIPPED | OUTPATIENT
Start: 2020-09-02 | End: 2020-09-02 | Stop reason: CLARIF

## 2020-09-02 NOTE — PROGRESS NOTES
Digital Medicine: Clinician Follow-Up    Janel Mellisa called for hypertension follow-up: resumed amlodipine 10 mg; BP avg 121/70.    The history is provided by the patient.      Review of patient's allergies indicates:   -- Bee sting (allergen ext-venom-honey bee) -- Swelling   -- Iodine and iodide containing products -- Palpitations  Follow-up reason(s): medication change follow-up.     Hypertension    Readings are trending down due to medication adherence.       Patient did make medication change.    Is patient tolerating med change? no  Reasons:  Reports swelling of legs/ankles/feet    Additional Follow-up details: Patient reports that she resumed amlodipine 10 mg daily as prescribed. She is pleased with blood pressure readings but reports significant swelling of both legs/feet after working. She is requesting a fluid pill to relieve swelling.       Last 5 Patient Entered Readings                                      Current 30 Day Average: 121/70     Recent Readings 9/2/2020 8/27/2020 8/19/2020 8/13/2020 8/10/2020    SBP (mmHg) 96 110 110 119 136    DBP (mmHg) 59 68 63 68 75    Pulse 71 70 74 62 72               Depression Screening  Did not address depression screening.    Sleep Apnea Screening    Did not address sleep apnea screening.     Medication Affordability Screening  Did not address medication affordability screening.     Medication Adherence-Medication adherence was assessed.          ASSESSMENT(S)  Patients BP average is 121/70 mmHg, which is at goal. Patient's BP goal is less than or equal to 130/80 per 2017 ACC/AHA Hypertension Guidelines.    Home BP readings meeting goal 50% of the time. BP trending downwards, intermittently controlled. Decrease amlodipine to 5 mg daily due to complaint of swelling of legs/ankles/feet; also take in the evening or at bedtime. Monitor for resolution. Follow-up in 1-2 weeks.       Hypertension Plan  Medication change. Decrease amlodipine to 5 mg every  evening.  Continue current therapy. Continue metoprolol as prescribed.  Continue current diet/physical activity routine.  Provided patient education. Change dose schedule to take amlodipine in the evening. Wear compression stockings to relieve swelling, elevate feet when sitting.           There are no preventive care reminders to display for this patient.    Hypertension Medications             amLODIPine (NORVASC) 5 MG tablet Take 1 tablet (5 mg total) by mouth every evening.    metoprolol succinate (TOPROL-XL) 50 MG 24 hr tablet Take 1 tablet (50 mg total) by mouth once daily. TAKE 1 TABLET(50 MG) BY MOUTH EVERY DAY

## 2020-09-25 ENCOUNTER — PATIENT OUTREACH (OUTPATIENT)
Dept: OTHER | Facility: OTHER | Age: 65
End: 2020-09-25

## 2020-09-25 NOTE — PROGRESS NOTES
Digital Medicine: Clinician Follow-Up    Called Janel Mellisa for hypertension follow-up: BP avg 114/64.    The history is provided by the patient.      Review of patient's allergies indicates:   -- Bee sting (allergen ext-venom-honey bee) -- Swelling   -- Iodine and iodide containing products -- Palpitations  Follow-up reason(s): medication change follow-up.     Hypertension    Patient readings are stable   Additional Follow-up details: Patient reports doing well with amlodipine 5 mg once daily - swelling has reduced in fingers, legs and feet. She has not taken furosemide 20 mg as it has not been needed. She reports compliance to amlodipine and metoprolol. Patient asks if she can take amlodipine 10 half-tablet due to size of amlodipine 5 mg. Counseled patient that is okay to cut amlodipine 10 mg in half to take 5 mg once daily. Also counseled to reduce dose of furosemide by cutting tablet in half if used for swelling and hypotension occurs. Counseld on how to treat hypotension. Patient confirmed understanding by repeating back.        Last 5 Patient Entered Readings                                      Current 30 Day Average: 114/64     Recent Readings 9/21/2020 9/21/2020 9/21/2020 9/13/2020 9/12/2020    SBP (mmHg) 106 115 131 116 130    DBP (mmHg) 59 65 70 62 70    Pulse 74 70 65 67 67                 Depression Screening  Did not address depression screening.    Sleep Apnea Screening    Did not address sleep apnea screening.     Medication Affordability Screening  Did not address medication affordability screening.     Medication Adherence-Medication adherence was assessed.          ASSESSMENT(S)  Patients BP average is 114/64 mmHg, which is at goal. Patient's BP goal is less than or equal to 130/80 per 2017 ACC/AHA Hypertension Guidelines.    Home BP readings meeting goal 83% of the time. Issues with swelling resolved with dose decrease of amlodipine. No medication changes needed at this time. Continue to  monitor. F/u in 12 weeks.       Hypertension Plan  Continue current therapy.  Continue current diet/physical activity routine.  Provided patient education. Counseled on how to treat hypotension if experienced with furosemide use.       Addressed patient questions and patient has my contact information if needed prior to next outreach. Patient verbalizes understanding.            There are no preventive care reminders to display for this patient.  There are no preventive care reminders to display for this patient.    Hypertension Medications             amLODIPine (NORVASC) 5 MG tablet Take 1 tablet (5 mg total) by mouth every evening.    furosemide (LASIX) 20 MG tablet TAKE 1 TABLET BY MOUTH DAILY AS NEEDED FOR SWELLING OF LEGS, ANKLES AND FEET    metoprolol succinate (TOPROL-XL) 50 MG 24 hr tablet Take 1 tablet (50 mg total) by mouth once daily. TAKE 1 TABLET(50 MG) BY MOUTH EVERY DAY

## 2020-09-28 ENCOUNTER — PATIENT OUTREACH (OUTPATIENT)
Dept: OTHER | Facility: OTHER | Age: 65
End: 2020-09-28

## 2020-09-28 NOTE — PROGRESS NOTES
Digital Medicine: Health  Follow-Up    The history is provided by the patient.             Reason for review: Blood pressure at goal        Topics Covered on Call: physical activity and Diet    Additional Follow-up details: Average blood pressure today is 115/64. Patient reports that she has been doing well and denies concerns today.     She is planning on going back to [place] to be with her daughter until after [place]. She is leaving on November 14th.           Diet-no change to diet    No change to diet.  Patient reports eating or drinking the following: Patient is doing well with diet and continuing to eat healthy. She is still watching sodium intake.       Physical Activity-no change to routine  No change to exercise routine.       Additional physical activity details: Patient has been walking but she got new shoes and she hurt her heel. She is going to take a break from walking for about 2 weeks to try to heal up.       Medication Adherence-Medication Adherence not addressed.      Substance, Sleep, Stress-Not assessed      Continue current diet/physical activity routine.       Addressed patient questions and patient has my contact information if needed prior to next outreach. Patient verbalizes understanding.          There are no preventive care reminders to display for this patient.    Last 5 Patient Entered Readings                                      Current 30 Day Average: 115/64     Recent Readings 9/21/2020 9/21/2020 9/21/2020 9/13/2020 9/12/2020    SBP (mmHg) 106 115 131 116 130    DBP (mmHg) 59 65 70 62 70    Pulse 74 70 65 67 67

## 2020-09-29 ENCOUNTER — TELEPHONE (OUTPATIENT)
Dept: ENDOSCOPY | Facility: HOSPITAL | Age: 65
End: 2020-09-29

## 2020-09-29 NOTE — TELEPHONE ENCOUNTER
Pt has colonoscopy scheduled with Dr. Kwong 10/12/2020.  She left a message here in the [facility].  Please call her at your convenience.  Thank you. NM

## 2020-09-30 DIAGNOSIS — Z01.812 PRE-PROCEDURE LAB EXAM: ICD-10-CM

## 2020-10-09 ENCOUNTER — LAB VISIT (OUTPATIENT)
Dept: FAMILY MEDICINE | Facility: CLINIC | Age: 65
End: 2020-10-09
Payer: MEDICARE

## 2020-10-09 DIAGNOSIS — Z01.812 PRE-PROCEDURE LAB EXAM: ICD-10-CM

## 2020-10-09 PROCEDURE — U0003 INFECTIOUS AGENT DETECTION BY NUCLEIC ACID (DNA OR RNA); SEVERE ACUTE RESPIRATORY SYNDROME CORONAVIRUS 2 (SARS-COV-2) (CORONAVIRUS DISEASE [COVID-19]), AMPLIFIED PROBE TECHNIQUE, MAKING USE OF HIGH THROUGHPUT TECHNOLOGIES AS DESCRIBED BY CMS-2020-01-R: HCPCS

## 2020-10-10 LAB — SARS-COV-2 RNA RESP QL NAA+PROBE: NOT DETECTED

## 2020-10-11 NOTE — H&P
History & Physical - Short Stay  Gastroenterology      SUBJECTIVE:     Procedure: Colonoscopy    Chief Complaint/Indication for Procedure: Screening    History of Present Illness:  Asymptomatic    See last Med/Card encounter:  Office Visit   8/7/2020  [facility]     Bill Pedro MD  Cardiology  Essential hypertension +2 more  Dx       Progress Notes       Subjective:    Patient ID:  Marga Pak is a 65 y.o. female who presents for follow-up of hypertension     HPI  She comes with no complaints, no chest pain, no shortness of breath  FC II     Review of Systems   Constitution: Negative for decreased appetite, malaise/fatigue, weight gain and weight loss.   Cardiovascular: Negative for chest pain, dyspnea on exertion, leg swelling, palpitations and syncope.   Respiratory: Negative for cough and shortness of breath.    Gastrointestinal: Negative.      Assessment:       1. Essential hypertension    2. Mixed hyperlipidemia    3. Demand ischemia       Plan:      Continue all cardiac medications  Regular exercise program  Weight loss  To be followed by PCP               See last colonoscopy 8/13/2014:  Impression:  - One 1 mm polyp in the distal ascending colon.                        Resected and retrieved.                        - Redundant colon.                        - Nodule (inverted stump?) at the appendiceal                        orifice. Biopsied.                        - Internal hemorrhoids.                        - The examination was otherwise normal.                        - The examined portion of the ileum was normal.   Recommendation:      - Discharge patient to home.                        - Await pathology results.                        - If the pathology report reveals adenomatous                        tissue, then repeat the colonoscopy for surveillance                        in 5 years.                        - If the pathology report indicates hyperplastic                         polyp, then repeat colonoscopy for surveillance in                        6-7 years.                        - High fiber diet.                        - Use fiber, for example Citrucel, Fibercon, Konsyl                        or Metamucil.                        - Take a PROBIOTIC, such as a carton of GREEK YOGURT                        (Chobani or Oikos, or Activia or Dannon); or tablets                        of ALIGN or CULTURELLE or RIOS-Q (all                        non-prescription), every day for a month.                        - Continue present medications.                        - ( Bentyl (dicyclomine) 10 mg PO QID 30 min AC).                        - Call the G.I. clinic in 2 weeks for reports (if                        you haven't heard from us sooner) [phone].                        - Return to referring physician, Dr. Sweeney, for                        consideration of laparoscopy.                        - Return to your regular GI office (Dr. Pierce or                        one of his colleagues) as needed, or as per your                        routine.   Robb Kwong MD   8/13/2014     SPECIMEN  1) Appendiceal orifice polyp.  2) Ascending colon polyp.  FINAL PATHOLOGIC DIAGNOSIS  1. APPENDICEAL ORIFICE, POLYP, POLYPECTOMY:  -Nonneoplastic colonic mucosa with prominent lymphoid aggregates.  2. ASCENDING COLON, POLYP, POLYPECTOMY:  -Nonneoplastic colonic mucosa with reactive/regenerative changes.  -No dysplasia.      PTA Medications   Medication Sig    amLODIPine (NORVASC) 5 MG tablet Take 1 tablet (5 mg total) by mouth every evening.    atorvastatin (LIPITOR) 20 MG tablet Take 1 tablet (20 mg total) by mouth once daily.    levocetirizine (XYZAL) 5 MG tablet TK 1 T PO  QAM PRF SINUS ALLERGY OR DRIP    metoprolol succinate (TOPROL-XL) 50 MG 24 hr tablet Take 1 tablet (50 mg total) by mouth once daily. TAKE 1 TABLET(50 MG) BY MOUTH EVERY DAY    estrogens, conjugated,  "(PREMARIN) 0.9 MG Tab TAKE 1 TABLET(0.9 MG) BY MOUTH EVERY DAY (Patient not taking: Reported on 10/9/2020)    famotidine (PEPCID) 20 MG tablet Take 1 tablet (20 mg total) by mouth 2 (two) times daily. (Patient not taking: Reported on 10/9/2020)    furosemide (LASIX) 20 MG tablet TAKE 1 TABLET BY MOUTH DAILY AS NEEDED FOR SWELLING OF LEGS, ANKLES AND FEET    ondansetron (ZOFRAN-ODT) 4 MG TbDL Take 1 tablet (4 mg total) by mouth every 6 (six) hours as needed (for vomiting or nausea). (Patient not taking: Reported on 10/9/2020)    promethazine (PHENERGAN) 25 MG suppository Place 1 suppository (25 mg total) rectally every 6 (six) hours as needed for Nausea.       Review of patient's allergies indicates:   Allergen Reactions    Bee sting [allergen ext-venom-honey bee] Swelling    Iodine and iodide containing products Palpitations        Past Medical History:   Diagnosis Date    Colon torsion     Coronary artery disease     CAD, pt states nild MI    Gastroparesis     GERD (gastroesophageal reflux disease)     Hyperlipidemia     Hypertension     Sciatica      Past Surgical History:   Procedure Laterality Date    ABDOMINAL SURGERY      APPENDECTOMY      COLON SURGERY      due to torsion, clipped adhesions    COLONOSCOPY      ESOPHAGOGASTRODUODENOSCOPY      HYSTERECTOMY      OOPHORECTOMY       Family History   Problem Relation Age of Onset    Breast cancer Sister 58    Ovarian cancer Maternal Grandmother      Social History     Tobacco Use    Smoking status: Never Smoker    Smokeless tobacco: Never Used   Substance Use Topics    Alcohol use: No     Frequency: Monthly or less     Drinks per session: 1 or 2     Binge frequency: Never    Drug use: No         OBJECTIVE:     Vital Signs (Most Recent)  Temp: 97.5 °F (36.4 °C) (10/12/20 0737)  Pulse: (!) 56 (10/12/20 0737)  Resp: 15 (10/12/20 0737)  BP: 134/61 (10/12/20 0737)  SpO2: 98 % (10/12/20 0737)    Physical Exam:  : Ht: 5' 4" (162.6 cm)   Wt: 65.3 " kg (144 lb)    BMI: 24.72 kg/m²                                                         GENERAL:  Comfortable, in no acute distress.                                 HEENT EXAM:  Nonicteric.  No adenopathy.  Oropharynx is clear.               NECK:  Supple.                                                               LUNGS:  Clear.                                                               CARDIAC:  Regular rate and rhythm.  S1, S2.  No murmur.                      ABDOMEN:  Soft, positive bowel sounds, nontender.  No hepatosplenomegaly or masses.  No rebound or guarding.                                             EXTREMITIES:  No edema.     MENTAL STATUS:  Alert and oriented.    ASSESSMENT/PLAN:     Assessment: Colorectal cancer screening    Plan: Colonoscopy    Anesthesia Plan:   MAC / General Anaesthesia    ASA Grade: ASA 2 - Patient with mild systemic disease with no functional limitations    MALLAMPATI SCORE: I (soft palate, uvula, fauces, and tonsillar pillars visible)

## 2020-10-12 ENCOUNTER — ANESTHESIA (OUTPATIENT)
Dept: ENDOSCOPY | Facility: HOSPITAL | Age: 65
End: 2020-10-12
Payer: MEDICARE

## 2020-10-12 ENCOUNTER — HOSPITAL ENCOUNTER (OUTPATIENT)
Facility: HOSPITAL | Age: 65
Discharge: HOME | End: 2020-10-12
Attending: INTERNAL MEDICINE | Admitting: INTERNAL MEDICINE
Payer: MEDICARE

## 2020-10-12 ENCOUNTER — ANESTHESIA EVENT (OUTPATIENT)
Dept: ENDOSCOPY | Facility: HOSPITAL | Age: 65
End: 2020-10-12
Payer: MEDICARE

## 2020-10-12 VITALS
HEIGHT: 64 IN | DIASTOLIC BLOOD PRESSURE: 69 MMHG | RESPIRATION RATE: 12 BRPM | OXYGEN SATURATION: 99 % | HEART RATE: 66 BPM | SYSTOLIC BLOOD PRESSURE: 145 MMHG | WEIGHT: 144 LBS | TEMPERATURE: 97 F | BODY MASS INDEX: 24.59 KG/M2

## 2020-10-12 DIAGNOSIS — Z12.11 COLON CANCER SCREENING: ICD-10-CM

## 2020-10-12 PROCEDURE — 37000009 HC ANESTHESIA EA ADD 15 MINS: Mod: PO | Performed by: INTERNAL MEDICINE

## 2020-10-12 PROCEDURE — D9220A PRA ANESTHESIA: Mod: ANES,,, | Performed by: ANESTHESIOLOGY

## 2020-10-12 PROCEDURE — D9220A PRA ANESTHESIA: ICD-10-PCS | Mod: ANES,,, | Performed by: ANESTHESIOLOGY

## 2020-10-12 PROCEDURE — 63600175 PHARM REV CODE 636 W HCPCS: Mod: PO | Performed by: INTERNAL MEDICINE

## 2020-10-12 PROCEDURE — D9220A PRA ANESTHESIA: Mod: CRNA,,, | Performed by: NURSE ANESTHETIST, CERTIFIED REGISTERED

## 2020-10-12 PROCEDURE — G0105 COLORECTAL SCRN; HI RISK IND: HCPCS | Mod: PO | Performed by: INTERNAL MEDICINE

## 2020-10-12 PROCEDURE — 37000008 HC ANESTHESIA 1ST 15 MINUTES: Mod: PO | Performed by: INTERNAL MEDICINE

## 2020-10-12 PROCEDURE — 63600175 PHARM REV CODE 636 W HCPCS: Mod: PO | Performed by: NURSE ANESTHETIST, CERTIFIED REGISTERED

## 2020-10-12 PROCEDURE — G0105 COLORECTAL SCRN; HI RISK IND: ICD-10-PCS | Mod: ,,, | Performed by: INTERNAL MEDICINE

## 2020-10-12 PROCEDURE — D9220A PRA ANESTHESIA: ICD-10-PCS | Mod: CRNA,,, | Performed by: NURSE ANESTHETIST, CERTIFIED REGISTERED

## 2020-10-12 PROCEDURE — 25000003 PHARM REV CODE 250: Mod: PO | Performed by: NURSE ANESTHETIST, CERTIFIED REGISTERED

## 2020-10-12 PROCEDURE — G0105 COLORECTAL SCRN; HI RISK IND: HCPCS | Mod: ,,, | Performed by: INTERNAL MEDICINE

## 2020-10-12 RX ORDER — PROPOFOL 10 MG/ML
VIAL (ML) INTRAVENOUS CONTINUOUS PRN
Status: DISCONTINUED | OUTPATIENT
Start: 2020-10-12 | End: 2020-10-12

## 2020-10-12 RX ORDER — SODIUM CHLORIDE, SODIUM LACTATE, POTASSIUM CHLORIDE, CALCIUM CHLORIDE 600; 310; 30; 20 MG/100ML; MG/100ML; MG/100ML; MG/100ML
INJECTION, SOLUTION INTRAVENOUS CONTINUOUS
Status: DISCONTINUED | OUTPATIENT
Start: 2020-10-12 | End: 2020-10-12 | Stop reason: HOSPADM

## 2020-10-12 RX ORDER — LIDOCAINE HCL/PF 100 MG/5ML
SYRINGE (ML) INTRAVENOUS
Status: DISCONTINUED | OUTPATIENT
Start: 2020-10-12 | End: 2020-10-12

## 2020-10-12 RX ORDER — SODIUM CHLORIDE 0.9 % (FLUSH) 0.9 %
10 SYRINGE (ML) INJECTION
Status: DISCONTINUED | OUTPATIENT
Start: 2020-10-12 | End: 2020-10-12 | Stop reason: HOSPADM

## 2020-10-12 RX ORDER — PROPOFOL 10 MG/ML
VIAL (ML) INTRAVENOUS
Status: DISCONTINUED | OUTPATIENT
Start: 2020-10-12 | End: 2020-10-12

## 2020-10-12 RX ADMIN — PROPOFOL 100 MG: 10 INJECTION, EMULSION INTRAVENOUS at 08:10

## 2020-10-12 RX ADMIN — PROPOFOL 150 MCG/KG/MIN: 10 INJECTION, EMULSION INTRAVENOUS at 08:10

## 2020-10-12 RX ADMIN — LIDOCAINE HYDROCHLORIDE 100 MG: 20 INJECTION, SOLUTION INTRAVENOUS at 08:10

## 2020-10-12 RX ADMIN — SODIUM CHLORIDE, SODIUM LACTATE, POTASSIUM CHLORIDE, AND CALCIUM CHLORIDE: .6; .31; .03; .02 INJECTION, SOLUTION INTRAVENOUS at 07:10

## 2020-10-12 RX ADMIN — SODIUM CHLORIDE, SODIUM LACTATE, POTASSIUM CHLORIDE, AND CALCIUM CHLORIDE: .6; .31; .03; .02 INJECTION, SOLUTION INTRAVENOUS at 08:10

## 2020-10-12 RX ADMIN — PROPOFOL 20 MG: 10 INJECTION, EMULSION INTRAVENOUS at 08:10

## 2020-10-12 NOTE — [employer] PATIENT INSTRUCTIONS
Discharge Summary/Instructions for after Colonoscopy without   Biopsy/Polypectomy  Marga Pak    Monday, October 12, 2020  Robb Kwong MD  RESTRICTIONS ON ACTIVITY:  - Do not drive a car or operate machinery until the day after the procedure.      - The following day: return to full activity including work.  - For  3 days: No heavy lifting, straining or running.  - Diet: You may eat solid foods, but no gassy foods (i.e. beans, broccoli,   cabbage, etc).  TREATMENT FOR COMMON SIDE EFFECTS:  - Mild abdominal pain and bloating or excessive gas: rest, eat lightly and   use a heating pad.  SYMPTOMS TO WATCH FOR AND REPORT TO YOUR PHYSICIAN:  1. Severe abdominal pain.  2. Fever within 24 hours after a procedure.  3. A large amount of rectal bleeding. (A small amount of blood from the   rectum is not serious, especially if hemorrhoids are present.  3.  Because air was put into your colon during the procedure, expelling   large amounts of air from your rectum is normal.  4.  You may not have a bowel movement for 1-3 days because of the   colonoscopy prep.  This is normal.  5.  Call immediately if you notice any of the following:   Chills and/or fever over 101   Persistent vomiting   Severe abdominal pain, other than gas cramps   Severe chest pain   Black, tarry stools   Any bleeding - exceeding one tablespoon  Your doctor recommends these additional instructions:  Eat a high fiber diet.   Take a fiber supplement, for example Citrucel, Fibercon, Konsyl or   Metamucil.   Take a PROBIOTIC, such as a carton of GREEK YOGURT (Chobani or Oikos,  or   Activia or Dannon);  or tablets of ALIGN or CULTURELLE or RIOS-Q (all   non-prescription), every day for a month.   And repeat this at least 3-4   times a year.  Your physician has recommended a repeat colonoscopy in 8-10 years for   screening purposes.  None  If you have any questions or problems, please call your physician.  EMERGENCY PHONE NUMBER: [phone]  LAB  RESULTS: Call in two (2) weeks for lab results, [phone]  ___________________________________________  Nurse Signature  ___________________________________________  Patient/Designated Responsible Party Signature  Robb Kwong MD  10/12/2020 9:12:01 AM  This report has been verified and signed electronically.  [employer]

## 2020-10-12 NOTE — ANESTHESIA PREPROCEDURE EVALUATION
"                                                                                                             10/12/2020  Marga Pak is a 65 y.o., female.    Anesthesia Evaluation    I have reviewed the Patient Summary Reports.    I have reviewed the Nursing Notes.    I have reviewed the Medications.     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   Pulmonary:  Pulmonary Normal    Renal/:  Renal/ Normal     Hepatic/GI:   GERD, well controlled Gastroparesis   Neurological:   Neuromuscular Disease,    Endocrine:  Endocrine Normal        Physical Exam  General:  Well nourished    Airway/Jaw/Neck:  Airway Findings: Mouth Opening: Normal Tongue: Normal  General Airway Assessment: Adult  Oropharynx Findings:  Mallampati: II  Jaw/Neck Findings:  Neck ROM: Normal ROM     Eyes/Ears/Nose:  Eyes/Ears/Nose Findings:    Dental:  Dental Findings:   Chest/Lungs:  Chest/Lungs Findings: Normal Respiratory Rate     Heart/Vascular:  Heart Findings: Rate: Normal  Rhythm: Regular Rhythm        Mental Status:  Mental Status Findings:  Cooperative, Alert and Oriented         Anesthesia Plan  Type of Anesthesia, risks & benefits discussed:  Anesthesia Type:  general  Patient's Preference:   Intra-op Monitoring Plan: standard ASA monitors  Intra-op Monitoring Plan Comments:   Post Op Pain Control Plan: multimodal analgesia  Post Op Pain Control Plan Comments:   Induction:   IV  Beta Blocker:  Patient is on a Beta-Blocker and has received one dose within the past 24 hours (No further documentation required).       Informed Consent: Patient understands risks and agrees with Anesthesia plan.  Questions answered. Anesthesia consent signed with patient.  ASA Score: 2     Day of Surgery Review " of History & Physical:            Ready For Surgery From Anesthesia Perspective.

## 2020-10-12 NOTE — DISCHARGE INSTRUCTIONS
Recovery After Procedural Sedation (Adult)   You have been given medicine by vein to make you sleep during your surgery. This may have included both a pain medicine and sleeping medicine. Most of the effects have worn off. But you may still have some drowsiness for the next 6 to 8 hours.  Home care  Follow these guidelines when you get home:  · For the next 8 hours, you should be watched by a responsible adult. This person should make sure your condition is not getting worse.  · Don't drink any alcohol for the next 24 hours.  · Don't drive, operate dangerous machinery, or make important business or personal decisions during the next 24 hours.  · To prevent injury or falls, use caution when standing and walking for at least 24 hours after your procedure.  Note: Your healthcare provider may tell you not to take any medicine by mouth for pain or sleep in the next 4 hours. These medicines may react with the medicines you were given in the hospital. This could cause a much stronger response than usual.  Follow-up care  Follow up with your healthcare provider if you are not alert and back to your usual level of activity within 12 hours.  When to seek medical advice  Call your healthcare provider right away if any of these occur:  · Drowsiness gets worse  · Weakness or dizziness gets worse  · Repeated vomiting  · You can't be awakened  · Fever  · New rash  [employer] last reviewed this educational content on 9/1/2019  © 7769-8135 [employer]. [address] [place] [address]. All rights reserved. This information is not intended as a substitute for professional medical care. Always follow your healthcare professional's instructions.        PROBIOTICS:  Now that your colon is so cleaned out, now is a good time for a round of PROBIOTICS.  Eat a container of Greek Yogurt, such as OIKOS or CHOBANI,  Or Activia or Dannon    Greek Yogurt.    Or Take a similar Probiotic product such as Align or Culturelle  or Jayla-Q, every day for a month.                  (The products listed are non-prescription, but you may need to ask the pharmacist for their location.)  Repeat this 3-4 times a year.          High-Fiber Diet  Fiber is in fruits, vegetables, cereals, and grains. Fiber passes through your body undigested. A high-fiber diet helps food move through your intestinal tract. The added bulk is helpful in preventing constipation. In people with diverticulosis, fiber helps clean out the pouches along the colon wall. It also prevents new pouches from forming. A high-fiber diet reduces the risk of colon cancer. It also lowers blood cholesterol and prevents high blood sugar in people with diabetes.    The fiber-rich foods listed below should be part of your diet. If you are not used to high-fiber foods, start with 1 or 2 foods from this list. Every 3 to 4 days add a new one to your diet. Do this until you are eating 4 high-fiber foods per day. This should give you 20 to 35 grams of fiber a day. It is also important to drink a lot of water when you are on this diet. You should have 6 to 8 glasses of water a day. Water makes the fiber swell and increases the benefit.  Foods high in dietary fiber  The following foods are high in dietary fiber:  · Breads. Breads made with 100% whole-wheat flour; arabella, wheat, or rye crackers; whole-grain tortillas, bran muffins.  · Cereals. Whole-grain and bran cereals with bran (shredded wheat, wheat flakes, raisin bran, corn bran); oatmeal, rolled oats, granola, and brown rice.  · Fruits. Fresh fruits and their edible skins (pears, prunes, raisins, berries, apples, and apricots); bananas, citrus fruit, mangoes, pineapple; and prune juice.  · Nuts. Any nuts and seeds.  · Vegetables. Best served raw or lightly cooked. All types, especially: green peas, celery, eggplant, potatoes, spinach, broccoli, [place] sprouts, winter squash, carrots, cauliflower, soybeans, lentils, and fresh and dried beans  of all kinds.  · Other. Popcorn, any spices.  Date Last Reviewed: 8/1/2016  © 7001-3496 [employer]. [address] [place] [address], [place] [address]. All rights reserved. This information is not intended as a substitute for professional medical care. Always follow your healthcare professional's instructions.

## 2020-10-12 NOTE — TRANSFER OF CARE
"Anesthesia Transfer of Care Note    Patient: Marga Pak    Procedure(s) Performed: Procedure(s) (LRB):  COLONOSCOPY (N/A)    Patient location: PACU    Anesthesia Type: general    Transport from OR: Transported from OR on 2-3 L/min O2 by NC with adequate spontaneous ventilation    Post pain: adequate analgesia    Post assessment: no apparent anesthetic complications and tolerated procedure well    Post vital signs: stable    Level of consciousness: sedated    Nausea/Vomiting: no nausea/vomiting    Complications: none    Transfer of care protocol was followed      Last vitals:   Visit Vitals  BP (!) 117/57   Pulse (!) 56   Temp 36.4 °C (97.5 °F) (Skin)   Resp 15   Ht 5' 4" (1.626 m)   Wt 65.3 kg (144 lb)   SpO2 98%   Breastfeeding No   BMI 24.72 kg/m²     "

## 2020-10-12 NOTE — BRIEF OP NOTE
Discharge Note  Short Stay      SUMMARY     Admit Date: 10/12/2020    Attending Physician: Robb Kwong Jr., MD     Discharge Physician: Robb Kwong Jr., MD    Discharge Date: 10/12/2020 9:19 AM    Final Diagnosis: Hx of colonic polyps [Z86.010]  Impression:          - Non-bleeding internal hemorrhoids.                        - Redundant colon.                        - Perianal skin tags found on perianal exam.                        - The examination was otherwise normal.                        - The examined portion of the ileum was normal.                        - No specimens collected.   Recommendation:      - Discharge patient to home.                        - High fiber diet.                        - Use fiber, for example Citrucel, Fibercon, Konsyl                        or Metamucil.                        - Take a PROBIOTIC, such as a carton of GREEK YOGURT                        (Chobani or Oikos, or Activia or Dannon); or tablets                        of ALIGN or CULTURELLE or RIOS-Q (all                        non-prescription), every day for a month.                        - Repeat colonoscopy in 8 years for screening                        purposes.                        - Continue present medications.                        - Patient has a contact number available for                        emergencies. The signs and symptoms of potential                        delayed complications were discussed with the                        patient. Return to normal activities tomorrow.                        Written discharge instructions were provided to the                        patient.                        - Return to normal activities tomorrow.   Robb Kwong MD   10/12/2020     Disposition: HOME OR SELF CARE    Patient Instructions:   Current Discharge Medication List      CONTINUE these medications which have NOT CHANGED    Details   amLODIPine (NORVASC) 5 MG tablet Take 1 tablet  (5 mg total) by mouth every evening.  Qty: 90 tablet, Refills: 1    Comments: Dose decrease due to swelling  Associated Diagnoses: Essential hypertension      atorvastatin (LIPITOR) 20 MG tablet Take 1 tablet (20 mg total) by mouth once daily.  Qty: 90 tablet, Refills: 3      levocetirizine (XYZAL) 5 MG tablet TK 1 T PO  QAM PRF SINUS ALLERGY OR DRIP  Refills: 6      metoprolol succinate (TOPROL-XL) 50 MG 24 hr tablet Take 1 tablet (50 mg total) by mouth once daily. TAKE 1 TABLET(50 MG) BY MOUTH EVERY DAY  Qty: 90 tablet, Refills: 2    Comments: .  Associated Diagnoses: Essential hypertension      estrogens, conjugated, (PREMARIN) 0.9 MG Tab TAKE 1 TABLET(0.9 MG) BY MOUTH EVERY DAY  Qty: 90 tablet, Refills: 3      famotidine (PEPCID) 20 MG tablet Take 1 tablet (20 mg total) by mouth 2 (two) times daily.  Qty: 60 tablet, Refills: 1      furosemide (LASIX) 20 MG tablet TAKE 1 TABLET BY MOUTH DAILY AS NEEDED FOR SWELLING OF LEGS, ANKLES AND FEET  Qty: 5 tablet, Refills: 0    Associated Diagnoses: Essential hypertension      ondansetron (ZOFRAN-ODT) 4 MG TbDL Take 1 tablet (4 mg total) by mouth every 6 (six) hours as needed (for vomiting or nausea).  Qty: 21 tablet, Refills: 0      promethazine (PHENERGAN) 25 MG suppository Place 1 suppository (25 mg total) rectally every 6 (six) hours as needed for Nausea.  Qty: 10 suppository, Refills: 0             Discharge Procedure Orders (must include Diet, Follow-up, Activity)    Follow Up:  Follow up with PCP as per your routine.  Please follow a high fiber diet.  Activity as tolerated.    No driving day of procedure.    PROBIOTICS:  Now that your colon is so cleaned out, now is a good time for a round of PROBIOTICS.  Eat a container of Greek Yogurt, such as OIKOS or CHOBANI,  Or Activia or Dannon    Greek Yogurt.    Or Take a similar Probiotic product such as Align or Culturelle or Jayla-Q, every day for a month.                  (The products listed are non-prescription, but  you may need to ask the pharmacist for their location.)  Repeat this 3-4 times a year.

## 2020-10-12 NOTE — ANESTHESIA POSTPROCEDURE EVALUATION
Anesthesia Post Evaluation    Patient: Marga Pak    Procedure(s) Performed: Procedure(s) (LRB):  COLONOSCOPY (N/A)    Final Anesthesia Type: general    Patient location during evaluation: PACU  Patient participation: Yes- Able to Participate  Level of consciousness: awake and alert and oriented  Post-procedure vital signs: reviewed and stable  Pain management: adequate  Airway patency: patent    PONV status at discharge: No PONV  Anesthetic complications: no      Cardiovascular status: blood pressure returned to baseline and stable  Respiratory status: unassisted and spontaneous ventilation  Hydration status: euvolemic  Follow-up not needed.          Vitals Value Taken Time   /69 10/12/20 0917   Temp 36.1 °C (97 °F) 10/12/20 0854   Pulse 66 10/12/20 0917   Resp 12 10/12/20 0917   SpO2 99 % 10/12/20 0917         Event Time   Out of Recovery 09:26:45         Pain/Sue Score: Sue Score: 10 (10/12/2020  9:17 AM)

## 2020-11-10 ENCOUNTER — NURSE TRIAGE (OUTPATIENT)
Dept: ADMINISTRATIVE | Facility: CLINIC | Age: 65
End: 2020-11-10

## 2020-11-10 ENCOUNTER — HOSPITAL ENCOUNTER (OUTPATIENT)
Facility: HOSPITAL | Age: 65
Discharge: HOME | End: 2020-11-12
Attending: EMERGENCY MEDICINE | Admitting: INTERNAL MEDICINE
Payer: MEDICARE

## 2020-11-10 DIAGNOSIS — R00.2 PALPITATIONS: ICD-10-CM

## 2020-11-10 DIAGNOSIS — E87.6 HYPOKALEMIA: ICD-10-CM

## 2020-11-10 DIAGNOSIS — R00.2 PALPITATION: ICD-10-CM

## 2020-11-10 DIAGNOSIS — I10 ESSENTIAL HYPERTENSION: ICD-10-CM

## 2020-11-10 DIAGNOSIS — E78.2 MIXED HYPERLIPIDEMIA: ICD-10-CM

## 2020-11-10 DIAGNOSIS — R55 NEAR SYNCOPE: ICD-10-CM

## 2020-11-10 DIAGNOSIS — I47.10 PAROXYSMAL SVT (SUPRAVENTRICULAR TACHYCARDIA): ICD-10-CM

## 2020-11-10 DIAGNOSIS — Z95.818 STATUS POST PLACEMENT OF IMPLANTABLE LOOP RECORDER: Primary | ICD-10-CM

## 2020-11-10 DIAGNOSIS — I49.9 ARRHYTHMIA: ICD-10-CM

## 2020-11-10 DIAGNOSIS — R07.9 CHEST PAIN: ICD-10-CM

## 2020-11-10 LAB
ALBUMIN SERPL BCP-MCNC: 4.6 G/DL (ref 3.5–5.2)
ALP SERPL-CCNC: 75 U/L (ref 55–135)
ALT SERPL W/O P-5'-P-CCNC: 15 U/L (ref 10–44)
AMPHET+METHAMPHET UR QL: NEGATIVE
ANION GAP SERPL CALC-SCNC: 11 MMOL/L (ref 8–16)
AST SERPL-CCNC: 21 U/L (ref 10–40)
BARBITURATES UR QL SCN>200 NG/ML: NEGATIVE
BASOPHILS # BLD AUTO: 0.09 K/UL (ref 0–0.2)
BASOPHILS NFR BLD: 1 % (ref 0–1.9)
BENZODIAZ UR QL SCN>200 NG/ML: NEGATIVE
BILIRUB SERPL-MCNC: 0.5 MG/DL (ref 0.1–1)
BILIRUB UR QL STRIP: NEGATIVE
BNP SERPL-MCNC: 87 PG/ML (ref 0–99)
BNP SERPL-MCNC: 87 PG/ML (ref 0–99)
BUN SERPL-MCNC: 24 MG/DL (ref 8–23)
BZE UR QL SCN: NEGATIVE
CALCIUM SERPL-MCNC: 9.7 MG/DL (ref 8.7–10.5)
CANNABINOIDS UR QL SCN: NEGATIVE
CHLORIDE SERPL-SCNC: 101 MMOL/L (ref 95–110)
CLARITY UR: CLEAR
CO2 SERPL-SCNC: 27 MMOL/L (ref 23–29)
COLOR UR: COLORLESS
CREAT SERPL-MCNC: 0.8 MG/DL (ref 0.5–1.4)
CREAT UR-MCNC: 24 MG/DL (ref 15–325)
DIFFERENTIAL METHOD: NORMAL
EOSINOPHIL # BLD AUTO: 0.1 K/UL (ref 0–0.5)
EOSINOPHIL NFR BLD: 1.2 % (ref 0–8)
ERYTHROCYTE [DISTWIDTH] IN BLOOD BY AUTOMATED COUNT: 12.8 % (ref 11.5–14.5)
EST. GFR  (AFRICAN AMERICAN): >60 ML/MIN/1.73 M^2
EST. GFR  (NON AFRICAN AMERICAN): >60 ML/MIN/1.73 M^2
GLUCOSE SERPL-MCNC: 91 MG/DL (ref 70–110)
GLUCOSE UR QL STRIP: NEGATIVE
HCT VFR BLD AUTO: 39.5 % (ref 37–48.5)
HGB BLD-MCNC: 12.9 G/DL (ref 12–16)
HGB UR QL STRIP: NEGATIVE
IMM GRANULOCYTES # BLD AUTO: 0.03 K/UL (ref 0–0.04)
IMM GRANULOCYTES NFR BLD AUTO: 0.3 % (ref 0–0.5)
INR PPP: 1.2
KETONES UR QL STRIP: NEGATIVE
LEUKOCYTE ESTERASE UR QL STRIP: NEGATIVE
LYMPHOCYTES # BLD AUTO: 3.7 K/UL (ref 1–4.8)
LYMPHOCYTES NFR BLD: 40.2 % (ref 18–48)
MAGNESIUM SERPL-MCNC: 2 MG/DL (ref 1.6–2.6)
MCH RBC QN AUTO: 30.4 PG (ref 27–31)
MCHC RBC AUTO-ENTMCNC: 32.7 G/DL (ref 32–36)
MCV RBC AUTO: 93 FL (ref 82–98)
MONOCYTES # BLD AUTO: 0.7 K/UL (ref 0.3–1)
MONOCYTES NFR BLD: 7.6 % (ref 4–15)
NEUTROPHILS # BLD AUTO: 4.6 K/UL (ref 1.8–7.7)
NEUTROPHILS NFR BLD: 49.7 % (ref 38–73)
NITRITE UR QL STRIP: NEGATIVE
NRBC BLD-RTO: 0 /100 WBC
OPIATES UR QL SCN: NEGATIVE
PCP UR QL SCN>25 NG/ML: NEGATIVE
PH UR STRIP: 8 [PH] (ref 5–8)
PLATELET # BLD AUTO: 318 K/UL (ref 150–350)
PMV BLD AUTO: 10.5 FL (ref 9.2–12.9)
POTASSIUM SERPL-SCNC: 3.7 MMOL/L (ref 3.5–5.1)
PROT SERPL-MCNC: 8.2 G/DL (ref 6–8.4)
PROT UR QL STRIP: NEGATIVE
PROTHROMBIN TIME: 14.2 SEC (ref 10.6–14.8)
RBC # BLD AUTO: 4.24 M/UL (ref 4–5.4)
SARS-COV-2 RDRP RESP QL NAA+PROBE: NEGATIVE
SODIUM SERPL-SCNC: 139 MMOL/L (ref 136–145)
SP GR UR STRIP: 1.01 (ref 1–1.03)
TOXICOLOGY INFORMATION: NORMAL
TROPONIN I SERPL DL<=0.01 NG/ML-MCNC: <0.03 NG/ML
TROPONIN I SERPL DL<=0.01 NG/ML-MCNC: <0.03 NG/ML
TSH SERPL DL<=0.005 MIU/L-ACNC: 1.63 UIU/ML (ref 0.34–5.6)
URN SPEC COLLECT METH UR: ABNORMAL
UROBILINOGEN UR STRIP-ACNC: NEGATIVE EU/DL
WBC # BLD AUTO: 9.21 K/UL (ref 3.9–12.7)

## 2020-11-10 PROCEDURE — 80307 DRUG TEST PRSMV CHEM ANLYZR: CPT

## 2020-11-10 PROCEDURE — 81003 URINALYSIS AUTO W/O SCOPE: CPT

## 2020-11-10 PROCEDURE — 80053 COMPREHEN METABOLIC PANEL: CPT

## 2020-11-10 PROCEDURE — 25000003 PHARM REV CODE 250: Performed by: NURSE PRACTITIONER

## 2020-11-10 PROCEDURE — 96374 THER/PROPH/DIAG INJ IV PUSH: CPT

## 2020-11-10 PROCEDURE — 25000003 PHARM REV CODE 250: Performed by: INTERNAL MEDICINE

## 2020-11-10 PROCEDURE — 93010 EKG 12-LEAD: ICD-10-PCS | Mod: ,,, | Performed by: INTERNAL MEDICINE

## 2020-11-10 PROCEDURE — 84484 ASSAY OF TROPONIN QUANT: CPT | Mod: 91

## 2020-11-10 PROCEDURE — 85610 PROTHROMBIN TIME: CPT

## 2020-11-10 PROCEDURE — 93005 ELECTROCARDIOGRAM TRACING: CPT | Mod: 59 | Performed by: INTERNAL MEDICINE

## 2020-11-10 PROCEDURE — 84443 ASSAY THYROID STIM HORMONE: CPT

## 2020-11-10 PROCEDURE — 84484 ASSAY OF TROPONIN QUANT: CPT

## 2020-11-10 PROCEDURE — 83735 ASSAY OF MAGNESIUM: CPT

## 2020-11-10 PROCEDURE — G0378 HOSPITAL OBSERVATION PER HR: HCPCS

## 2020-11-10 PROCEDURE — 85025 COMPLETE CBC W/AUTO DIFF WBC: CPT

## 2020-11-10 PROCEDURE — 96372 THER/PROPH/DIAG INJ SC/IM: CPT | Mod: 59

## 2020-11-10 PROCEDURE — U0002 COVID-19 LAB TEST NON-CDC: HCPCS

## 2020-11-10 PROCEDURE — 63600175 PHARM REV CODE 636 W HCPCS: Performed by: NURSE PRACTITIONER

## 2020-11-10 PROCEDURE — 99285 EMERGENCY DEPT VISIT HI MDM: CPT | Mod: 25,CS

## 2020-11-10 PROCEDURE — 63600175 PHARM REV CODE 636 W HCPCS: Performed by: STUDENT IN AN ORGANIZED HEALTH CARE EDUCATION/TRAINING PROGRAM

## 2020-11-10 PROCEDURE — 36415 COLL VENOUS BLD VENIPUNCTURE: CPT

## 2020-11-10 PROCEDURE — 93010 ELECTROCARDIOGRAM REPORT: CPT | Mod: ,,, | Performed by: INTERNAL MEDICINE

## 2020-11-10 PROCEDURE — 83880 ASSAY OF NATRIURETIC PEPTIDE: CPT

## 2020-11-10 PROCEDURE — 96361 HYDRATE IV INFUSION ADD-ON: CPT

## 2020-11-10 RX ORDER — ACETAMINOPHEN 325 MG/1
650 TABLET ORAL EVERY 8 HOURS PRN
Status: DISCONTINUED | OUTPATIENT
Start: 2020-11-10 | End: 2020-11-12 | Stop reason: HOSPADM

## 2020-11-10 RX ORDER — AMLODIPINE BESYLATE 5 MG/1
5 TABLET ORAL NIGHTLY
Status: DISCONTINUED | OUTPATIENT
Start: 2020-11-11 | End: 2020-11-12 | Stop reason: HOSPADM

## 2020-11-10 RX ORDER — ENOXAPARIN SODIUM 100 MG/ML
40 INJECTION SUBCUTANEOUS EVERY 24 HOURS
Status: DISCONTINUED | OUTPATIENT
Start: 2020-11-10 | End: 2020-11-12 | Stop reason: HOSPADM

## 2020-11-10 RX ORDER — METOPROLOL SUCCINATE 50 MG/1
50 TABLET, EXTENDED RELEASE ORAL DAILY
Status: DISCONTINUED | OUTPATIENT
Start: 2020-11-11 | End: 2020-11-12 | Stop reason: HOSPADM

## 2020-11-10 RX ORDER — TALC
6 POWDER (GRAM) TOPICAL NIGHTLY PRN
Status: DISCONTINUED | OUTPATIENT
Start: 2020-11-10 | End: 2020-11-12 | Stop reason: HOSPADM

## 2020-11-10 RX ORDER — HYDROCODONE BITARTRATE AND ACETAMINOPHEN 5; 325 MG/1; MG/1
1 TABLET ORAL EVERY 8 HOURS PRN
Status: DISCONTINUED | OUTPATIENT
Start: 2020-11-10 | End: 2020-11-12 | Stop reason: HOSPADM

## 2020-11-10 RX ORDER — CHOLECALCIFEROL (VITAMIN D3) 25 MCG
1000 TABLET ORAL DAILY
COMMUNITY
End: 2023-05-16

## 2020-11-10 RX ORDER — ONDANSETRON 2 MG/ML
4 INJECTION INTRAMUSCULAR; INTRAVENOUS EVERY 8 HOURS PRN
Status: DISCONTINUED | OUTPATIENT
Start: 2020-11-10 | End: 2020-11-12 | Stop reason: HOSPADM

## 2020-11-10 RX ORDER — ATORVASTATIN CALCIUM 20 MG/1
20 TABLET, FILM COATED ORAL DAILY
Status: DISCONTINUED | OUTPATIENT
Start: 2020-11-11 | End: 2020-11-12 | Stop reason: HOSPADM

## 2020-11-10 RX ORDER — FAMOTIDINE 20 MG/1
20 TABLET, FILM COATED ORAL DAILY
Status: DISCONTINUED | OUTPATIENT
Start: 2020-11-11 | End: 2020-11-12 | Stop reason: HOSPADM

## 2020-11-10 RX ORDER — CLONIDINE HYDROCHLORIDE 0.1 MG/1
0.1 TABLET ORAL EVERY 8 HOURS PRN
Status: DISCONTINUED | OUTPATIENT
Start: 2020-11-11 | End: 2020-11-12 | Stop reason: HOSPADM

## 2020-11-10 RX ORDER — CETIRIZINE HYDROCHLORIDE 10 MG/1
10 TABLET ORAL DAILY
COMMUNITY

## 2020-11-10 RX ORDER — ASPIRIN 81 MG/1
81 TABLET ORAL DAILY
Status: DISCONTINUED | OUTPATIENT
Start: 2020-11-11 | End: 2020-11-12 | Stop reason: HOSPADM

## 2020-11-10 RX ORDER — SODIUM CHLORIDE 0.9 % (FLUSH) 0.9 %
10 SYRINGE (ML) INJECTION
Status: DISCONTINUED | OUTPATIENT
Start: 2020-11-10 | End: 2020-11-12 | Stop reason: HOSPADM

## 2020-11-10 RX ORDER — AMOXICILLIN 500 MG
1 CAPSULE ORAL DAILY
COMMUNITY
End: 2023-05-16

## 2020-11-10 RX ADMIN — CLONIDINE HYDROCHLORIDE 0.1 MG: 0.1 TABLET ORAL at 11:11

## 2020-11-10 RX ADMIN — SODIUM CHLORIDE, SODIUM LACTATE, POTASSIUM CHLORIDE, AND CALCIUM CHLORIDE 1000 ML: .6; .31; .03; .02 INJECTION, SOLUTION INTRAVENOUS at 04:11

## 2020-11-10 RX ADMIN — ONDANSETRON 4 MG: 2 INJECTION INTRAMUSCULAR; INTRAVENOUS at 11:11

## 2020-11-10 RX ADMIN — MELATONIN 6 MG: at 11:11

## 2020-11-10 RX ADMIN — SODIUM CHLORIDE, SODIUM LACTATE, POTASSIUM CHLORIDE, AND CALCIUM CHLORIDE 500 ML: .6; .31; .03; .02 INJECTION, SOLUTION INTRAVENOUS at 06:11

## 2020-11-10 RX ADMIN — ENOXAPARIN SODIUM 40 MG: 40 INJECTION SUBCUTANEOUS at 11:11

## 2020-11-10 NOTE — FIRST PROVIDER EVALUATION
"Medical screening exam completed.  I have conducted a focused provider triage encounter, findings are as follows:    Brief history of present illness:  palpitation with dizziness   Onset about 2 hours ago      Vitals:    11/10/20 1434   BP: (!) 184/90   BP Location: Left arm   Patient Position: Sitting   Pulse: 87   Resp: 18   Temp: 98.6 °F (37 °C)   TempSrc: Oral   Weight: 63.5 kg (140 lb)   Height: 5' 5" (1.651 m)       Pertinent physical exam:  HRR  Lungs CTA  Pt appears in NAD     Brief workup plan:  cardiac work up    Preliminary workup initiated; this workup will be continued and followed by the physician or advanced practice provider that is assigned to the patient when roomed.  "

## 2020-11-10 NOTE — ED TRIAGE NOTES
"Present to the ER with c/o " I was moving stuff around my truck, trying to get spots" " I almost passed out while I was driving I pulled to the side" reports she had just finished putting items in the car, reports going to [employer], [employer], [employer] and become dizzy, reported episode of dizziness," like I was going to pass out" and pulled over to side of road, symptoms resolved and drove home, reported palpations off and on frequently while at home, " fluttering, I had it before"  Symptoms since approx 10:30, pt states " it's still going on" reports symptoms less severe in nature   "

## 2020-11-10 NOTE — ED PROVIDER NOTES
Encounter Date: 11/10/2020       History     Chief Complaint   Patient presents with    Palpitations     HPI   65-year-old female with past medical history hypertension, mitral valve prolapse on metoprolol and amlodipine.  Presents emergency department with palpitations and lightheadedness which occurred today at 10:30 a.m..  States she felt like she was going to pass out had to rest in a chair was having some improvement of symptoms but still not back to her normal so she came to get evaluated.  Endorses decreased p.o. intake.  Denies URI symptoms, cough, headache, chest pain, nausea, vomiting, abdominal pain.   Review of patient's allergies indicates:   Allergen Reactions    Bee sting [allergen ext-venom-honey bee] Swelling    Iodine and iodide containing products Palpitations     Past Medical History:   Diagnosis Date    Colon torsion     Coronary artery disease     CAD, pt states nild MI    Gastroparesis     GERD (gastroesophageal reflux disease)     Hyperlipidemia     Hypertension     Sciatica      Past Surgical History:   Procedure Laterality Date    ABDOMINAL SURGERY      APPENDECTOMY      COLON SURGERY      due to torsion, clipped adhesions    COLONOSCOPY  08/13/2014    JUNIOR.   One 1 mm polyp in the distal ascending colon.  Nonneoplastic colonic mucosa with reactive/regenerative changes.  Nodule (inverted stump?) at the appendiceal orifice.  Nonneoplastic colonic mucosa with prominent lymphoid aggregates.    COLONOSCOPY N/A 10/12/2020    Procedure: COLONOSCOPY;  Surgeon: Robb Kwong Jr., MD;  [facility];  Service: Endoscopy;  Laterality: N/A;    ESOPHAGOGASTRODUODENOSCOPY      HYSTERECTOMY      OOPHORECTOMY       Family History   Problem Relation Age of Onset    Breast cancer Sister 58    Ovarian cancer Maternal Grandmother      Social History     Tobacco Use    Smoking status: Never Smoker    Smokeless tobacco: Never Used   Substance Use Topics    Alcohol use: No      Frequency: Monthly or less     Drinks per session: 1 or 2     Binge frequency: Never    Drug use: No     Review of Systems   Constitutional: Negative for chills and fever.   HENT: Negative for congestion, rhinorrhea and sore throat.    Eyes: Negative for discharge and redness.   Respiratory: Negative for cough and shortness of breath.    Cardiovascular: Positive for palpitations. Negative for chest pain and leg swelling.   Gastrointestinal: Negative for abdominal pain, nausea and vomiting.   Genitourinary: Negative for dysuria and hematuria.   Musculoskeletal: Negative for back pain and neck pain.   Skin: Negative for pallor and rash.   Neurological: Positive for light-headedness. Negative for weakness.   Hematological: Does not bruise/bleed easily.       Physical Exam     Initial Vitals   BP Pulse Resp Temp SpO2   11/10/20 1434 11/10/20 1434 11/10/20 1434 11/10/20 1434 11/10/20 1525   (!) 184/90 87 18 98.6 °F (37 °C) 98 %      MAP       --                Physical Exam    Constitutional: She appears well-nourished. She is not diaphoretic.   HENT:   Head: Normocephalic and atraumatic.   Nose: Nose normal.   Mouth/Throat: Oropharynx is clear and moist.   Eyes: EOM are normal. Pupils are equal, round, and reactive to light. No scleral icterus.   Neck: Normal range of motion. Neck supple. No JVD present.   Cardiovascular: Normal rate, regular rhythm and normal heart sounds.   No murmur heard.  No murmurs   Pulmonary/Chest: Breath sounds normal. No respiratory distress. She has no wheezes. She has no rhonchi.   Lungs CTAB.    Abdominal: Soft. Bowel sounds are normal. She exhibits no distension. There is no abdominal tenderness.   Musculoskeletal: Normal range of motion. No edema.   Neurological: She is alert and oriented to person, place, and time. She has normal strength. No cranial nerve deficit. GCS score is 15. GCS eye subscore is 4. GCS verbal subscore is 5. GCS motor subscore is 6.   AAOx3, No Receptive or  Expressive Aphasia, Answering Questions Appropriately, Recent & Remote Memory Intact, No Visual or Tactile Agnosia to B/L UE, CN/PN Intact, Strength 5/5, Sens Symmetrical to UE & LE, No Ataxia, and WNL FTN and HTS.      Skin: Skin is warm. No erythema. No pallor.         ED Course   Procedures  Labs Reviewed   COMPREHENSIVE METABOLIC PANEL - Abnormal; Notable for the following components:       Result Value    BUN 24 (*)     All other components within normal limits   URINALYSIS, REFLEX TO URINE CULTURE - Abnormal; Notable for the following components:    Color, UA Colorless (*)     All other components within normal limits    Narrative:     Specimen Source->Urine   CBC W/ AUTO DIFFERENTIAL   B-TYPE NATRIURETIC PEPTIDE   TROPONIN I   PROTIME-INR   B-TYPE NATRIURETIC PEPTIDE   TSH   MAGNESIUM   DRUG SCREEN PANEL, URINE EMERGENCY    Narrative:     Specimen Source->Urine   MAGNESIUM   TSH   TSH   SARS-COV-2 RNA AMPLIFICATION, QUAL        ECG Results          EKG 12-lead (In process)  Result time 11/10/20 14:47:04    In process by Interface, Lab In [facility] (11/10/20 14:47:04)                 Narrative:    Test Reason : R07.9,    Vent. Rate : 088 BPM     Atrial Rate : 088 BPM     P-R Int : 182 ms          QRS Dur : 076 ms      QT Int : 362 ms       P-R-T Axes : 060 -24 052 degrees     QTc Int : 438 ms    Normal sinus rhythm  Septal infarct (cited on or before 16-FEB-2020)  Abnormal ECG  When compared with ECG of 16-FEB-2020 02:27,  No significant change was found    Referred By: [email or web address]   SELF           Confirmed By:                             Imaging Results          X-Ray Chest PA And Lateral (Final result)  Result time 11/10/20 14:56:27    Final result by Gasper Cordon MD (11/10/20 14:56:27)                 Narrative:    Reason: Chest Pain    FINDINGS:    PA and lateral chest chest x-ray February 16, 2020 show normal  cardiomediastinal silhouette.  Lungs are clear. Pulmonary vasculature is normal. No acute  osseous  abnormality.    IMPRESSION:    No acute cardiopulmonary abnormality.    Electronically Signed by Gasper Cordon on 11/10/2020 2:58 PM                                            Attending Attestation:   Physician Attestation Statement for Resident:  As the supervising MD   Physician Attestation Statement: I have personally seen and examined this patient.   I agree with the above history. -: 65-year-old female presents complaining of palpitations and feeling lightheaded.   As the supervising MD I agree with the above PE.   -: Vital signs are stable.  No ectopy or arrhythmias noted.  Lungs clear to auscultation bilaterally.   As the supervising MD I agree with the above treatment, course, plan, and disposition.                       MDM:  65-year-old female with past medical history hypertension, mitral valve prolapse on metoprolol and amlodipine.  Presents emergency department with palpitations and lightheadedness which occurred today at 10:30 a.m..  States she felt like she was going to pass out had to rest in a chair was having some improvement of symptoms but still not back to her normal so she came to get evaluated.  Endorses decreased p.o. intake.  Denies URI symptoms, cough, headache, chest pain, nausea, vomiting, abdominal pain.  Vital signs stable.  Lungs clear auscultation bilaterally, no murmurs.  Neuro exam unremarkable.  Labs unremarkable.  Chest x-ray showed no acute cardiopulmonary abnormality.  ECG negative.  IV fluids given.  Will reassess.    Update:  Patient continued to have palpitations when she got up to go to the bathroom.  Will consult to Medicine for further observation and treatment.        Clinical Impression:       ICD-10-CM ICD-9-CM   1. Chest pain  R07.9 786.50                          ED Disposition Condition    Admit                             Breanna Blanco MD  Resident  11/10/20 1941       Osiris Mederos MD  11/10/20 1781

## 2020-11-11 LAB
ALBUMIN SERPL BCP-MCNC: 3.9 G/DL (ref 3.5–5.2)
ALP SERPL-CCNC: 59 U/L (ref 55–135)
ALT SERPL W/O P-5'-P-CCNC: 13 U/L (ref 10–44)
ANION GAP SERPL CALC-SCNC: 10 MMOL/L (ref 8–16)
AST SERPL-CCNC: 19 U/L (ref 10–40)
BASOPHILS # BLD AUTO: 0.07 K/UL (ref 0–0.2)
BASOPHILS NFR BLD: 0.9 % (ref 0–1.9)
BILIRUB SERPL-MCNC: 0.9 MG/DL (ref 0.1–1)
BUN SERPL-MCNC: 17 MG/DL (ref 8–23)
CALCIUM SERPL-MCNC: 9.2 MG/DL (ref 8.7–10.5)
CHLORIDE SERPL-SCNC: 101 MMOL/L (ref 95–110)
CO2 SERPL-SCNC: 28 MMOL/L (ref 23–29)
CREAT SERPL-MCNC: 0.7 MG/DL (ref 0.5–1.4)
DIFFERENTIAL METHOD: ABNORMAL
EOSINOPHIL # BLD AUTO: 0.1 K/UL (ref 0–0.5)
EOSINOPHIL NFR BLD: 1.3 % (ref 0–8)
ERYTHROCYTE [DISTWIDTH] IN BLOOD BY AUTOMATED COUNT: 12.5 % (ref 11.5–14.5)
EST. GFR  (AFRICAN AMERICAN): >60 ML/MIN/1.73 M^2
EST. GFR  (NON AFRICAN AMERICAN): >60 ML/MIN/1.73 M^2
GLUCOSE SERPL-MCNC: 98 MG/DL (ref 70–110)
HCT VFR BLD AUTO: 37 % (ref 37–48.5)
HGB BLD-MCNC: 12.3 G/DL (ref 12–16)
IMM GRANULOCYTES # BLD AUTO: 0.02 K/UL (ref 0–0.04)
IMM GRANULOCYTES NFR BLD AUTO: 0.3 % (ref 0–0.5)
LYMPHOCYTES # BLD AUTO: 3.5 K/UL (ref 1–4.8)
LYMPHOCYTES NFR BLD: 46 % (ref 18–48)
MCH RBC QN AUTO: 31.1 PG (ref 27–31)
MCHC RBC AUTO-ENTMCNC: 33.2 G/DL (ref 32–36)
MCV RBC AUTO: 94 FL (ref 82–98)
MONOCYTES # BLD AUTO: 0.6 K/UL (ref 0.3–1)
MONOCYTES NFR BLD: 7.8 % (ref 4–15)
NEUTROPHILS # BLD AUTO: 3.4 K/UL (ref 1.8–7.7)
NEUTROPHILS NFR BLD: 43.7 % (ref 38–73)
NRBC BLD-RTO: 0 /100 WBC
PLATELET # BLD AUTO: 268 K/UL (ref 150–350)
PMV BLD AUTO: 10.3 FL (ref 9.2–12.9)
POTASSIUM SERPL-SCNC: 3.6 MMOL/L (ref 3.5–5.1)
PROT SERPL-MCNC: 6.8 G/DL (ref 6–8.4)
RBC # BLD AUTO: 3.95 M/UL (ref 4–5.4)
SODIUM SERPL-SCNC: 139 MMOL/L (ref 136–145)
TROPONIN I SERPL DL<=0.01 NG/ML-MCNC: <0.03 NG/ML
WBC # BLD AUTO: 7.67 K/UL (ref 3.9–12.7)

## 2020-11-11 PROCEDURE — 80053 COMPREHEN METABOLIC PANEL: CPT

## 2020-11-11 PROCEDURE — 84484 ASSAY OF TROPONIN QUANT: CPT

## 2020-11-11 PROCEDURE — 99214 OFFICE O/P EST MOD 30 MIN: CPT | Mod: ,,, | Performed by: INTERNAL MEDICINE

## 2020-11-11 PROCEDURE — 36415 COLL VENOUS BLD VENIPUNCTURE: CPT

## 2020-11-11 PROCEDURE — 63600175 PHARM REV CODE 636 W HCPCS: Performed by: NURSE PRACTITIONER

## 2020-11-11 PROCEDURE — 99214 PR OFFICE/OUTPT VISIT, EST, LEVL IV, 30-39 MIN: ICD-10-PCS | Mod: ,,, | Performed by: INTERNAL MEDICINE

## 2020-11-11 PROCEDURE — 85025 COMPLETE CBC W/AUTO DIFF WBC: CPT

## 2020-11-11 PROCEDURE — 25000003 PHARM REV CODE 250: Performed by: NURSE PRACTITIONER

## 2020-11-11 PROCEDURE — G0378 HOSPITAL OBSERVATION PER HR: HCPCS

## 2020-11-11 PROCEDURE — 96372 THER/PROPH/DIAG INJ SC/IM: CPT | Mod: 59

## 2020-11-11 RX ADMIN — MELATONIN 6 MG: at 10:11

## 2020-11-11 RX ADMIN — AMLODIPINE BESYLATE 5 MG: 5 TABLET ORAL at 10:11

## 2020-11-11 RX ADMIN — ENOXAPARIN SODIUM 40 MG: 40 INJECTION SUBCUTANEOUS at 05:11

## 2020-11-11 RX ADMIN — ATORVASTATIN CALCIUM 20 MG: 20 TABLET, FILM COATED ORAL at 10:11

## 2020-11-11 NOTE — NURSING
Report received from [facility]. Pt transferred via wheelchair by ED tech on tele monitoring. Pt received in stable condition. Oriented to room and POC reviewed.

## 2020-11-11 NOTE — PLAN OF CARE
Medicare Outpatient Observation Notice was signed, explained and given to patient/caregiver on 11/11/2020 at 10:06am     answered all questions

## 2020-11-11 NOTE — CONSULTS
[facility]  Department of Cardiology  Consult Note      PATIENT NAME: Marga Pak  MRN: [id number]  TODAY'S DATE: 11/11/2020  ADMIT DATE: 11/10/2020                          CONSULT REQUESTED BY: Zaki Figueroa MD    SUBJECTIVE     PRINCIPAL PROBLEM: Near syncope  65-year-old female with past medical history hypertension, mitral valve prolapse on metoprolol and amlodipine.  Presents emergency department with palpitations and lightheadedness which occurred today at 10:30 a.m..  States she felt like she was going to pass out had to rest in a chair was having some improvement of symptoms but still not back to her normal so she came to get evaluated.  Endorses decreased p.o. intake.  Denies URI symptoms, cough, headache, chest pain, nausea, vomiting, abdominal pain.    REASON FOR CONSULT:  Palpitations      HPI:  Patient is a 65-year-old lady with known history of hypertension mitral valve prolapse had been on metoprolol and amlodipine presented with complaints of having sudden onset of palpitations that happened on the day of admission.  Patient stated that while she was driving home she felt her heart racing to a point where it did not go way spontaneously.  And she started to feel lightheaded and dizzy she in fact had to pull into a side [place] and she waited for a while and she which Ali reached home and at that time she call the sister who told her that she needs to go to the emergency room.  Patient was still contemplating at which time she had another episode with she felt her heart racing and felt dizzy and lightheaded and she felt she could pass out.  At that time she decided to come to the emergency room.  Patient stated that she had intermittent palpitations ever since she was young girl she remembers having transient palpitations that would come and go and as she got older some of those palpitations improved but lately they have been occurring more frequently   Patient denies any chest pain or  tightness or heaviness denies any darien loss of consciousness.        Review of patient's allergies indicates:   Allergen Reactions    Bee sting [allergen ext-venom-honey bee] Swelling    Iodine and iodide containing products Palpitations       Past Medical History:   Diagnosis Date    Colon torsion     Coronary artery disease     CAD, pt states nild MI    Gastroparesis     GERD (gastroesophageal reflux disease)     Hyperlipidemia     Hypertension     Sciatica      Past Surgical History:   Procedure Laterality Date    ABDOMINAL SURGERY      APPENDECTOMY      COLON SURGERY      due to torsion, clipped adhesions    COLONOSCOPY  08/13/2014    JUNIOR.   One 1 mm polyp in the distal ascending colon.  Nonneoplastic colonic mucosa with reactive/regenerative changes.  Nodule (inverted stump?) at the appendiceal orifice.  Nonneoplastic colonic mucosa with prominent lymphoid aggregates.    COLONOSCOPY N/A 10/12/2020    Procedure: COLONOSCOPY;  Surgeon: Robb Kwong Jr., MD;  Location: [facility];  Service: Endoscopy;  Laterality: N/A;    ESOPHAGOGASTRODUODENOSCOPY      HYSTERECTOMY      OOPHORECTOMY       Social History     Tobacco Use    Smoking status: Never Smoker    Smokeless tobacco: Never Used   Substance Use Topics    Alcohol use: No     Frequency: Monthly or less     Drinks per session: 1 or 2     Binge frequency: Never    Drug use: No        REVIEW OF SYSTEMS  CONSTITUTIONAL: Negative for chills, fatigue and fever.   EYES: No double vision, No blurred vision  NEURO: No headaches, dizziness with palpitations.  RESPIRATORY: Negative for cough, shortness of breath and wheezing.    CARDIOVASCULAR: Negative for chest pain. Negative for leg swelling.  Intermittent palpitations.  GI: Negative for abdominal pain, No melena, diarrhea, nausea and vomiting.   : Negative for dysuria and frequency, Negative for hematuria  SKIN: Negative for bruising, Negative for edema or discoloration noted.   ENDOCRINE:  Negative for polyphagia, Negative for heat intolerance, Negative for cold intolerance  PSYCHIATRIC: Negative for depression, Negative for anxiety, Negative for memory loss  MUSCULOSKELETAL: Negative for neck pain, Negative for muscle weakness, Negative for back pain     OBJECTIVE     VITAL SIGNS (Most Recent)  Temp: 97.5 °F (36.4 °C) (11/11/20 0705)  Pulse: 63 (11/11/20 0705)  Resp: 14 (11/11/20 0705)  BP: 121/67 (11/11/20 0705)  SpO2: 98 % (11/11/20 0705)    VENTILATION STATUS  Resp: 14 (11/11/20 0705)  SpO2: 98 % (11/11/20 0705)       I & O (Last 24H):    Intake/Output Summary (Last 24 hours) at 11/11/2020 1107  Last data filed at 11/10/2020 2300  Gross per 24 hour   Intake 1900 ml   Output 350 ml   Net 1550 ml       WEIGHTS  Wt Readings from Last 1 Encounters:   11/10/20 2137 67.5 kg (148 lb 13 oz)   11/10/20 1434 63.5 kg (140 lb)       PHYSICAL EXAM  GENERAL: well built, well nourished, well-developed in no apparent distress alert and oriented.   HEENT: Normocephalic. Pupils normal and conjunctivae normal.   NECK: No JVD. No bruit..   THYROID: Thyroid not enlarged. No nodules present..   CARDIAC: Regular rate and rhythm. S1 is normal.S2 is normal.  Soft holosystolic murmur audible  CHEST ANATOMY: normal.   LUNGS: Clear to auscultation. No wheezing or rhonchi..   ABDOMEN: Soft, bowel sounds are audible.    URINARY: No butler catheter   EXTREMITIES: No cyanosis, clubbing or edema noted at this time., no calf tenderness bilaterally.   PERIPHERAL VASCULAR SYSTEM: Good palpable distal pulses.   CENTRAL NERVOUS SYSTEM: No focal motor or sensory deficits noted.   SKIN: Skin without lesions, moist, well perfused.   MUSCLE STRENGTH & TONE: No noteable weakness, atrophy or abnormal movement.     HOME MEDICATIONS:  No current facility-administered medications on file prior to encounter.      Current Outpatient Medications on File Prior to Encounter   Medication Sig Dispense Refill    amLODIPine (NORVASC) 5 MG tablet Take 1  tablet (5 mg total) by mouth every evening. 90 tablet 1    aspirin (ASPIR-81 ORAL) Take 1 tablet by mouth once daily.      atorvastatin (LIPITOR) 20 MG tablet Take 1 tablet (20 mg total) by mouth once daily. 90 tablet 3    cetirizine (ZYRTEC) 10 MG tablet Take 10 mg by mouth once daily.      metoprolol succinate (TOPROL-XL) 50 MG 24 hr tablet Take 1 tablet (50 mg total) by mouth once daily. TAKE 1 TABLET(50 MG) BY MOUTH EVERY DAY 90 tablet 2    omega-3 fatty acids/fish oil (FISH OIL-OMEGA-3 FATTY ACIDS) 300-1,000 mg capsule Take 1 capsule by mouth once daily. otc      vitamin D (VITAMIN D3) 1000 units Tab Take 1,000 Units by mouth once daily. OTC      estrogens, conjugated, (PREMARIN) 0.9 MG Tab TAKE 1 TABLET(0.9 MG) BY MOUTH EVERY DAY (Patient not taking: Reported on 10/9/2020) 90 tablet 3    famotidine (PEPCID) 20 MG tablet Take 1 tablet (20 mg total) by mouth 2 (two) times daily. (Patient not taking: Reported on 10/9/2020) 60 tablet 1    furosemide (LASIX) 20 MG tablet TAKE 1 TABLET BY MOUTH DAILY AS NEEDED FOR SWELLING OF LEGS, ANKLES AND FEET 5 tablet 0    levocetirizine (XYZAL) 5 MG tablet TK 1 T PO  QAM PRF SINUS ALLERGY OR DRIP  6    ondansetron (ZOFRAN-ODT) 4 MG TbDL Take 1 tablet (4 mg total) by mouth every 6 (six) hours as needed (for vomiting or nausea). (Patient not taking: Reported on 10/9/2020) 21 tablet 0    promethazine (PHENERGAN) 25 MG suppository Place 1 suppository (25 mg total) rectally every 6 (six) hours as needed for Nausea. 10 suppository 0       SCHEDULED MEDS:   amLODIPine  5 mg Oral QHS    aspirin  81 mg Oral Daily    atorvastatin  20 mg Oral Daily    enoxaparin  40 mg Subcutaneous Q24H    famotidine  20 mg Oral Daily    metoprolol succinate  50 mg Oral Daily       CONTINUOUS INFUSIONS:    PRN MEDS:acetaminophen, cloNIDine, HYDROcodone-acetaminophen, melatonin, ondansetron, sodium chloride 0.9%    LABS AND DIAGNOSTICS     CBC LAST 3 DAYS  Recent Labs   Lab  11/10/20  1510 11/11/20  0311   WBC 9.21 7.67   RBC 4.24 3.95*   HGB 12.9 12.3   HCT 39.5 37.0   MCV 93 94   MCH 30.4 31.1*   MCHC 32.7 33.2   RDW 12.8 12.5    268   MPV 10.5 10.3   GRAN 49.7  4.6 43.7  3.4   LYMPH 40.2  3.7 46.0  3.5   MONO 7.6  0.7 7.8  0.6   BASO 0.09 0.07   NRBC 0 0       COAGULATION LAST 3 DAYS  Recent Labs   Lab 11/10/20  1510   LABPT 14.2   INR 1.2       CHEMISTRY LAST 3 DAYS  Recent Labs   Lab 11/10/20  1510 11/11/20  0311    139   K 3.7 3.6    101   CO2 27 28   ANIONGAP 11 10   BUN 24* 17   CREATININE 0.8 0.7   GLU 91 98   CALCIUM 9.7 9.2   MG 2.0  --    ALBUMIN 4.6 3.9   PROT 8.2 6.8   ALKPHOS 75 59   ALT 15 13   AST 21 19   BILITOT 0.5 0.9       CARDIAC PROFILE LAST 3 DAYS  Recent Labs   Lab 11/10/20  1510 11/10/20  2042 11/11/20  0311   BNP 87  87  --   --    TROPONINI <0.030 <0.030 <0.030       ENDOCRINE LAST 3 DAYS  Recent Labs   Lab 11/10/20  1510   TSH 1.630       LAST ARTERIAL BLOOD GAS  ABG  No results for input(s): PH, PO2, PCO2, HCO3, BE in the last 168 hours.    LAST 7 DAYS MICROBIOLOGY   Microbiology Results (last 7 days)     ** No results found for the last 168 hours. **          MOST RECENT IMAGING  US Carotid Bilateral  Reason: Syncope    Grayscale, color and spectral Doppler analysis was performed. Criteria  for stenosis is based upon the Society of Radiologists in Ultrasound  Consensus Conference, 2003 (Radiology, November 2003, 229, 340-346).    Comparison: None    Findings:  Grayscale images show mild soft plaque at the bulbs with intimal wall  thickening    Estimated peak systolic velocity in right internal carotid artery is  61 cm/s. Antegrade flow is present in the right vertebral artery.    Estimated peak systolic velocity in left internal carotid artery is 60  cm/s. Antegrade flow is present in the left vertebral artery.    Impression:  No hemodynamically significant internal carotid artery stenosis.    Electronically Signed by Mayra  Liliane JEROME on 11/11/2020 8:31 AM      ECHOCARDIOGRAM RESULTS (last 5)  Results for orders placed during the hospital encounter of 02/16/20   STRESS TEST REPORT       CURRENT/PREVIOUS VISIT EKG  Results for orders placed or performed during the hospital encounter of 11/10/20   EKG 12-lead    Collection Time: 11/10/20  2:44 PM    Narrative    Test Reason : R07.9,    Vent. Rate : 088 BPM     Atrial Rate : 088 BPM     P-R Int : 182 ms          QRS Dur : 076 ms      QT Int : 362 ms       P-R-T Axes : 060 -24 052 degrees     QTc Int : 438 ms    Normal sinus rhythm  Septal infarct (cited on or before 16-FEB-2020)  Abnormal ECG Leftward axis  When compared with ECG of 16-FEB-2020 02:27,  No significant change was found  Confirmed by Madan Benitez MD ([id number]) on 11/11/2020 8:29:36 AM    Referred By: [id number]   SELF           Confirmed By:Madan Benitez MD           ASSESSMENT/PLAN:     Active Hospital Problems    Diagnosis    *Near syncope    Palpitations    Chest pain    Hyperlipidemia    Essential hypertension       ASSESSMENT & PLAN:   1.  Recurrent palpitations  2.  Near-syncope  3.  Mitral valve prolapse  4.  Mixed hyperlipidemia  5.  Essential hypertension      RECOMMENDATIONS:  1.  Patient has had palpitations ever since she was young girl but there were short in brief she would have a transient episode and spontaneously resolved.  This time patient had recurrent palpitations that lasted for few minutes which resulted in dizziness and lightheadedness possibly caused hypotension and blurred vision.  2.  24 hr Holter monitor to evaluate for palpitation  3.  Will also schedule her for exercise stress echo to evaluate for ischemia as well as to induce any arrhythmias.  4.  Will do a 2D echo for LV function ejection fraction and for mitral valve prolapse.  And also for mitral regurgitation  5.  She is currently on metoprolol succinate 50 mg p.o. q.day continue the same.  Patient had been bradycardic all her life and  she had been asymptomatic with the bradycardia.  6.  Continue amlodipine 5 mg p.o. q.day.  7.  Eventually she would probably need an EP study to evaluate for arrhythmias and possible ablation and in the meantime consideration for implantable loop recorder.        Madan Benitez MD  [facility]  Department of Cardiology  Date of Service: 11/11/2020  11:07 AM

## 2020-11-11 NOTE — PROGRESS NOTES
[facility]    Progress Note    Patient Name: Marga Pak  MRN: [id number]  Patient Class: OP- Observation   Admission Date: 11/10/2020  3:22 PM  Length of Stay: 0  Attending Physician: DOMENICO ISSA MD  Primary Care Provider: Cecily Spring MD  Face-to-Face encounter date: 11/11/2020  Code status:  Chief Complaint: Palpitations         Patient ID: Marga Pak is a 65 y.o. female admitted for   Active Hospital Problems    Diagnosis  POA    *Near syncope [R55]  Yes    Palpitations [R00.2]  Yes    Chest pain [R07.9]  Yes    Hyperlipidemia [E78.5]  Yes    Essential hypertension [I10]  Yes      Resolved Hospital Problems   No resolved problems to display.      HISTORY OF PRESENT ILLNESS by Sweta Carmona NP 11/10/2020:   Marga Pak is a 65 y.o. old  female who  has a past medical history of Colon torsion, Coronary artery disease, Gastroparesis, GERD (gastroesophageal reflux disease), Hyperlipidemia, Hypertension, and Sciatica.. The patient presented to [facility] on [facility]/10/2020 with a primary complaint of Palpitations    65 year old   female presents to emergency room with lightheadedness dizziness and near syncope with palpitations.    The patient states that she has a history of mitral valve prolapse and near-syncope episodes.  The patient states she will have near-syncope episode about 3 times a year.    Today while she was driving sitting she had lightheadedness and dizziness this occurred around 10 30 in the morning.  The patient states while she was sitting felt like she was going to pass out.  She pulled over on the side of the road and restful while and was eventually able to make it home.   Once she arrives home the lightheadedness and dizziness persist.  She denied associated headache, chest pain, cough, fever, chills, headaches or actual syncope episode she describes her symptoms as moderate to severe severity with no  exacerbating or alleviating factors      Subjective:    Interval History: Patient sitting in bed.  Reports she was seen by Dr. GIOVANA Benitez this morning and they plan to do stress echo tomorrow morning.  And 1 more study but patient could not recall  Patient informed that she has been experiencing palpitations since teenage, but this time it was worse and would not resolve.  Palpitation were associated with dizzy spell and elevated blood pressure.  Currently states she felt the palpitation all night long but now it has calm down  Family present at bedside.  No concerns/issues overnight reported by the patient or the nursing staff.  Case also discussed with patient's nurse Elle at bedside    Review of Systems All other Review of Systems were found to be negative expect for that mentioned already in HPI.     Objective:     Vitals:    11/10/20 2138 11/10/20 2300 11/11/20 0115 11/11/20 0333   BP: (!) 162/103 (!) 168/74 (!) 104/55 (!) 107/57   BP Location: Right arm Right arm Right arm    Patient Position: Lying Lying Lying    Pulse: 76 61 (!) 59 (!) 56   Resp: 20 20 18   Temp: 97.8 °F (36.6 °C) 98.2 °F (36.8 °C)  97.9 °F (36.6 °C)   TempSrc: Oral Oral  Oral   SpO2: 97% 100% 97% 99%   Weight:       Height:            Vitals reviewed.  Constitutional: No distress.  Looks very good for her stated age  HENT: NC  Head: Atraumatic.   Cardiovascular: Normal rate, regular rhythm and 2/6 systolic murmur.   Pulmonary/Chest: Effort normal. No wheezes.   Abdominal: Soft. Bowel sounds are normal. No distension and no mass. No tenderness  Neurological: Alert.   Skin: Skin is warm and dry.   Psych: Appropriate mood and affect    Following labs were Reviewed   CBC:  Recent Labs   Lab 11/11/20 0311   WBC 7.67   HGB 12.3   HCT 37.0        CMP:  Recent Labs   Lab 11/11/20 0311   CALCIUM 9.2   ALBUMIN 3.9   PROT 6.8      K 3.6   CO2 28      BUN 17   CREATININE 0.7   ALKPHOS 59   ALT 13   AST 19   BILITOT 0.9     Last 72  hour POCT GLUCOSE  No results found for: POCTGLUCOSE     Microbiology Results (last 7 days)     ** No results found for the last 168 hours. **            X-Ray Chest PA And Lateral   Final Result      US Carotid Bilateral    (Results Pending)         Scheduled Meds:   amLODIPine  5 mg Oral QHS    aspirin  81 mg Oral Daily    atorvastatin  20 mg Oral Daily    enoxaparin  40 mg Subcutaneous Q24H    famotidine  20 mg Oral Daily    metoprolol succinate  50 mg Oral Daily     Continuous Infusions:  PRN Meds:.acetaminophen, cloNIDine, HYDROcodone-acetaminophen, melatonin, ondansetron, sodium chloride 0.9%    Chart reviewed    02/17/2020 Treadmill stress test    The EKG portion of this study is negative for ischemia.    The patient reported no chest pain during the stress test.    Arrhythmias during stress: occasional PACs, atrial fibrillation.    The exercise capacity was above average.    11/11/2020 ultrasound carotid bilateral  Impression:   No hemodynamically significant internal carotid artery stenosis.   Electronically Signed by Mayra Momin M.D. on 11/11/2020 8:31 AM        Assessment & Plan:     Active Hospital Problems    Diagnosis    *Near syncope    Palpitations    Chest pain    Hyperlipidemia    Essential hypertension     Near syncope  Palpitations  Continu  IV hydration  Fall precautions  Orthostatics on admission were negative  Cardiology Dr. GIOVANA Benitez consulted, appreciate recommendation  Carotid Doppler studies negative  Continue metoprolol succinate 50 mg daily  Continue Tele monitoring  Plan for exercise stress echo tomorrow to evaluate for ischemia as well as induce any arrhythmia  Also plan to have 2D echo for left ventricular function ejection fraction and for mitral valve prolapse/ mitral regurg  Eventually need an EP study to evaluate for arrhythmias and possible ablation in the meantime consideration for implantable loop recorder    Essential hypertension  Chronic medical  condition  Continue home pharmacologic to manage blood pressure     Hyperlipidemia  LFT stable continue statins     History of coronary artery disease  Chest pain-free     DVT Prophylaxis: will be placed on SCDs for DVT prophylaxis and will be advised to be as mobile as possible and sit in a chair as tolerated.       Discharge Plannin-2 days     Above encounter included review of the medical records, interviewing and examining the patient face-to-face, discussion with family and other health care providers, ordering and interpreting lab/test results and formulating a plan of care.     Medical Decision Making:      [_] Low Complexity  [_] Moderate Complexity  [x] High Complexity      Zaki Figueroa MD  Department of Hospital Medicine   [facility]

## 2020-11-11 NOTE — H&P
[facility] History & Physical Examination   Patient Name: Marga Pak  MRN: [id number]  Patient Class: Emergency   Admission Date: 11/10/2020  3:22 PM  Length of Stay: 0  Attending Physician:   Primary Care Provider: Cecily Spring MD  Face-to-Face encounter date: 11/10/2020  Code Status:Full Code  MPOA:  Chief Complaint: Palpitations        Patient information was obtained from patient, past medical records and ER records.   HISTORY OF PRESENT ILLNESS:   Marga Pak is a 65 y.o. old  female who  has a past medical history of Colon torsion, Coronary artery disease, Gastroparesis, GERD (gastroesophageal reflux disease), Hyperlipidemia, Hypertension, and Sciatica.. The patient presented to [facility] on 11/10/2020 with a primary complaint of Palpitations  .   65 year old   female presents to emergency room with lightheadedness dizziness and near syncope with palpitations.      The patient states that she has a history of mitral valve prolapse and near-syncope episodes.  The patient states she will have near-syncope episode about 3 times a year.      Today while she was driving sitting she had lightheadedness and dizziness this occurred around 10 30 in the morning.  The patient states while she was sitting felt like she was going to pass out.  She pulled over on the side of the road and restful while and was eventually able to make it home.      Once she arrives home the lightheadedness and dizziness persist.  She denied associated headache, chest pain, cough, fever, chills, headaches or actual syncope episode she describes her symptoms as moderate to severe severity with no exacerbating or alleviating factors    REVIEW OF SYSTEMS:   10 Point Review of System was performed and was found to be negative except for that mentioned already in the HPI and   Review of Systems (Negative unless checked off)  Review of Systems   Constitutional:  Negative.    HENT: Negative.    Eyes: Negative.    Respiratory: Negative.    Cardiovascular: Positive for palpitations.   Gastrointestinal: Negative.    Genitourinary: Negative.    Musculoskeletal: Negative.    Skin: Negative.    Neurological: Positive for dizziness and headaches.   Endo/Heme/Allergies: Negative.    Psychiatric/Behavioral: Negative.            PAST MEDICAL HISTORY:     Past Medical History:   Diagnosis Date    Colon torsion     Coronary artery disease     CAD, pt states nild MI    Gastroparesis     GERD (gastroesophageal reflux disease)     Hyperlipidemia     Hypertension     Sciatica        PAST SURGICAL HISTORY:     Past Surgical History:   Procedure Laterality Date    ABDOMINAL SURGERY      APPENDECTOMY      COLON SURGERY      due to torsion, clipped adhesions    COLONOSCOPY  08/13/2014    JUNIOR.   One 1 mm polyp in the distal ascending colon.  Nonneoplastic colonic mucosa with reactive/regenerative changes.  Nodule (inverted stump?) at the appendiceal orifice.  Nonneoplastic colonic mucosa with prominent lymphoid aggregates.    COLONOSCOPY N/A 10/12/2020    Procedure: COLONOSCOPY;  Surgeon: Robb Kwong Jr., MD;  Location: [facility];  Service: Endoscopy;  Laterality: N/A;    ESOPHAGOGASTRODUODENOSCOPY      HYSTERECTOMY      OOPHORECTOMY         ALLERGIES:   Bee sting [allergen ext-venom-honey bee] and Iodine and iodide containing products    FAMILY HISTORY:     Family History   Problem Relation Age of Onset    Breast cancer Sister 58    Ovarian cancer Maternal Grandmother        SOCIAL HISTORY:     Social History     Tobacco Use    Smoking status: Never Smoker    Smokeless tobacco: Never Used   Substance Use Topics    Alcohol use: No     Frequency: Monthly or less     Drinks per session: 1 or 2     Binge frequency: Never        Social History     Substance and Sexual Activity   Sexual Activity Not on file        HOME MEDICATIONS:     Prior to Admission medications   "  Medication Sig Start Date End Date Taking? Authorizing Provider   amLODIPine (NORVASC) 5 MG tablet Take 1 tablet (5 mg total) by mouth every evening. 9/2/20 9/2/21 Yes Bill Pedro MD   aspirin (ASPIR-81 ORAL) Take 1 tablet by mouth once daily.   Yes Historical Provider   atorvastatin (LIPITOR) 20 MG tablet Take 1 tablet (20 mg total) by mouth once daily. 12/31/19  Yes Bill Pedro MD   cetirizine (ZYRTEC) 10 MG tablet Take 10 mg by mouth once daily.   Yes Historical Provider   metoprolol succinate (TOPROL-XL) 50 MG 24 hr tablet Take 1 tablet (50 mg total) by mouth once daily. TAKE 1 TABLET(50 MG) BY MOUTH EVERY DAY 8/13/20  Yes Bill Pedro MD   estrogens, conjugated, (PREMARIN) 0.9 MG Tab TAKE 1 TABLET(0.9 MG) BY MOUTH EVERY DAY  Patient not taking: Reported on 10/9/2020 12/31/19   Ed Sweeney MD   famotidine (PEPCID) 20 MG tablet Take 1 tablet (20 mg total) by mouth 2 (two) times daily.  Patient not taking: Reported on 10/9/2020 12/17/19 12/16/20  Ed Sweeney MD   furosemide (LASIX) 20 MG tablet TAKE 1 TABLET BY MOUTH DAILY AS NEEDED FOR SWELLING OF LEGS, ANKLES AND FEET 9/16/20   Bill Pedro MD   levocetirizine (XYZAL) 5 MG tablet TK 1 T PO  QAM PRF SINUS ALLERGY OR DRIP 11/7/17   Historical Provider   ondansetron (ZOFRAN-ODT) 4 MG TbDL Take 1 tablet (4 mg total) by mouth every 6 (six) hours as needed (for vomiting or nausea).  Patient not taking: Reported on 10/9/2020 11/2/18   Hui Zhong MD   promethazine (PHENERGAN) 25 MG suppository Place 1 suppository (25 mg total) rectally every 6 (six) hours as needed for Nausea. 11/2/18   Hui Zhong MD         PHYSICAL EXAM:   BP (!) 169/72   Pulse 68   Temp 98.6 °F (37 °C) (Oral)   Resp 15   Ht 5' 5" (1.651 m)   Wt 63.5 kg (140 lb)   SpO2 100%   BMI 23.30 kg/m²   Vitals Reviewed  General appearance: Well-developed, well-nourished female in no apparent distress.  Skin: No Rash.   Neuro: Motor and " sensory exams grossly intact. Good tone. Power in all 4 extremities 5/5.   HENT: Atraumatic head. Moist mucous membranes of oral cavity.  Eyes: Normal extraocular movements.   Neck: Supple. No evidence of lymphadenopathy. No thyroidomegaly.  Lungs: Clear to auscultation bilaterally. No wheezing present.   Heart: Regular rate and rhythm. S1 and S2 present with no murmurs/gallop/rub. No pedal edema. No JVD present.   Abdomen: Soft, non-distended, non-tender. No rebound tenderness/guarding. No masses or organomegaly. Bowel sounds are normal. Bladder is not palpable.   Extremities: No cyanosis, clubbing, or edema.  Psych/mental status: Alert and oriented. Cooperative. Responds appropriately to questions.   EMERGENCY DEPARTMENT LABS AND IMAGING:   Following labs were Reviewed   Recent Labs   Lab 11/10/20  1510   WBC 9.21   HGB 12.9   HCT 39.5      CALCIUM 9.7   ALBUMIN 4.6   PROT 8.2      K 3.7   CO2 27      BUN 24*   CREATININE 0.8   ALKPHOS 75   ALT 15   AST 21   BILITOT 0.5         BMP:   Recent Labs   Lab 11/10/20  1510   GLU 91      K 3.7      CO2 27   BUN 24*   CREATININE 0.8   CALCIUM 9.7   MG 2.0   , CMP   Recent Labs   Lab 11/10/20  1510      K 3.7      CO2 27   GLU 91   BUN 24*   CREATININE 0.8   CALCIUM 9.7   PROT 8.2   ALBUMIN 4.6   BILITOT 0.5   ALKPHOS 75   AST 21   ALT 15   ANIONGAP 11   ESTGFRAFRICA >60.0   EGFRNONAA >60.0   , CBC   Recent Labs   Lab 11/10/20  1510   WBC 9.21   HGB 12.9   HCT 39.5      , INR   Lab Results   Component Value Date    INR 1.2 11/10/2020   , Lipid Panel   Lab Results   Component Value Date    CHOL 237 (H) 11/14/2017    HDL 75 11/14/2017    LDLCALC 146.4 11/14/2017    TRIG 78 11/14/2017    CHOLHDL 31.6 11/14/2017   , Troponin   Recent Labs   Lab 11/10/20  1510   TROPONINI <0.030   , A1C: No results for input(s): HGBA1C in the last 4320 hours. and All labs within the past 24 hours have been reviewed  Microbiology Results (last 7  days)     ** No results found for the last 168 hours. **        X-Ray Chest PA And Lateral   Final Result        X-ray Chest Pa And Lateral    Result Date: 11/10/2020  Reason: Chest Pain FINDINGS: PA and lateral chest chest x-ray February 16, 2020 show normal cardiomediastinal silhouette. Lungs are clear. Pulmonary vasculature is normal. No acute osseous abnormality. IMPRESSION: No acute cardiopulmonary abnormality. Electronically Signed by Gasper Cordon on 11/10/2020 2:58 PM    Twelve lead EKG reveals Normal sinus rhythm left axis deviation poor R-wave progression ST flattening in the lateral leads rate 88  milliseconds  ASSESSMENT & PLAN:   Marga Pak is a 65 y.o. female admitted for    1.  Near syncope  -IV hydration  -fall precautions  -cardiology consult  -carotid Doppler studies      2.  Palpitations  -continue metoprolol  -tele monitoring      3.  Essential hypertension  -continue home pharmacologic Jatin to manage blood pressure    4.  Hyperlipidemia  -LFT stable continue statins    5.  History of coronary artery disease  -chest pain-free    DVT Prophylaxis: will be placed on SCDs for DVT prophylaxis and will be advised to be as mobile as possible and sit in a chair as tolerated.   ________________________________________________________________________________    Discharge Planning and Disposition: No mobility needs. Ambulating well. Good social support system. Patient will be discharged in   Face-to-Face encounter date: 11/10/2020  Encounter included review of the medical records, interviewing and examining the patient face-to-face, discussion with family and other health care providers including emergency medicine physician, admission orders, interpreting lab/test results and formulating a plan of care.   Medical Decision Making during this encounter was  [_] Low Complexity  [_] Moderate Complexity  [x] High  Complexity  _________________________________________________________________________________    INPATIENT LIST OF MEDICATIONS   No current facility-administered medications for this encounter.     Current Outpatient Medications:     amLODIPine (NORVASC) 5 MG tablet, Take 1 tablet (5 mg total) by mouth every evening., Disp: 90 tablet, Rfl: 1    aspirin (ASPIR-81 ORAL), Take 1 tablet by mouth once daily., Disp: , Rfl:     atorvastatin (LIPITOR) 20 MG tablet, Take 1 tablet (20 mg total) by mouth once daily., Disp: 90 tablet, Rfl: 3    cetirizine (ZYRTEC) 10 MG tablet, Take 10 mg by mouth once daily., Disp: , Rfl:     metoprolol succinate (TOPROL-XL) 50 MG 24 hr tablet, Take 1 tablet (50 mg total) by mouth once daily. TAKE 1 TABLET(50 MG) BY MOUTH EVERY DAY, Disp: 90 tablet, Rfl: 2    estrogens, conjugated, (PREMARIN) 0.9 MG Tab, TAKE 1 TABLET(0.9 MG) BY MOUTH EVERY DAY (Patient not taking: Reported on 10/9/2020), Disp: 90 tablet, Rfl: 3    famotidine (PEPCID) 20 MG tablet, Take 1 tablet (20 mg total) by mouth 2 (two) times daily. (Patient not taking: Reported on 10/9/2020), Disp: 60 tablet, Rfl: 1    furosemide (LASIX) 20 MG tablet, TAKE 1 TABLET BY MOUTH DAILY AS NEEDED FOR SWELLING OF LEGS, ANKLES AND FEET, Disp: 5 tablet, Rfl: 0    levocetirizine (XYZAL) 5 MG tablet, TK 1 T PO  QAM PRF SINUS ALLERGY OR DRIP, Disp: , Rfl: 6    ondansetron (ZOFRAN-ODT) 4 MG TbDL, Take 1 tablet (4 mg total) by mouth every 6 (six) hours as needed (for vomiting or nausea). (Patient not taking: Reported on 10/9/2020), Disp: 21 tablet, Rfl: 0    promethazine (PHENERGAN) 25 MG suppository, Place 1 suppository (25 mg total) rectally every 6 (six) hours as needed for Nausea., Disp: 10 suppository, Rfl: 0      Scheduled Meds:  Continuous Infusions:  PRN Meds:.      Candelaria Carmona  [facility] Hospitalist NP  11/10/2020

## 2020-11-12 ENCOUNTER — CLINICAL SUPPORT (OUTPATIENT)
Dept: CARDIOLOGY | Facility: HOSPITAL | Age: 65
End: 2020-11-12
Attending: INTERNAL MEDICINE
Payer: MEDICARE

## 2020-11-12 VITALS
OXYGEN SATURATION: 99 % | SYSTOLIC BLOOD PRESSURE: 130 MMHG | HEART RATE: 64 BPM | WEIGHT: 149.94 LBS | DIASTOLIC BLOOD PRESSURE: 61 MMHG | TEMPERATURE: 98 F | HEIGHT: 64 IN | RESPIRATION RATE: 19 BRPM | BODY MASS INDEX: 25.6 KG/M2

## 2020-11-12 VITALS — WEIGHT: 149.94 LBS | BODY MASS INDEX: 25.6 KG/M2 | HEIGHT: 64 IN

## 2020-11-12 PROBLEM — R55 NEAR SYNCOPE: Status: RESOLVED | Noted: 2020-11-10 | Resolved: 2020-11-12

## 2020-11-12 PROBLEM — R07.9 CHEST PAIN: Status: RESOLVED | Noted: 2020-11-10 | Resolved: 2020-11-12

## 2020-11-12 PROBLEM — I47.10 PAROXYSMAL SVT (SUPRAVENTRICULAR TACHYCARDIA): Status: ACTIVE | Noted: 2020-11-12

## 2020-11-12 LAB
ALBUMIN SERPL BCP-MCNC: 4 G/DL (ref 3.5–5.2)
ALP SERPL-CCNC: 57 U/L (ref 55–135)
ALT SERPL W/O P-5'-P-CCNC: 11 U/L (ref 10–44)
ANION GAP SERPL CALC-SCNC: 8 MMOL/L (ref 8–16)
ANION GAP SERPL CALC-SCNC: 8 MMOL/L (ref 8–16)
APTT PPP: 27.8 SEC (ref 23.6–33.3)
AST SERPL-CCNC: 19 U/L (ref 10–40)
BASOPHILS # BLD AUTO: 0.05 K/UL (ref 0–0.2)
BASOPHILS # BLD AUTO: 0.05 K/UL (ref 0–0.2)
BASOPHILS NFR BLD: 0.7 % (ref 0–1.9)
BASOPHILS NFR BLD: 0.8 % (ref 0–1.9)
BILIRUB SERPL-MCNC: 0.9 MG/DL (ref 0.1–1)
BSA FOR ECHO PROCEDURE: 1.75 M2
BUN SERPL-MCNC: 19 MG/DL (ref 8–23)
BUN SERPL-MCNC: 23 MG/DL (ref 8–23)
CALCIUM SERPL-MCNC: 9.4 MG/DL (ref 8.7–10.5)
CALCIUM SERPL-MCNC: 9.4 MG/DL (ref 8.7–10.5)
CHLORIDE SERPL-SCNC: 100 MMOL/L (ref 95–110)
CHLORIDE SERPL-SCNC: 102 MMOL/L (ref 95–110)
CO2 SERPL-SCNC: 29 MMOL/L (ref 23–29)
CO2 SERPL-SCNC: 29 MMOL/L (ref 23–29)
CREAT SERPL-MCNC: 0.7 MG/DL (ref 0.5–1.4)
CREAT SERPL-MCNC: 0.8 MG/DL (ref 0.5–1.4)
CV STRESS BASE HR: 76 BPM
DIASTOLIC BLOOD PRESSURE: 72 MMHG
DIFFERENTIAL METHOD: ABNORMAL
DIFFERENTIAL METHOD: ABNORMAL
EOSINOPHIL # BLD AUTO: 0 K/UL (ref 0–0.5)
EOSINOPHIL # BLD AUTO: 0.1 K/UL (ref 0–0.5)
EOSINOPHIL NFR BLD: 0.4 % (ref 0–8)
EOSINOPHIL NFR BLD: 1.4 % (ref 0–8)
ERYTHROCYTE [DISTWIDTH] IN BLOOD BY AUTOMATED COUNT: 12.4 % (ref 11.5–14.5)
ERYTHROCYTE [DISTWIDTH] IN BLOOD BY AUTOMATED COUNT: 12.5 % (ref 11.5–14.5)
EST. GFR  (AFRICAN AMERICAN): >60 ML/MIN/1.73 M^2
EST. GFR  (AFRICAN AMERICAN): >60 ML/MIN/1.73 M^2
EST. GFR  (NON AFRICAN AMERICAN): >60 ML/MIN/1.73 M^2
EST. GFR  (NON AFRICAN AMERICAN): >60 ML/MIN/1.73 M^2
GLUCOSE SERPL-MCNC: 90 MG/DL (ref 70–110)
GLUCOSE SERPL-MCNC: 98 MG/DL (ref 70–110)
HCT VFR BLD AUTO: 36.6 % (ref 37–48.5)
HCT VFR BLD AUTO: 37.5 % (ref 37–48.5)
HGB BLD-MCNC: 12 G/DL (ref 12–16)
HGB BLD-MCNC: 12.1 G/DL (ref 12–16)
IMM GRANULOCYTES # BLD AUTO: 0.01 K/UL (ref 0–0.04)
IMM GRANULOCYTES # BLD AUTO: 0.01 K/UL (ref 0–0.04)
IMM GRANULOCYTES NFR BLD AUTO: 0.1 % (ref 0–0.5)
IMM GRANULOCYTES NFR BLD AUTO: 0.2 % (ref 0–0.5)
INR PPP: 1.1
LYMPHOCYTES # BLD AUTO: 2.6 K/UL (ref 1–4.8)
LYMPHOCYTES # BLD AUTO: 3.8 K/UL (ref 1–4.8)
LYMPHOCYTES NFR BLD: 37.4 % (ref 18–48)
LYMPHOCYTES NFR BLD: 60.2 % (ref 18–48)
MCH RBC QN AUTO: 30.3 PG (ref 27–31)
MCH RBC QN AUTO: 30.6 PG (ref 27–31)
MCHC RBC AUTO-ENTMCNC: 32.3 G/DL (ref 32–36)
MCHC RBC AUTO-ENTMCNC: 32.8 G/DL (ref 32–36)
MCV RBC AUTO: 93 FL (ref 82–98)
MCV RBC AUTO: 94 FL (ref 82–98)
MONOCYTES # BLD AUTO: 0.4 K/UL (ref 0.3–1)
MONOCYTES # BLD AUTO: 0.5 K/UL (ref 0.3–1)
MONOCYTES NFR BLD: 6.9 % (ref 4–15)
MONOCYTES NFR BLD: 7.1 % (ref 4–15)
NEUTROPHILS # BLD AUTO: 1.9 K/UL (ref 1.8–7.7)
NEUTROPHILS # BLD AUTO: 3.7 K/UL (ref 1.8–7.7)
NEUTROPHILS NFR BLD: 30.5 % (ref 38–73)
NEUTROPHILS NFR BLD: 54.3 % (ref 38–73)
NRBC BLD-RTO: 0 /100 WBC
NRBC BLD-RTO: 0 /100 WBC
OHS CV CPX 85 PERCENT MAX PREDICTED HEART RATE MALE: 126
OHS CV CPX ESTIMATED METS: 10
OHS CV CPX MAX PREDICTED HEART RATE: 149
OHS CV CPX PATIENT IS FEMALE: 1
OHS CV CPX PATIENT IS MALE: 0
OHS CV CPX PEAK DIASTOLIC BLOOD PRESSURE: 67 MMHG
OHS CV CPX PEAK HEAR RATE: 204 BPM
OHS CV CPX PEAK RATE PRESSURE PRODUCT: NORMAL
OHS CV CPX PEAK SYSTOLIC BLOOD PRESSURE: 200 MMHG
OHS CV CPX PERCENT MAX PREDICTED HEART RATE ACHIEVED: 137
OHS CV CPX RATE PRESSURE PRODUCT PRESENTING: 9196
PLATELET # BLD AUTO: 276 K/UL (ref 150–350)
PLATELET # BLD AUTO: 278 K/UL (ref 150–350)
PMV BLD AUTO: 10 FL (ref 9.2–12.9)
PMV BLD AUTO: 10.5 FL (ref 9.2–12.9)
POTASSIUM SERPL-SCNC: 3.8 MMOL/L (ref 3.5–5.1)
POTASSIUM SERPL-SCNC: 4 MMOL/L (ref 3.5–5.1)
PROT SERPL-MCNC: 6.9 G/DL (ref 6–8.4)
PROTHROMBIN TIME: 13.2 SEC (ref 10.6–14.8)
RBC # BLD AUTO: 3.92 M/UL (ref 4–5.4)
RBC # BLD AUTO: 3.99 M/UL (ref 4–5.4)
SODIUM SERPL-SCNC: 137 MMOL/L (ref 136–145)
SODIUM SERPL-SCNC: 139 MMOL/L (ref 136–145)
STRESS ANGINA INDEX: 0
STRESS ECHO POST EXERCISE DUR MIN: 8 MINUTES
STRESS ECHO POST EXERCISE DUR SEC: 6 SECONDS
SYSTOLIC BLOOD PRESSURE: 121 MMHG
WBC # BLD AUTO: 6.35 K/UL (ref 3.9–12.7)
WBC # BLD AUTO: 6.89 K/UL (ref 3.9–12.7)

## 2020-11-12 PROCEDURE — G0378 HOSPITAL OBSERVATION PER HR: HCPCS

## 2020-11-12 PROCEDURE — 80048 BASIC METABOLIC PNL TOTAL CA: CPT

## 2020-11-12 PROCEDURE — 93306 TTE W/DOPPLER COMPLETE: CPT | Mod: 26,,, | Performed by: INTERNAL MEDICINE

## 2020-11-12 PROCEDURE — 25000003 PHARM REV CODE 250: Performed by: INTERNAL MEDICINE

## 2020-11-12 PROCEDURE — 85610 PROTHROMBIN TIME: CPT

## 2020-11-12 PROCEDURE — 33285 INSJ SUBQ CAR RHYTHM MNTR: CPT | Mod: ,,, | Performed by: INTERNAL MEDICINE

## 2020-11-12 PROCEDURE — 33285 PR INSERTION,SUBQ CARDIAC RHYTHM MONITOR, W/PRGRMG: ICD-10-PCS | Mod: ,,, | Performed by: INTERNAL MEDICINE

## 2020-11-12 PROCEDURE — 93306 ECHO (CUPID ONLY): ICD-10-PCS | Mod: 26,,, | Performed by: INTERNAL MEDICINE

## 2020-11-12 PROCEDURE — C1764 EVENT RECORDER, CARDIAC: HCPCS | Performed by: INTERNAL MEDICINE

## 2020-11-12 PROCEDURE — 85730 THROMBOPLASTIN TIME PARTIAL: CPT

## 2020-11-12 PROCEDURE — 25000003 PHARM REV CODE 250: Performed by: NURSE PRACTITIONER

## 2020-11-12 PROCEDURE — 36415 COLL VENOUS BLD VENIPUNCTURE: CPT

## 2020-11-12 PROCEDURE — 93306 TTE W/DOPPLER COMPLETE: CPT

## 2020-11-12 PROCEDURE — 85025 COMPLETE CBC W/AUTO DIFF WBC: CPT

## 2020-11-12 PROCEDURE — 93351 STRESS ECHO (CUPID ONLY): ICD-10-PCS | Mod: 26,,, | Performed by: INTERNAL MEDICINE

## 2020-11-12 PROCEDURE — 33285 INSJ SUBQ CAR RHYTHM MNTR: CPT | Performed by: INTERNAL MEDICINE

## 2020-11-12 PROCEDURE — 80053 COMPREHEN METABOLIC PANEL: CPT

## 2020-11-12 PROCEDURE — 93351 STRESS TTE COMPLETE: CPT | Mod: 26,,, | Performed by: INTERNAL MEDICINE

## 2020-11-12 PROCEDURE — 93351 STRESS TTE COMPLETE: CPT

## 2020-11-12 RX ORDER — MUPIROCIN 20 MG/G
1 OINTMENT TOPICAL
Status: DISCONTINUED | OUTPATIENT
Start: 2020-11-12 | End: 2020-11-12 | Stop reason: HOSPADM

## 2020-11-12 RX ORDER — LIDOCAINE HYDROCHLORIDE 10 MG/ML
INJECTION INFILTRATION; PERINEURAL
Status: DISCONTINUED | OUTPATIENT
Start: 2020-11-12 | End: 2020-11-12 | Stop reason: HOSPADM

## 2020-11-12 RX ORDER — METOPROLOL TARTRATE 1 MG/ML
5 INJECTION, SOLUTION INTRAVENOUS
Status: COMPLETED | OUTPATIENT
Start: 2020-11-12 | End: 2020-11-12

## 2020-11-12 RX ORDER — SODIUM CHLORIDE 9 MG/ML
INJECTION, SOLUTION INTRAVENOUS CONTINUOUS
Status: DISCONTINUED | OUTPATIENT
Start: 2020-11-12 | End: 2020-11-12 | Stop reason: HOSPADM

## 2020-11-12 RX ORDER — HYDROCODONE BITARTRATE AND ACETAMINOPHEN 5; 325 MG/1; MG/1
1 TABLET ORAL EVERY 8 HOURS PRN
Qty: 8 TABLET | Refills: 0 | Status: SHIPPED | OUTPATIENT
Start: 2020-11-12 | End: 2023-01-17

## 2020-11-12 RX ORDER — CEPHALEXIN 250 MG/1
250 CAPSULE ORAL EVERY 8 HOURS
Qty: 21 CAPSULE | Refills: 0 | Status: SHIPPED | OUTPATIENT
Start: 2020-11-12 | End: 2020-11-19

## 2020-11-12 RX ADMIN — METOPROLOL TARTRATE 5 MG: 1 INJECTION, SOLUTION INTRAVENOUS at 08:11

## 2020-11-12 RX ADMIN — ASPIRIN 81 MG: 81 TABLET, DELAYED RELEASE ORAL at 09:11

## 2020-11-12 RX ADMIN — METOPROLOL SUCCINATE 50 MG: 25 TABLET, FILM COATED, EXTENDED RELEASE ORAL at 09:11

## 2020-11-12 RX ADMIN — CEFAZOLIN 1 G: 330 INJECTION, POWDER, FOR SOLUTION INTRAMUSCULAR; INTRAVENOUS at 03:11

## 2020-11-12 RX ADMIN — FAMOTIDINE 20 MG: 20 TABLET, FILM COATED ORAL at 09:11

## 2020-11-12 NOTE — PROGRESS NOTES
[facility]  Department of Cardiology  Progress Note    PATIENT NAME: Marga Pak  MRN: [id number]  TODAY'S DATE: 11/12/2020  ADMIT DATE: 11/10/2020    SUBJECTIVE     PRINCIPLE PROBLEM: Near syncope    INTERVAL HISTORY:    11/12/2020  Patient is awake alert and doing well.  No specific complaints at the present time.  She underwent stress echocardiography.  And the stress tests looks normal.  She has not had any arrhythmias.    Review of patient's allergies indicates:   Allergen Reactions    Bee sting [allergen ext-venom-honey bee] Swelling    Iodine and iodide containing products Palpitations       REVIEW OF SYSTEMS  CARDIOVASCULAR: No recent chest pain, palpitations, arm, neck, or jaw pain  RESPIRATORY: No recent fever, cough chills, SOB or congestion  : No blood in the urine  GI: No Nausea, vomiting, constipation, diarrhea, blood, or reflux.  MUSCULOSKELETAL: No myalgias  NEURO: No lightheadedness or dizziness  EYES: No Double vision, blurry, vision or headache     OBJECTIVE     VITAL SIGNS (Most Recent)  Temp: 97.8 °F (36.6 °C) (11/12/20 0328)  Pulse: 62 (11/12/20 0328)  Resp: 18 (11/12/20 0328)  BP: (!) 142/65 (11/12/20 0328)  SpO2: 97 % (11/12/20 0328)    VENTILATION STATUS  Resp: 18 (11/12/20 0328)  SpO2: 97 % (11/12/20 0328)       I & O (Last 24H):    Intake/Output Summary (Last 24 hours) at 11/12/2020 0948  Last data filed at 11/12/2020 0400  Gross per 24 hour   Intake 350 ml   Output 600 ml   Net -250 ml       WEIGHTS  Wt Readings from Last 1 Encounters:   11/12/20 0328 68 kg (149 lb 14.6 oz)   11/10/20 2137 67.5 kg (148 lb 13 oz)   11/10/20 1434 63.5 kg (140 lb)       PHYSICAL EXAM  CONSTITUTIONAL: Well built, well nourished in no apparent distress  NECK: no carotid bruit, no JVD  LUNGS: CTA  CHEST WALL: no tenderness  HEART: regular rate and rhythm, S1, S2 normal, no murmur, click, rub or gallop   ABDOMEN: soft, non-tender; bowel sounds normal; no masses,  no  organomegaly  EXTREMITIES: Extremities normal, no edema  NEURO: AAO X 3    SCHEDULED MEDS:   amLODIPine  5 mg Oral QHS    aspirin  81 mg Oral Daily    atorvastatin  20 mg Oral Daily    enoxaparin  40 mg Subcutaneous Q24H    famotidine  20 mg Oral Daily    metoprolol succinate  50 mg Oral Daily       CONTINUOUS INFUSIONS:    PRN MEDS:acetaminophen, cloNIDine, HYDROcodone-acetaminophen, melatonin, ondansetron, sodium chloride 0.9%    LABS AND DIAGNOSTICS     CBC LAST 3 DAYS  Recent Labs   Lab 11/10/20  1510 11/11/20  0311 11/12/20  0420   WBC 9.21 7.67 6.35   RBC 4.24 3.95* 3.99*   HGB 12.9 12.3 12.1   HCT 39.5 37.0 37.5   MCV 93 94 94   MCH 30.4 31.1* 30.3   MCHC 32.7 33.2 32.3   RDW 12.8 12.5 12.4    268 278   MPV 10.5 10.3 10.5   GRAN 49.7  4.6 43.7  3.4 30.5*  1.9   LYMPH 40.2  3.7 46.0  3.5 60.2*  3.8   MONO 7.6  0.7 7.8  0.6 6.9  0.4   BASO 0.09 0.07 0.05   NRBC 0 0 0       COAGULATION LAST 3 DAYS  Recent Labs   Lab 11/10/20  1510   LABPT 14.2   INR 1.2       CHEMISTRY LAST 3 DAYS  Recent Labs   Lab 11/10/20  1510 11/11/20 0311 11/12/20  0420    139 137   K 3.7 3.6 3.8    101 100   CO2 27 28 29   ANIONGAP 11 10 8   BUN 24* 17 23   CREATININE 0.8 0.7 0.8   GLU 91 98 90   CALCIUM 9.7 9.2 9.4   MG 2.0  --   --    ALBUMIN 4.6 3.9 4.0   PROT 8.2 6.8 6.9   ALKPHOS 75 59 57   ALT 15 13 11   AST 21 19 19   BILITOT 0.5 0.9 0.9       CARDIAC PROFILE LAST 3 DAYS  Recent Labs   Lab 11/10/20  1510 11/10/20  2042 11/11/20  0311   BNP 87  87  --   --    TROPONINI <0.030 <0.030 <0.030       ENDOCRINE LAST 3 DAYS  Recent Labs   Lab 11/10/20  1510   TSH 1.630       LAST ARTERIAL BLOOD GAS  ABG  No results for input(s): PH, PO2, PCO2, HCO3, BE in the last 168 hours.    LAST 7 DAYS MICROBIOLOGY   Microbiology Results (last 7 days)     ** No results found for the last 168 hours. **          MOST RECENT IMAGING  US Carotid Bilateral  Reason: Syncope    Grayscale, color and spectral Doppler analysis  was performed. Criteria  for stenosis is based upon the Society of Radiologists in Ultrasound  Consensus Conference, 2003 (Radiology, November 2003, 229, 340-346).    Comparison: None    Findings:  Grayscale images show mild soft plaque at the bulbs with intimal wall  thickening    Estimated peak systolic velocity in right internal carotid artery is  61 cm/s. Antegrade flow is present in the right vertebral artery.    Estimated peak systolic velocity in left internal carotid artery is 60  cm/s. Antegrade flow is present in the left vertebral artery.    Impression:  No hemodynamically significant internal carotid artery stenosis.    Electronically Signed by Mayra Momin M.D. on 11/11/2020 8:31 AM      [facility]  No results found for this or any previous visit.    CURRENT/PREVIOUS VISIT EKG  Results for orders placed or performed during the hospital encounter of 11/10/20   EKG 12-lead    Collection Time: 11/10/20  2:44 PM    Narrative    Test Reason : R07.9,    Vent. Rate : 088 BPM     Atrial Rate : 088 BPM     P-R Int : 182 ms          QRS Dur : 076 ms      QT Int : 362 ms       P-R-T Axes : 060 -24 052 degrees     QTc Int : 438 ms    Normal sinus rhythm  Septal infarct (cited on or before 16-FEB-2020)  Abnormal ECG Leftward axis  When compared with ECG of 16-FEB-2020 02:27,  No significant change was found  Confirmed by Madan Benitez MD ([id number]) on 11/11/2020 8:29:36 AM    Referred By: [email or web address]   SELF           Confirmed By:Madan Benitez MD       ASSESSMENT/PLAN:     Active Hospital Problems    Diagnosis    *Near syncope    Palpitations    Chest pain    Hyperlipidemia    Essential hypertension       ASSESSMENT & PLAN:   1.  Recurrent palpitations  2.  Near-syncope  3.  Mitral valve prolapse  4.  Mixed hyperlipidemia  5.  Essential hypertension      RECOMMENDATIONS:  1.  Patient has been having recurrent palpitations ever since she was young girl.  And she had an near syncopal episode.  Etiology is unclear at  the present time.  On the monitor she has not had any arrhythmias.  2.  Discussed with patient in regards with further evaluation of her at arrhythmias and palpitations.  Options include a event monitor versus loop recorder.  Patient is planning to travel to [place] for a couple of months.  Would prefer to do a loop recorder.  Discussed with patient the risks involved including infection and abnormal sensation.  And cosmetic deformity of the breast.  3.  Patient wants proceed with doing the procedure.        Madan Benitez MD  [facility]  Department of Cardiology  Date of Service: 11/12/2020  9:48 AM

## 2020-11-12 NOTE — HOSPITAL COURSE
65-year-old  female with known past medical history of palpitation, hypertension, admitted 11/10/2020 with diagnosis of palpitation and near syncope.  Patient was admitted to cardiac unit.  Cardiology Dr. GIOVANA Benitez was consulted.  Carotid Doppler ultrasound was done.  IV hydration given and fall precautions taken.  Patient home medication metoprolol was continued for her palpitations and tele monitoring was set up.  The 1st night in the hospital, patient continued to feel palpitations off and on.  Next morning it all resolved.  Dr. GIOVANA Benitez evaluated the patient recommended doing stress echo and echocardiogram.  Patient went for stress echo and during the stress test, patient went into SVT.  Stress test was aborted  Patient received Lopressor in the [facility] and was transferred upstairs.   Same day patient received a loop recorder by Dr. GIOVANA Benitez.  Dr. GIOVANA Benitez recommended patient can be discharged home later today, patient to follow-up with Dr. GIOVANA Benitez next Thursday 11/19/2020.    Patient was seen and examined on the day of discharge  Vitals reviewed.  Constitutional: No distress.  Looks very good for her stated age  HENT: NC  Head: Atraumatic.   Cardiovascular: Normal rate, regular rhythm and 2/6 systolic murmur.  Dressing over the loop recorder incision on the left chest wall  Pulmonary/Chest: Effort normal. No wheezes.   Abdominal: Soft. Bowel sounds are normal. No distension and no mass. No tenderness  Neurological: Alert.   Skin: Skin is warm and dry.   Psych: Appropriate mood and affect

## 2020-11-12 NOTE — DISCHARGE INSTRUCTIONS
New prescription sent to pharmacy    Keflex 250 mg 1 tablet 3 times a day for 7 days  Hydrocodone 5/325 mg 1 tablet 8 hourly as needed for pain post procedure    Please call and make an appointment to Follow-up with Dr. GIOVANA Benitez on 11/19/2020

## 2020-11-12 NOTE — PLAN OF CARE
11/12/20 1631   Final Note   Assessment Type Final Discharge Note   Anticipated Discharge Disposition Home     Discharge orders reviewed.  Patient to discharge home this date with no needs.    Annamaria Ellison LCSW  Case Management  Ext. [phone]

## 2020-11-12 NOTE — DISCHARGE SUMMARY
[facility]  Discharge Summary      Patient Name: Marga Pak  MRN: [id number]  Admission Date: 11/10/2020  Hospital Length of Stay: 0 days  Discharge Date and Time:  11/12/2020 3:26 PM  Attending Physician: Zaki Figueroa MD   Discharging Provider: Zaki Figueroa MD  Primary Care Provider: Cecily Spring MD      HISTORY OF PRESENT ILLNESS by Sweta Carmona NP 11/10/2020:   Marga Pak is a 65 y.o. old  female who  has a past medical history of Colon torsion, Coronary artery disease, Gastroparesis, GERD (gastroesophageal reflux disease), Hyperlipidemia, Hypertension, and Sciatica.. The patient presented to [facility] on 11/10/2020 with a primary complaint of Palpitations     65 year old   female presents to emergency room with lightheadedness dizziness and near syncope with palpitations.    The patient states that she has a history of mitral valve prolapse and near-syncope episodes.  The patient states she will have near-syncope episode about 3 times a year.    Today while she was driving sitting she had lightheadedness and dizziness this occurred around 10 30 in the morning.  The patient states while she was sitting felt like she was going to pass out.  She pulled over on the side of the road and restful while and was eventually able to make it home.   Once she arrives home the lightheadedness and dizziness persist.  She denied associated headache, chest pain, cough, fever, chills, headaches or actual syncope episode she describes her symptoms as moderate to severe severity with no exacerbating or alleviating factors    Procedure(s) (LRB):  Insertion, Implantable Loop Recorder (Left)      Hospital Course:   65-year-old  female with known past medical history of palpitation, hypertension, admitted 11/10/2020 with diagnosis of palpitation and near syncope.  Patient was admitted to cardiac unit.  Cardiology Dr. GIOVANA Benitez was  consulted.  Carotid Doppler ultrasound was done.  IV hydration given and fall precautions taken.  Patient home medication metoprolol was continued for her palpitations and tele monitoring was set up.  The 1st night in the hospital, patient continued to feel palpitations off and on.  Next morning it all resolved.  Dr. GIOVANA Benitez evaluated the patient recommended doing stress echo and echocardiogram.  Patient went for stress echo and during the stress test, patient went into SVT.  Stress test was aborted  Patient received Lopressor in the [facility] and was transferred upstairs.   Same day patient received a loop recorder by Dr. GIOVANA Benitez.  Dr. GIOVANA Benitez recommended patient can be discharged home later today, patient to follow-up with Dr. GIOVANA Benitez next Thursday 11/19/2020.    Patient was seen and examined on the day of discharge  Vitals reviewed.  Constitutional: No distress.  Looks very good for her stated age  HENT: NC  Head: Atraumatic.   Cardiovascular: Normal rate, regular rhythm and 2/6 systolic murmur.  Dressing over the loop recorder incision on the left chest wall  Pulmonary/Chest: Effort normal. No wheezes.   Abdominal: Soft. Bowel sounds are normal. No distension and no mass. No tenderness  Neurological: Alert.   Skin: Skin is warm and dry.   Psych: Appropriate mood and affect     Consults:   Consults (From admission, onward)        Status Ordering Provider     Inpatient consult to Cardiology  Once     Provider:  Madan Benitez MD    Acknowledged BAR BARAHONA     Inpatient consult to Internal Medicine  Once     Provider:  Bar Barahona MD    Acknowledged ANDRADE RINCON          No new Assessment & Plan notes have been filed under this hospital service since the last note was generated.  Service: Hospital Medicine    Final Active Diagnoses:    Diagnosis Date Noted POA    Paroxysmal SVT (supraventricular tachycardia) [I47.1] 11/12/2020 Yes    Palpitations [R00.2] 11/10/2020 Yes     Hyperlipidemia [E78.5] 05/22/2018 Yes    Essential hypertension [I10] 01/16/2017 Yes      Problems Resolved During this Admission:    Diagnosis Date Noted Date Resolved POA    PRINCIPAL PROBLEM:  Near syncope [R55] 11/10/2020 11/12/2020 Yes    Chest pain [R07.9] 11/10/2020 11/12/2020 Yes       Discharged Condition: good    Disposition: Home or Self Care    Follow Up:  Follow-up Information     Cecily Spring MD. Schedule an appointment as soon as possible for a visit in 2 weeks.    Specialty: Internal Medicine  Why: Post hospital discharge follow-up  Contact information:  [address] [facility] [address]  [phone]             Madan Benitez MD. Schedule an appointment as soon as possible for a visit on 11/19/2020.    Specialties: Cardiovascular Disease, Cardiology, INTERVENTIONAL CARDIOLOGY  Why: Post hospital discharge follow-up for loop recorder placement  Contact information:  [address] [address]  [phone]                 Patient Instructions:      Diet Cardiac     Notify your health care provider if you experience any of the following:  redness, tenderness, or signs of infection (pain, swelling, redness, odor or green/yellow discharge around incision site)     Notify your health care provider if you experience any of the following:  temperature >100.4     Notify your health care provider if you experience any of the following:  persistent dizziness, light-headedness, or visual disturbances     Activity as tolerated       Significant Diagnostic Studies: Labs:   BMP:   Recent Labs   Lab 11/11/20 0311 11/12/20 0420 11/12/20  1224   GLU 98 90 98    137 139   K 3.6 3.8 4.0    100 102   CO2 28 29 29   BUN 17 23 19   CREATININE 0.7 0.8 0.7   CALCIUM 9.2 9.4 9.4   , CBC   Recent Labs   Lab 11/11/20 0311 11/12/20 0420 11/12/20  1224   WBC 7.67 6.35 6.89   HGB 12.3 12.1 12.0   HCT 37.0 37.5 36.6*    278 276   , INR   Lab Results   Component Value Date     INR 1.1 11/12/2020    INR 1.2 11/10/2020   , Lipid Panel   Lab Results   Component Value Date    CHOL 237 (H) 11/14/2017    HDL 75 11/14/2017    LDLCALC 146.4 11/14/2017    TRIG 78 11/14/2017    CHOLHDL 31.6 11/14/2017   , Troponin   Recent Labs   Lab 11/10/20  1510 11/10/20  2042 11/11/20  0311   TROPONINI <0.030 <0.030 <0.030    and A1C: No results for input(s): HGBA1C in the last 4320 hours.    Pending Diagnostic Studies:     Procedure Component Value Units Date/Time    Echo Color Flow Doppler? Yes; Bubble Contrast? No [[id number]] Resulted: 11/12/20 0819    Order Status: Sent Lab Status: In process Updated: 11/12/20 0820     BSA 1.75 m2      TDI SEPTAL 0.07 m/s      LV LATERAL E/E' RATIO 8.91 m/s      LV SEPTAL E/E' RATIO 14.00 m/s      Right Atrial Pressure (from IVC) 3 mmHg      AORTIC VALVE CUSP SEPERATION 1.85 cm      TDI LATERAL 0.11 m/s      PV PEAK VELOCITY 81.89 cm/s      LVIDd 3.65 cm      IVS 1.02 cm      Posterior Wall 1.01 cm      Ao root annulus 2.48 cm      LVIDs 2.58 cm      FS 29 %      LV mass 112.61 g      LA size 3.18 cm      RVDD 179.00 cm      Left Ventricle Relative Wall Thickness 0.55 cm      AV mean gradient 3 mmHg      AV valve area 3.15 cm2      AV Velocity Ratio 91.18     AV index (prosthetic) 1.02     E/A ratio 1.34     Mean e' 0.09 m/s      E wave decelartion time 215.48 msec      IVRT 91.17 msec      LVOT diameter 1.98 cm      LVOT area 3.1 cm2      LVOT peak rell 114.89 m/s      LVOT peak VTI 29.58 cm      Ao peak rell 1.26 m/s      Ao VTI 28.91 cm      LVOT stroke volume 91.03 cm3      AV peak gradient 6 mmHg      TV rest pulmonary artery pressure 32 mmHg      E/E' ratio 10.89 m/s      MV Peak E Rell 0.98 m/s      TR Max Rell 2.69 m/s      MV Peak A Rell 0.73 m/s      LV Mass Index 65 g/m2      Triscuspid Valve Regurgitation Peak Gradient 29 mmHg     Narrative:      · The estimated PA systolic pressure is 32 mmHg.       Impression:               Medications:  Reconciled Home  Medications:      Medication List      START taking these medications    cephALEXin 250 MG capsule  Commonly known as: KEFLEX  Take 1 capsule (250 mg total) by mouth every 8 (eight) hours. for 7 days     HYDROcodone-acetaminophen 5-325 mg per tablet  Commonly known as: NORCO  Take 1 tablet by mouth every 8 (eight) hours as needed.        CONTINUE taking these medications    amLODIPine 5 MG tablet  Commonly known as: NORVASC  Take 1 tablet (5 mg total) by mouth every evening.     ASPIR-81 ORAL  Take 1 tablet by mouth once daily.     atorvastatin 20 MG tablet  Commonly known as: LIPITOR  Take 1 tablet (20 mg total) by mouth once daily.     cetirizine 10 MG tablet  Commonly known as: ZYRTEC  Take 10 mg by mouth once daily.     estrogens (conjugated) 0.9 MG Tab  Commonly known as: PREMARIN  TAKE 1 TABLET(0.9 MG) BY MOUTH EVERY DAY     famotidine 20 MG tablet  Commonly known as: PEPCID  Take 1 tablet (20 mg total) by mouth 2 (two) times daily.     fish oil-omega-3 fatty acids 300-1,000 mg capsule  Take 1 capsule by mouth once daily. otc     levocetirizine 5 MG tablet  Commonly known as: XYZAL  TK 1 T PO  QAM PRF SINUS ALLERGY OR DRIP     metoprolol succinate 50 MG 24 hr tablet  Commonly known as: TOPROL-XL  Take 1 tablet (50 mg total) by mouth once daily. TAKE 1 TABLET(50 MG) BY MOUTH EVERY DAY     vitamin D 1000 units Tab  Commonly known as: VITAMIN D3  Take 1,000 Units by mouth once daily. OTC        STOP taking these medications    furosemide 20 MG tablet  Commonly known as: LASIX     ondansetron 4 MG Tbdl  Commonly known as: ZOFRAN-ODT     promethazine 25 MG suppository  Commonly known as: PHENERGAN            Indwelling Lines/Drains at time of discharge:   Lines/Drains/Airways     None                 Time spent on the discharge of patient: 30 minutes  Patient was seen and examined on the date of discharge and determined to be suitable for discharge.         Zaki Figueroa MD  Department of Hospital Medicine  [place]  [facility]

## 2020-11-12 NOTE — PLAN OF CARE
Problem: Fall Injury Risk  Goal: Absence of Fall and Fall-Related Injury  Outcome: Met  Intervention: Identify and Manage Contributors to Fall Injury Risk  Flowsheets (Taken 11/12/2020 1543)  Self-Care Promotion:   independence encouraged   BADL personal objects within reach  Medication Review/Management: medications reviewed  Intervention: Promote Injury-Free Environment  Flowsheets (Taken 11/12/2020 1543)  Safety Promotion/Fall Prevention:   assistive device/personal item within reach   bed alarm set   Fall Risk reviewed with patient/family   nonskid shoes/socks when out of bed   side rails raised x 2   instructed to call staff for mobility  Environmental Safety Modification:   assistive device/personal items within reach   clutter free environment maintained   room organization consistent   room near unit station   lighting adjusted     Problem: Adult Inpatient Plan of Care  Goal: Plan of Care Review  Outcome: Met  Goal: Patient-Specific Goal (Individualization)  Outcome: Met  Goal: Absence of Hospital-Acquired Illness or Injury  Outcome: Met  Intervention: Identify and Manage Fall Risk  Flowsheets (Taken 11/12/2020 1543)  Safety Promotion/Fall Prevention:   assistive device/personal item within reach   bed alarm set   Fall Risk reviewed with patient/family   nonskid shoes/socks when out of bed   side rails raised x 2   instructed to call staff for mobility  Intervention: Prevent VTE (venous thromboembolism)  Flowsheets (Taken 11/12/2020 1543)  VTE Prevention/Management:   ambulation promoted   bleeding precautions maintained   bleeding risk assessed  Goal: Optimal Comfort and Wellbeing  Outcome: Met  Goal: Readiness for Transition of Care  Outcome: Met  Goal: Rounds/Family Conference  Outcome: Met

## 2020-11-12 NOTE — PLAN OF CARE
Problem: Fall Injury Risk  Goal: Absence of Fall and Fall-Related Injury  Outcome: Ongoing, Progressing  Intervention: Identify and Manage Contributors to Fall Injury Risk  Flowsheets (Taken 11/11/2020 1817)  Self-Care Promotion:   independence encouraged   BADL personal objects within reach  Medication Review/Management: medications reviewed  Intervention: Promote Injury-Free Environment  Flowsheets (Taken 11/11/2020 1817)  Safety Promotion/Fall Prevention:   assistive device/personal item within reach   bed alarm set   Fall Risk reviewed with patient/family   nonskid shoes/socks when out of bed   side rails raised x 2   instructed to call staff for mobility   family to remain at bedside  Environmental Safety Modification:   assistive device/personal items within reach   clutter free environment maintained   room organization consistent   lighting adjusted     Problem: Adult Inpatient Plan of Care  Goal: Plan of Care Review  Outcome: Ongoing, Progressing  Goal: Patient-Specific Goal (Individualization)  Outcome: Ongoing, Progressing  Goal: Absence of Hospital-Acquired Illness or Injury  Outcome: Ongoing, Progressing  Intervention: Identify and Manage Fall Risk  Flowsheets (Taken 11/11/2020 1817)  Safety Promotion/Fall Prevention:   assistive device/personal item within reach   bed alarm set   Fall Risk reviewed with patient/family   nonskid shoes/socks when out of bed   side rails raised x 2   instructed to call staff for mobility   family to remain at bedside  Intervention: Prevent VTE (venous thromboembolism)  Flowsheets (Taken 11/11/2020 1817)  VTE Prevention/Management:   ambulation promoted   bleeding precautions maintained   bleeding risk assessed  Goal: Optimal Comfort and Wellbeing  Outcome: Ongoing, Progressing  Goal: Readiness for Transition of Care  Outcome: Ongoing, Progressing  Goal: Rounds/Family Conference  Outcome: Ongoing, Progressing

## 2020-11-12 NOTE — NURSING
Pt. Discharged home via walking to [place] private vehicle. Dc tele and Iv. PT. Has all personal belongings and discharge paper work. Pt. ANNABELLES

## 2020-11-13 ENCOUNTER — TELEPHONE (OUTPATIENT)
Dept: CARDIOLOGY | Facility: CLINIC | Age: 65
End: 2020-11-13

## 2020-11-13 NOTE — TELEPHONE ENCOUNTER
----- Message from Olivia Juarez sent at 11/13/2020 11:14 AM CST -----  Regarding: follow up   # please call patient [phone] to schedule follow up appt patient was discharged  11/12

## 2020-11-16 ENCOUNTER — TELEPHONE (OUTPATIENT)
Dept: CARDIOLOGY | Facility: CLINIC | Age: 65
End: 2020-11-16

## 2020-11-16 NOTE — TELEPHONE ENCOUNTER
----- Message from Olivia Juarez sent at 11/16/2020  9:28 AM CST -----  Regarding: follow up appt   please call patient [phone] to schedule follow up appt patient was discharged  11/12

## 2020-11-19 ENCOUNTER — OFFICE VISIT (OUTPATIENT)
Dept: CARDIOLOGY | Facility: CLINIC | Age: 65
End: 2020-11-19
Payer: MEDICARE

## 2020-11-19 ENCOUNTER — OFFICE VISIT (OUTPATIENT)
Dept: URGENT CARE | Facility: CLINIC | Age: 65
End: 2020-11-19
Payer: MEDICARE

## 2020-11-19 VITALS
HEIGHT: 64 IN | DIASTOLIC BLOOD PRESSURE: 84 MMHG | SYSTOLIC BLOOD PRESSURE: 149 MMHG | BODY MASS INDEX: 25.44 KG/M2 | WEIGHT: 149 LBS | OXYGEN SATURATION: 97 % | RESPIRATION RATE: 17 BRPM | TEMPERATURE: 99 F | HEART RATE: 75 BPM

## 2020-11-19 VITALS
WEIGHT: 149 LBS | HEIGHT: 64 IN | RESPIRATION RATE: 16 BRPM | HEART RATE: 73 BPM | BODY MASS INDEX: 25.44 KG/M2 | OXYGEN SATURATION: 98 % | SYSTOLIC BLOOD PRESSURE: 148 MMHG | DIASTOLIC BLOOD PRESSURE: 82 MMHG

## 2020-11-19 DIAGNOSIS — J02.9 SORE THROAT: ICD-10-CM

## 2020-11-19 DIAGNOSIS — E78.2 MIXED HYPERLIPIDEMIA: ICD-10-CM

## 2020-11-19 DIAGNOSIS — I47.10 PAROXYSMAL SVT (SUPRAVENTRICULAR TACHYCARDIA): Primary | ICD-10-CM

## 2020-11-19 DIAGNOSIS — R09.82 PND (POST-NASAL DRIP): ICD-10-CM

## 2020-11-19 DIAGNOSIS — I10 ESSENTIAL HYPERTENSION: ICD-10-CM

## 2020-11-19 DIAGNOSIS — E87.6 HYPOKALEMIA: ICD-10-CM

## 2020-11-19 DIAGNOSIS — R05.9 COUGH: Primary | ICD-10-CM

## 2020-11-19 DIAGNOSIS — R00.2 PALPITATIONS: ICD-10-CM

## 2020-11-19 LAB
CTP QC/QA: YES
SARS-COV-2 RDRP RESP QL NAA+PROBE: NEGATIVE

## 2020-11-19 PROCEDURE — U0002 COVID-19 LAB TEST NON-CDC: HCPCS | Mod: QW,S$GLB,, | Performed by: NURSE PRACTITIONER

## 2020-11-19 PROCEDURE — 99203 OFFICE O/P NEW LOW 30 MIN: CPT | Mod: S$GLB,,, | Performed by: NURSE PRACTITIONER

## 2020-11-19 PROCEDURE — 3288F FALL RISK ASSESSMENT DOCD: CPT | Mod: CPTII,S$GLB,, | Performed by: INTERNAL MEDICINE

## 2020-11-19 PROCEDURE — 99215 PR OFFICE/OUTPT VISIT, EST, LEVL V, 40-54 MIN: ICD-10-PCS | Mod: S$GLB,,, | Performed by: INTERNAL MEDICINE

## 2020-11-19 PROCEDURE — 99215 OFFICE O/P EST HI 40 MIN: CPT | Mod: S$GLB,,, | Performed by: INTERNAL MEDICINE

## 2020-11-19 PROCEDURE — 3008F PR BODY MASS INDEX (BMI) DOCUMENTED: ICD-10-PCS | Mod: CPTII,S$GLB,, | Performed by: INTERNAL MEDICINE

## 2020-11-19 PROCEDURE — 99203 PR OFFICE/OUTPT VISIT, NEW, LEVL III, 30-44 MIN: ICD-10-PCS | Mod: S$GLB,,, | Performed by: NURSE PRACTITIONER

## 2020-11-19 PROCEDURE — U0002: ICD-10-PCS | Mod: QW,S$GLB,, | Performed by: NURSE PRACTITIONER

## 2020-11-19 PROCEDURE — 3008F BODY MASS INDEX DOCD: CPT | Mod: CPTII,S$GLB,, | Performed by: NURSE PRACTITIONER

## 2020-11-19 PROCEDURE — 1101F PR PT FALLS ASSESS DOC 0-1 FALLS W/OUT INJ PAST YR: ICD-10-PCS | Mod: CPTII,S$GLB,, | Performed by: INTERNAL MEDICINE

## 2020-11-19 PROCEDURE — 3079F DIAST BP 80-89 MM HG: CPT | Mod: CPTII,S$GLB,, | Performed by: INTERNAL MEDICINE

## 2020-11-19 PROCEDURE — 1101F PT FALLS ASSESS-DOCD LE1/YR: CPT | Mod: CPTII,S$GLB,, | Performed by: INTERNAL MEDICINE

## 2020-11-19 PROCEDURE — 3079F PR MOST RECENT DIASTOLIC BLOOD PRESSURE 80-89 MM HG: ICD-10-PCS | Mod: CPTII,S$GLB,, | Performed by: INTERNAL MEDICINE

## 2020-11-19 PROCEDURE — 1126F PR PAIN SEVERITY QUANTIFIED, NO PAIN PRESENT: ICD-10-PCS | Mod: S$GLB,,, | Performed by: INTERNAL MEDICINE

## 2020-11-19 PROCEDURE — 3077F SYST BP >= 140 MM HG: CPT | Mod: CPTII,S$GLB,, | Performed by: INTERNAL MEDICINE

## 2020-11-19 PROCEDURE — 3008F PR BODY MASS INDEX (BMI) DOCUMENTED: ICD-10-PCS | Mod: CPTII,S$GLB,, | Performed by: NURSE PRACTITIONER

## 2020-11-19 PROCEDURE — 3288F PR FALLS RISK ASSESSMENT DOCUMENTED: ICD-10-PCS | Mod: CPTII,S$GLB,, | Performed by: INTERNAL MEDICINE

## 2020-11-19 PROCEDURE — 3077F PR MOST RECENT SYSTOLIC BLOOD PRESSURE >= 140 MM HG: ICD-10-PCS | Mod: CPTII,S$GLB,, | Performed by: INTERNAL MEDICINE

## 2020-11-19 PROCEDURE — 3008F BODY MASS INDEX DOCD: CPT | Mod: CPTII,S$GLB,, | Performed by: INTERNAL MEDICINE

## 2020-11-19 PROCEDURE — 1126F AMNT PAIN NOTED NONE PRSNT: CPT | Mod: S$GLB,,, | Performed by: INTERNAL MEDICINE

## 2020-11-19 RX ORDER — AZELASTINE 1 MG/ML
1 SPRAY, METERED NASAL 2 TIMES DAILY
Qty: 30 ML | Refills: 0 | Status: SHIPPED | OUTPATIENT
Start: 2020-11-19 | End: 2023-01-17

## 2020-11-19 RX ORDER — INFLUENZA A VIRUS A/MICHIGAN/45/2015 X-275 (H1N1) ANTIGEN (FORMALDEHYDE INACTIVATED), INFLUENZA A VIRUS A/SINGAPORE/INFIMH-16-0019/2016 IVR-186 (H3N2) ANTIGEN (FORMALDEHYDE INACTIVATED), INFLUENZA B VIRUS B/PHUKET/3073/2013 ANTIGEN (FORMALDEHYDE INACTIVATED), AND INFLUENZA B VIRUS B/MARYLAND/15/2016 BX-69A ANTIGEN (FORMALDEHYDE INACTIVATED) 60; 60; 60; 60 UG/.7ML; UG/.7ML; UG/.7ML; UG/.7ML
INJECTION, SUSPENSION INTRAMUSCULAR
COMMUNITY
Start: 2020-08-13

## 2020-11-19 NOTE — PROGRESS NOTES
"Subjective:       Patient ID: Marga Pak is a 65 y.o. female.    Vitals:  height is 5' 4" (1.626 m) and weight is 67.6 kg (149 lb). Her temperature is 98.7 °F (37.1 °C). Her blood pressure is 149/84 (abnormal) and her pulse is 75. Her respiration is 17 and oxygen saturation is 97%.     Chief Complaint: Sore Throat    Pt presents to clinic today with c/o fatigue, sore throat, nausea, and slight headache since yesterday. She would like to be tested for COVID-19 today. She denies any sick contacts or known exposure to COVID-19.    Sore Throat   This is a new problem. The current episode started in the past 7 days. The problem has been gradually worsening. Neither side of throat is experiencing more pain than the other. There has been no fever. The pain is at a severity of 5/10. The pain is moderate. Associated symptoms include congestion, coughing and headaches. Pertinent negatives include no abdominal pain, diarrhea, drooling, ear discharge, ear pain, hoarse voice, plugged ear sensation, neck pain, shortness of breath, stridor, swollen glands, trouble swallowing or vomiting. She has had no exposure to strep or mono. She has tried nothing for the symptoms. The treatment provided no relief.       Constitution: Negative for chills, fatigue and fever.   HENT: Positive for congestion and sore throat. Negative for ear pain, ear discharge, drooling and trouble swallowing.    Neck: Negative for neck pain and painful lymph nodes.   Cardiovascular: Negative for chest pain and leg swelling.   Eyes: Negative for double vision and blurred vision.   Respiratory: Positive for cough. Negative for shortness of breath and stridor.    Gastrointestinal: Negative for abdominal pain, nausea, vomiting and diarrhea.   Genitourinary: Negative for dysuria, frequency, urgency and history of kidney stones.   Musculoskeletal: Negative for joint pain, joint swelling, muscle cramps and muscle ache.   Skin: Negative for color change, pale, " rash and bruising.   Allergic/Immunologic: Negative for seasonal allergies.   Neurological: Positive for headaches. Negative for dizziness, history of vertigo, light-headedness and passing out.   Hematologic/Lymphatic: Negative for swollen lymph nodes.   Psychiatric/Behavioral: Negative for nervous/anxious, sleep disturbance and depression. The patient is not nervous/anxious.        Objective:      Physical Exam   Constitutional: She is oriented to person, place, and time. She appears well-developed. She is cooperative.  Non-toxic appearance. She does not appear ill. No distress.   HENT:   Head: Normocephalic and atraumatic.   Ears:   Right Ear: Hearing, tympanic membrane, external ear and ear canal normal.   Left Ear: Hearing, tympanic membrane, external ear and ear canal normal.   Nose: Mucosal edema present. No rhinorrhea, purulent discharge or nasal deformity. No epistaxis. Right sinus exhibits no maxillary sinus tenderness and no frontal sinus tenderness. Left sinus exhibits no maxillary sinus tenderness and no frontal sinus tenderness.   Mouth/Throat: Uvula is midline, oropharynx is clear and moist and mucous membranes are normal. No trismus in the jaw. Normal dentition. No uvula swelling. Cobblestoning present. No oropharyngeal exudate, posterior oropharyngeal edema or posterior oropharyngeal erythema. No tonsillar exudate.   Eyes: Conjunctivae and lids are normal. No scleral icterus.   Neck: Trachea normal, full passive range of motion without pain and phonation normal. Neck supple. No neck rigidity. No edema and no erythema present.   Cardiovascular: Normal rate, regular rhythm, normal heart sounds and normal pulses.   Pulmonary/Chest: Effort normal and breath sounds normal. No respiratory distress. She has no decreased breath sounds. She has no rhonchi.   Abdominal: Normal appearance.   Musculoskeletal: Normal range of motion.         General: No deformity.   Neurological: She is alert and oriented to  person, place, and time. She exhibits normal muscle tone. Coordination normal.   Skin: Skin is warm, dry, intact, not diaphoretic and not pale. Psychiatric: Her speech is normal and behavior is normal. Judgment and thought content normal.   Nursing note and vitals reviewed.        Results for orders placed or performed in visit on 11/19/20   POCT COVID-19 Rapid Screening   Result Value Ref Range    POC Rapid COVID Negative Negative     Acceptable Yes      Assessment:       1. Cough    2. Sore throat    3. PND (post-nasal drip)        Plan:         Cough  -     POCT COVID-19 Rapid Screening    Sore throat  -     POCT COVID-19 Rapid Screening    PND (post-nasal drip)  -     POCT COVID-19 Rapid Screening  -     azelastine (ASTELIN) 137 mcg (0.1 %) nasal spray; 1 spray (137 mcg total) by Nasal route 2 (two) times daily.  Dispense: 30 mL; Refill: 0    Discussed negative COVID-19 test result, diagnosis, and treatment plan today. All applicable EKG, medical records, labs, imaging reviewed in Epic and discussed with patient. Instructed to follow up with PCP or go to ER if symptoms fail to improve or worsen. Pt verbalizes understanding.       Patient Instructions   PLEASE READ YOUR DISCHARGE INSTRUCTIONS ENTIRELY AS IT CONTAINS IMPORTANT INFORMATION.    Use Astelin nasal spray as prescribed for nasal congestion/post nasal drip.    Please drink plenty of fluids.    Please get plenty of rest.    Please return here or go to the Emergency Department for any concerns or worsening of condition.    Please take an over the counter antihistamine medication (allegra/Claritin/Zyrtec) of your choice as directed.    Try an over the counter decongestant like Mucinex.    If not allergic, please take over the counter Tylenol (Acetaminophen) and/or Motrin (Ibuprofen) as directed for control of pain and/or fever.  Please follow up with your primary care doctor or specialist as needed.    Sore throat recommendations: Warm  fluids, warm salt water gargles, throat lozenges, tea, honey, soup, rest, hydration.    Use over the counter flonase: one spray each nostril twice daily OR two sprays each nostril once daily.     If you  smoke, please stop smoking.      Please return or see your primary care doctor if you develop new or worsening symptoms.     Please arrange follow up with your primary medical clinic as soon as possible. You must understand that you've received an Urgent Care treatment only and that you may be released before all of your medical problems are known or treated. You, the patient, will arrange for follow up as instructed. If your symptoms worsen or fail to improve you should go to the Emergency Room.

## 2020-11-19 NOTE — LETTER
[address] [address], [address] ? [place] [address] ? Phone [phone] ? Fax [phone]           Return to Work/School    Patient: Marga Pak  YOB: 1955   Date: 11/19/2020      To Whom It May Concern:     Marga Pak was in contact with/seen in my office on 11/19/2020. COVID-19 is present in our communities across the state. Not all patients are eligible or appropriate to be tested. In this situation, your employee meets the following criteria:     Marga Pak has met the criteria for COVID-19 testing and has a NEGATIVE result.    If patient has been tested for COVID19:    --IF Test results are NEGATIVE and NO known high risk exposure to covid-19 virus, can exclude from work/school until:  o MINIMUM OF 24 hours fever-free without the use of fever-reducing medications AND  o Improvement in symptoms (e.g. cough, shortness of breath, fatigue, GI symptoms, etc)     --IF YOU ARE BEING TESTED BECAUSE OF A HIGH RISK EXPOSURE, which the CDC defines as direct contact 6 feet or less for >15 minutes with a known positive person, you should follow CDC guidelines as well as your employer/school protocols for safely returning to work/school. Please be aware that there are False Negative possibilities with testing and you should return to work based upon CDC guidelines, not simply a negative result, unless your employer/school has a different RTW protocol/guidance for you.      If you have any questions or concerns, or if I can be of further assistance, please do not hesitate to contact me.     Sincerely,      Cassandra Redd NP

## 2020-11-19 NOTE — PATIENT INSTRUCTIONS
PLEASE READ YOUR DISCHARGE INSTRUCTIONS ENTIRELY AS IT CONTAINS IMPORTANT INFORMATION.    Use Astelin nasal spray as prescribed for nasal congestion/post nasal drip.    Please drink plenty of fluids.    Please get plenty of rest.    Please return here or go to the Emergency Department for any concerns or worsening of condition.    Please take an over the counter antihistamine medication (allegra/Claritin/Zyrtec) of your choice as directed.    Try an over the counter decongestant like Mucinex.    If not allergic, please take over the counter Tylenol (Acetaminophen) and/or Motrin (Ibuprofen) as directed for control of pain and/or fever.  Please follow up with your primary care doctor or specialist as needed.    Sore throat recommendations: Warm fluids, warm salt water gargles, throat lozenges, tea, honey, soup, rest, hydration.    Use over the counter flonase: one spray each nostril twice daily OR two sprays each nostril once daily.     If you  smoke, please stop smoking.      Please return or see your primary care doctor if you develop new or worsening symptoms.     Please arrange follow up with your primary medical clinic as soon as possible. You must understand that you've received an Urgent Care treatment only and that you may be released before all of your medical problems are known or treated. You, the patient, will arrange for follow up as instructed. If your symptoms worsen or fail to improve you should go to the Emergency Room.

## 2020-11-19 NOTE — PROGRESS NOTES
Subjective:    Patient ID:  Marga Pak is a 65 y.o. female who presents  Chief Complaint   Patient presents with    Hospital Follow Up    Palpitations    Dizziness       HPI:  Ms Marga Pak is a 65 y.o. female is here for initial follow-up.  Patient has been recently been at the hospital way she has been having recurrent palpitations fatigue and tiredness.  And in the hospital essentially she ruled out for any myocardial infarction and she underwent placement of the loop recorder and she was discharged home.  Patient had intermittent palpitations and she had pressed the button once or twice.  She has not had the fatigue and tiredness and weakness like she had before but she still had intermittent palpitations P    Review of patient's allergies indicates:   Allergen Reactions    Bee sting [allergen ext-venom-honey bee] Swelling    Iodine and iodide containing products Palpitations       Past Medical History:   Diagnosis Date    Colon torsion     Coronary artery disease     CAD, pt states nild MI    Gastroparesis     GERD (gastroesophageal reflux disease)     Hyperlipidemia     Hypertension     Sciatica      Past Surgical History:   Procedure Laterality Date    ABDOMINAL SURGERY      APPENDECTOMY      COLON SURGERY      due to torsion, clipped adhesions    COLONOSCOPY  08/13/2014    JUNIOR.   One 1 mm polyp in the distal ascending colon.  Nonneoplastic colonic mucosa with reactive/regenerative changes.  Nodule (inverted stump?) at the appendiceal orifice.  Nonneoplastic colonic mucosa with prominent lymphoid aggregates.    COLONOSCOPY N/A 10/12/2020    Procedure: COLONOSCOPY;  Surgeon: Robb Kwong Jr., MD;  Location: [facility];  Service: Endoscopy;  Laterality: N/A;    ESOPHAGOGASTRODUODENOSCOPY      HYSTERECTOMY      INSERTION OF IMPLANTABLE LOOP RECORDER Left 11/12/2020    Procedure: Insertion, Implantable Loop Recorder;  Surgeon: Madan Benitez MD;  Location[facility]  CATH[facility]EP LAB;  Service: Cardiology;  Laterality: Left;    OOPHORECTOMY       Social History     Tobacco Use    Smoking status: Never Smoker    Smokeless tobacco: Never Used   Substance Use Topics    Alcohol use: No     Frequency: Monthly or less     Drinks per session: 1 or 2     Binge frequency: Never    Drug use: No     Family History   Problem Relation Age of Onset    Breast cancer Sister 58    Ovarian cancer Maternal Grandmother     Pacemaker/defibrilator Mother     Hypertension Mother     No Known Problems Father         Review of Systems:   Constitution: Negative for diaphoresis and fever.   HEENT: Negative for nosebleeds.    Cardiovascular: Negative for chest pain       No dyspnea on exertion       No leg swelling        No palpitations  Respiratory: Negative for shortness of breath and wheezing.    Hematologic/Lymphatic: Negative for bleeding problem. Does not bruise/bleed easily.   Skin: Negative for color change and rash.   Musculoskeletal: Negative for falls and myalgias.   Gastrointestinal: Negative for hematemesis and hematochezia.   Genitourinary: Negative for hematuria.   Neurological: Negative for dizziness and light-headedness.   Psychiatric/Behavioral: Negative for altered mental status and memory loss.          Objective:        Vitals:    11/19/20 1240   BP: (!) 148/82   Pulse: 73   Resp: 16       Lab Results   Component Value Date    WBC 6.89 11/12/2020    HGB 12.0 11/12/2020    HCT 36.6 (L) 11/12/2020     11/12/2020    CHOL 237 (H) 11/14/2017    TRIG 78 11/14/2017    HDL 75 11/14/2017    ALT 11 11/12/2020    AST 19 11/12/2020     11/12/2020    K 4.0 11/12/2020     11/12/2020    CREATININE 0.7 11/12/2020    BUN 19 11/12/2020    CO2 29 11/12/2020    TSH 1.630 11/10/2020    INR 1.1 11/12/2020        ECHOCARDIOGRAM RESULTS  Results for orders placed during the hospital encounter of 11/10/20   Stress Echo Which stress agent will be used? Treadmill Exercise; Color Flow  Doppler? No    Narrative · The test was stopped because the patient experienced arrhythmia.   Arrhythmia identified as supraventricular tachycardia.  · The ECG portion of this study is positive for myocardial ischemia when   the patient went into SVT. When patient converted back to normal sinus   rhythm she was noted to have nonspecifc upsloping ST depression.  · The stress echo portion of this study is negative for myocardial   ischemia.            CURRENT/PREVIOUS VISIT EKG  Results for orders placed or performed during the hospital encounter of 11/10/20   EKG 12-lead    Collection Time: 11/10/20  2:44 PM    Narrative    Test Reason : R07.9,    Vent. Rate : 088 BPM     Atrial Rate : 088 BPM     P-R Int : 182 ms          QRS Dur : 076 ms      QT Int : 362 ms       P-R-T Axes : 060 -24 052 degrees     QTc Int : 438 ms    Normal sinus rhythm  Septal infarct (cited on or before 16-FEB-2020)  Abnormal ECG Leftward axis  When compared with ECG of 16-FEB-2020 02:27,  No significant change was found  Confirmed by Madan Benitez MD ([id number]) on 11/11/2020 8:29:36 AM    Referred By: [email or web address]   SELF           Confirmed By:Madan Benitez MD     No procedure found.   Results for orders placed during the hospital encounter of 02/16/20   Treadmill Stress Test    Narrative   The EKG portion of this study is negative for ischemia.    The patient reported no chest pain during the stress test.    Arrhythmias during stress: occasional PACs, atrial fibrillation.    The exercise capacity was above average.           Physical Exam:  CONSTITUTIONAL: No fever, no chills  HEENT: Normocephalic, atraumatic,pupils reactive to light                 NECK:  No JVD no carotid bruit  CVS: S1S2+, RRR, no murmurs,   LUNGS: Clear  ABDOMEN: Soft, NT, BS+  EXTREMITIES: No cyanosis, edema  : No butler catheter  NEURO: AAO X 3  PSY: Normal affect      Medication List with Changes/Refills   New Medications    AZELASTINE (ASTELIN) 137 MCG (0.1 %) NASAL  SPRAY    1 spray (137 mcg total) by Nasal route 2 (two) times daily.   Current Medications    AMLODIPINE (NORVASC) 5 MG TABLET    Take 1 tablet (5 mg total) by mouth every evening.    ASPIRIN (ASPIR-81 ORAL)    Take 1 tablet by mouth once daily.    ATORVASTATIN (LIPITOR) 20 MG TABLET    Take 1 tablet (20 mg total) by mouth once daily.    CETIRIZINE (ZYRTEC) 10 MG TABLET    Take 10 mg by mouth once daily.    ESTROGENS, CONJUGATED, (PREMARIN) 0.9 MG TAB    TAKE 1 TABLET(0.9 MG) BY MOUTH EVERY DAY    FAMOTIDINE (PEPCID) 20 MG TABLET    Take 1 tablet (20 mg total) by mouth 2 (two) times daily.    FLUZONE HIGHDOSE QUAD 20-21  MCG/0.7 ML SYRG    ADM 0.7ML IM UTD    HYDROCODONE-ACETAMINOPHEN (NORCO) 5-325 MG PER TABLET    Take 1 tablet by mouth every 8 (eight) hours as needed.    LEVOCETIRIZINE (XYZAL) 5 MG TABLET    TK 1 T PO  QAM PRF SINUS ALLERGY OR DRIP    METOPROLOL SUCCINATE (TOPROL-XL) 50 MG 24 HR TABLET    Take 1 tablet (50 mg total) by mouth once daily. TAKE 1 TABLET(50 MG) BY MOUTH EVERY DAY    OMEGA-3 FATTY ACIDS/FISH OIL (FISH OIL-OMEGA-3 FATTY ACIDS) 300-1,000 MG CAPSULE    Take 1 capsule by mouth once daily. otc    VITAMIN D (VITAMIN D3) 1000 UNITS TAB    Take 1,000 Units by mouth once daily. OTC             Assessment:       1. Paroxysmal SVT (supraventricular tachycardia)    2. Palpitations    3. Essential hypertension    4. Mixed hyperlipidemia    5. Hypokalemia         Plan:   1.  The loop recorder site has been checked and it is good no hematoma no bleeding no past or infection.  And the dressing around it was removed she just has the Steri-Strips.  2.  Patient has been having recurrent bouts of palpitations she is currently on metoprolol succinate 50 mg p.o. q.day discussed with patient to cardiorenal to to have sent take half in the morning half in the evening.  This would cover all the 24 hr.    3.  We will get the loop recorder evaluated.  4.  If it is atrial fibrillation or paroxysmal atrial  fibrillation that and she certainly would need anticoagulation with Eliquis.  She would be taking it 5 mg p.o. b.i.d..  There is a risk of bleeding including intracranial bleed or spontaneous bleed.  And patient understands that.  5.  If she has been having just supraventricular tachycardia with AV jam reentry tachycardia then she would probably need EP study and possible ablation of the SVT.  It sounds more like she has AVNRT since she has been having this palpitations childhood.  6.  Patient would need interrogation of her loop recorder and I will see her in the office after that.    Her loop recorder was checked remotely [employer] and it appears to be showing tachycardia episode which appears to be a short run of supraventricular tachycardia.  And she had only 1 episode. Patient continue her metoprolol.      Problem List Items Addressed This Visit        Cardiac/Vascular    Essential hypertension    Hyperlipidemia    Palpitations    Paroxysmal SVT (supraventricular tachycardia) - Primary       Renal/    Hypokalemia          Follow up in about 4 months (around 3/19/2021).

## 2020-11-21 LAB
AORTIC ROOT ANNULUS: 2.48 CM
AORTIC VALVE CUSP SEPERATION: 1.85 CM
AV INDEX (PROSTH): 1.02
AV MEAN GRADIENT: 3 MMHG
AV PEAK GRADIENT: 6 MMHG
AV VALVE AREA: 3.15 CM2
AV VELOCITY RATIO: 91.18
BSA FOR ECHO PROCEDURE: 1.75 M2
CV ECHO LV RWT: 0.55 CM
DOP CALC AO PEAK VEL: 1.26 M/S
DOP CALC AO VTI: 28.91 CM
DOP CALC LVOT AREA: 3.1 CM2
DOP CALC LVOT DIAMETER: 1.98 CM
DOP CALC LVOT PEAK VEL: 114.89 M/S
DOP CALC LVOT STROKE VOLUME: 91.03 CM3
DOP CALCLVOT PEAK VEL VTI: 29.58 CM
E WAVE DECELERATION TIME: 215.48 MSEC
E/A RATIO: 1.34
E/E' RATIO: 10.89 M/S
ECHO LV POSTERIOR WALL: 1.01 CM (ref 0.6–1.1)
FRACTIONAL SHORTENING: 29 % (ref 28–44)
INTERVENTRICULAR SEPTUM: 1.02 CM (ref 0.6–1.1)
IVRT: 91.17 MSEC
LEFT ATRIUM SIZE: 3.18 CM
LEFT INTERNAL DIMENSION IN SYSTOLE: 2.58 CM (ref 2.1–4)
LEFT VENTRICLE MASS INDEX: 65 G/M2
LEFT VENTRICULAR INTERNAL DIMENSION IN DIASTOLE: 3.65 CM (ref 3.5–6)
LEFT VENTRICULAR MASS: 112.61 G
LV LATERAL E/E' RATIO: 8.91 M/S
LV SEPTAL E/E' RATIO: 14 M/S
MV PEAK A VEL: 0.73 M/S
MV PEAK E VEL: 0.98 M/S
PISA TR MAX VEL: 2.69 M/S
PV PEAK VELOCITY: 81.89 CM/S
RA PRESSURE: 3 MMHG
RIGHT VENTRICULAR END-DIASTOLIC DIMENSION: 179 CM
TDI LATERAL: 0.11 M/S
TDI SEPTAL: 0.07 M/S
TDI: 0.09 M/S
TR MAX PG: 29 MMHG
TV REST PULMONARY ARTERY PRESSURE: 32 MMHG

## 2020-11-23 ENCOUNTER — PATIENT OUTREACH (OUTPATIENT)
Dept: OTHER | Facility: OTHER | Age: 65
End: 2020-11-23

## 2020-11-23 NOTE — PROGRESS NOTES
Digital Medicine: Health  Follow-Up    The history is provided by the patient.             Reason for review: Blood pressure not at goal        Topics Covered on Call: physical activity and Diet    Additional Follow-up details: Average blood pressure today is 141/80. Patient reports that she has been in and out of the hospital. A couple of weeks ago she started with heart palpitations and nausea. She got really faint and had someone take her to the hospital. They admitted her and couldn't find anything wrong. They put in a loop to monitor what's going on with her palpitation episodes. She said that her blood pressure went out of control at the time and was really high. She was released and cleared to go to [place] for [facility] and [place]. She will come back after Christmas.             Diet-no change to diet    No change to diet.  Patient reports eating or drinking the following: Patient denies changes to diet. She has still been eating healthy and watching sodium intake.       Physical Activity-Change      Additional physical activity details: She hasn't been able to get much exercise in because of her palpitation episodes. She doesn't have a follow up with her cardiologist until March.       Medication Adherence-Medication adherence was asssessed.        She has been taking her metoprolol as prescribed but increase her amlodipine to 2 tablets daily because her blood pressure was increasing. Will let pharmD know of this.   Substance, Sleep, Stress-Not assessed      Continue current diet/physical activity routine.       Addressed patient questions and patient has my contact information if needed prior to next outreach. Patient verbalizes understanding.             There are no preventive care reminders to display for this patient.      Last 5 Patient Entered Readings                                      Current 30 Day Average: 141/80     Recent Readings 11/23/2020 11/23/2020 11/22/2020 11/22/2020  11/22/2020    SBP (mmHg) 134 132 161 161 165    DBP (mmHg) 77 77 90 90 84    Pulse 66 64 72 72 68

## 2020-12-28 ENCOUNTER — HOSPITAL ENCOUNTER (OUTPATIENT)
Dept: CARDIOLOGY | Facility: CLINIC | Age: 65
Discharge: HOME | End: 2020-12-28
Attending: INTERNAL MEDICINE
Payer: MEDICARE

## 2020-12-28 ENCOUNTER — PATIENT OUTREACH (OUTPATIENT)
Dept: OTHER | Facility: OTHER | Age: 65
End: 2020-12-28

## 2020-12-28 DIAGNOSIS — I47.10 PAROXYSMAL SVT (SUPRAVENTRICULAR TACHYCARDIA): ICD-10-CM

## 2020-12-28 PROCEDURE — 93298 CARDIAC DEVICE CHECK - REMOTE: ICD-10-PCS | Mod: S$GLB,,, | Performed by: INTERNAL MEDICINE

## 2020-12-28 PROCEDURE — 93298 REM INTERROG DEV EVAL SCRMS: CPT | Mod: S$GLB,,, | Performed by: INTERNAL MEDICINE

## 2020-12-28 NOTE — PROGRESS NOTES
Digital Medicine: Health  Follow-Up    The history is provided by the patient.             Reason for review: Blood pressure not at goal        Topics Covered on Call: physical activity and Diet    Additional Follow-up details: Average blood pressure today is 139/73. She reports that she took a reading last week but when she looked up her log it says that her last reading was on the 16th. She will take another reading today. She has been doing well.     She has been getting some dizzy episodes. She discovered it has to be one of her vitamins. She plans to slowly add back in the vitamins she was taking to see if the dizziness starts again.             Diet-no change to diet    No change to diet.        Physical Activity-no change to routine  No change to exercise routine.       Additional physical activity details: She is starting back up her walking again.      Medication Adherence-Medication adherence was assessed.      Substance, Sleep, Stress-Not assessed      Continue current diet/physical activity routine.       Addressed patient questions and patient has my contact information if needed prior to next outreach. Patient verbalizes understanding.             There are no preventive care reminders to display for this patient.      Last 5 Patient Entered Readings                                      Current 30 Day Average: 139/73     Recent Readings 12/16/2020 12/16/2020 12/11/2020 12/5/2020 11/23/2020    SBP (mmHg) 137 138 141 138 134    DBP (mmHg) 68 76 70 76 77    Pulse 66 70 69 68 66

## 2021-01-13 ENCOUNTER — OFFICE VISIT (OUTPATIENT)
Dept: URGENT CARE | Facility: CLINIC | Age: 66
End: 2021-01-13
Payer: MEDICARE

## 2021-01-13 VITALS
BODY MASS INDEX: 25.44 KG/M2 | DIASTOLIC BLOOD PRESSURE: 76 MMHG | WEIGHT: 149 LBS | OXYGEN SATURATION: 97 % | HEART RATE: 70 BPM | HEIGHT: 64 IN | TEMPERATURE: 99 F | SYSTOLIC BLOOD PRESSURE: 140 MMHG

## 2021-01-13 DIAGNOSIS — J06.9 UPPER RESPIRATORY TRACT INFECTION, UNSPECIFIED TYPE: ICD-10-CM

## 2021-01-13 DIAGNOSIS — R09.89 RUNNY NOSE: ICD-10-CM

## 2021-01-13 DIAGNOSIS — R05.9 COUGH: Primary | ICD-10-CM

## 2021-01-13 DIAGNOSIS — Z03.818 ENCNTR FOR OBS FOR SUSP EXPSR TO OTH BIOLG AGENTS RULED OUT: ICD-10-CM

## 2021-01-13 LAB
CTP QC/QA: YES
SARS-COV-2 RDRP RESP QL NAA+PROBE: NEGATIVE

## 2021-01-13 PROCEDURE — U0002: ICD-10-PCS | Mod: QW,S$GLB,, | Performed by: INTERNAL MEDICINE

## 2021-01-13 PROCEDURE — 3008F PR BODY MASS INDEX (BMI) DOCUMENTED: ICD-10-PCS | Mod: CPTII,S$GLB,, | Performed by: INTERNAL MEDICINE

## 2021-01-13 PROCEDURE — U0002 COVID-19 LAB TEST NON-CDC: HCPCS | Mod: QW,S$GLB,, | Performed by: INTERNAL MEDICINE

## 2021-01-13 PROCEDURE — 99213 PR OFFICE/OUTPT VISIT, EST, LEVL III, 20-29 MIN: ICD-10-PCS | Mod: S$GLB,,, | Performed by: INTERNAL MEDICINE

## 2021-01-13 PROCEDURE — 3008F BODY MASS INDEX DOCD: CPT | Mod: CPTII,S$GLB,, | Performed by: INTERNAL MEDICINE

## 2021-01-13 PROCEDURE — 99213 OFFICE O/P EST LOW 20 MIN: CPT | Mod: S$GLB,,, | Performed by: INTERNAL MEDICINE

## 2021-01-13 RX ORDER — BENZONATATE 100 MG/1
100 CAPSULE ORAL 2 TIMES DAILY PRN
Qty: 14 CAPSULE | Refills: 0 | Status: SHIPPED | OUTPATIENT
Start: 2021-01-13 | End: 2021-01-20

## 2021-01-19 ENCOUNTER — HOSPITAL ENCOUNTER (OUTPATIENT)
Dept: RADIOLOGY | Facility: HOSPITAL | Age: 66
Discharge: HOME | End: 2021-01-19
Attending: SPECIALIST
Payer: MEDICARE

## 2021-01-19 ENCOUNTER — OFFICE VISIT (OUTPATIENT)
Dept: OBSTETRICS AND GYNECOLOGY | Facility: CLINIC | Age: 66
End: 2021-01-19
Payer: MEDICARE

## 2021-01-19 VITALS
SYSTOLIC BLOOD PRESSURE: 122 MMHG | HEIGHT: 64 IN | BODY MASS INDEX: 26.12 KG/M2 | RESPIRATION RATE: 18 BRPM | WEIGHT: 153 LBS | DIASTOLIC BLOOD PRESSURE: 78 MMHG

## 2021-01-19 DIAGNOSIS — Z00.00 PREVENTATIVE HEALTH CARE: ICD-10-CM

## 2021-01-19 DIAGNOSIS — Z12.31 ENCOUNTER FOR SCREENING MAMMOGRAM FOR BREAST CANCER: ICD-10-CM

## 2021-01-19 DIAGNOSIS — Z12.31 ENCOUNTER FOR SCREENING MAMMOGRAM FOR BREAST CANCER: Primary | ICD-10-CM

## 2021-01-19 PROCEDURE — 99999 PR PBB SHADOW E&M-EST. PATIENT-LVL IV: CPT | Mod: PBBFAC,,, | Performed by: SPECIALIST

## 2021-01-19 PROCEDURE — 3008F PR BODY MASS INDEX (BMI) DOCUMENTED: ICD-10-PCS | Mod: CPTII,S$GLB,, | Performed by: SPECIALIST

## 2021-01-19 PROCEDURE — 3078F PR MOST RECENT DIASTOLIC BLOOD PRESSURE < 80 MM HG: ICD-10-PCS | Mod: CPTII,S$GLB,, | Performed by: SPECIALIST

## 2021-01-19 PROCEDURE — 77067 SCR MAMMO BI INCL CAD: CPT | Mod: TC,PN

## 2021-01-19 PROCEDURE — 77067 MAMMO DIGITAL SCREENING BILAT WITH TOMO: ICD-10-PCS | Mod: 26,,, | Performed by: RADIOLOGY

## 2021-01-19 PROCEDURE — 77067 SCR MAMMO BI INCL CAD: CPT | Mod: 26,,, | Performed by: RADIOLOGY

## 2021-01-19 PROCEDURE — 1101F PR PT FALLS ASSESS DOC 0-1 FALLS W/OUT INJ PAST YR: ICD-10-PCS | Mod: CPTII,S$GLB,, | Performed by: SPECIALIST

## 2021-01-19 PROCEDURE — 3074F SYST BP LT 130 MM HG: CPT | Mod: CPTII,S$GLB,, | Performed by: SPECIALIST

## 2021-01-19 PROCEDURE — 3288F FALL RISK ASSESSMENT DOCD: CPT | Mod: CPTII,S$GLB,, | Performed by: SPECIALIST

## 2021-01-19 PROCEDURE — 3008F BODY MASS INDEX DOCD: CPT | Mod: CPTII,S$GLB,, | Performed by: SPECIALIST

## 2021-01-19 PROCEDURE — 3078F DIAST BP <80 MM HG: CPT | Mod: CPTII,S$GLB,, | Performed by: SPECIALIST

## 2021-01-19 PROCEDURE — 1101F PT FALLS ASSESS-DOCD LE1/YR: CPT | Mod: CPTII,S$GLB,, | Performed by: SPECIALIST

## 2021-01-19 PROCEDURE — 77063 BREAST TOMOSYNTHESIS BI: CPT | Mod: 26,,, | Performed by: RADIOLOGY

## 2021-01-19 PROCEDURE — 99999 PR PBB SHADOW E&M-EST. PATIENT-LVL IV: ICD-10-PCS | Mod: PBBFAC,,, | Performed by: SPECIALIST

## 2021-01-19 PROCEDURE — G0101 PR CA SCREEN;PELVIC/BREAST EXAM: ICD-10-PCS | Mod: S$GLB,,, | Performed by: SPECIALIST

## 2021-01-19 PROCEDURE — G0101 CA SCREEN;PELVIC/BREAST EXAM: HCPCS | Mod: S$GLB,,, | Performed by: SPECIALIST

## 2021-01-19 PROCEDURE — 77063 MAMMO DIGITAL SCREENING BILAT WITH TOMO: ICD-10-PCS | Mod: 26,,, | Performed by: RADIOLOGY

## 2021-01-19 PROCEDURE — 1126F AMNT PAIN NOTED NONE PRSNT: CPT | Mod: S$GLB,,, | Performed by: SPECIALIST

## 2021-01-19 PROCEDURE — 1126F PR PAIN SEVERITY QUANTIFIED, NO PAIN PRESENT: ICD-10-PCS | Mod: S$GLB,,, | Performed by: SPECIALIST

## 2021-01-19 PROCEDURE — 3074F PR MOST RECENT SYSTOLIC BLOOD PRESSURE < 130 MM HG: ICD-10-PCS | Mod: CPTII,S$GLB,, | Performed by: SPECIALIST

## 2021-01-19 PROCEDURE — 3288F PR FALLS RISK ASSESSMENT DOCUMENTED: ICD-10-PCS | Mod: CPTII,S$GLB,, | Performed by: SPECIALIST

## 2021-01-19 RX ORDER — GENTAMICIN SULFATE 3 MG/ML
SOLUTION/ DROPS OPHTHALMIC
COMMUNITY
Start: 2021-01-15 | End: 2023-01-17

## 2021-01-19 RX ORDER — DOXYCYCLINE HYCLATE 100 MG
100 TABLET ORAL 2 TIMES DAILY
COMMUNITY
Start: 2021-01-15 | End: 2023-01-17

## 2021-01-19 RX ORDER — PROMETHAZINE HYDROCHLORIDE AND DEXTROMETHORPHAN HYDROBROMIDE 6.25; 15 MG/5ML; MG/5ML
SYRUP ORAL
COMMUNITY
Start: 2021-01-15 | End: 2023-01-17

## 2021-02-09 DIAGNOSIS — I47.10 PAROXYSMAL SVT (SUPRAVENTRICULAR TACHYCARDIA): Primary | ICD-10-CM

## 2021-02-10 ENCOUNTER — HOSPITAL ENCOUNTER (OUTPATIENT)
Dept: CARDIOLOGY | Facility: CLINIC | Age: 66
Discharge: HOME | End: 2021-02-10
Attending: INTERNAL MEDICINE
Payer: MEDICARE

## 2021-02-10 DIAGNOSIS — I47.10 PAROXYSMAL SVT (SUPRAVENTRICULAR TACHYCARDIA): ICD-10-CM

## 2021-02-10 DIAGNOSIS — I47.10 PAROXYSMAL SVT (SUPRAVENTRICULAR TACHYCARDIA): Primary | ICD-10-CM

## 2021-02-10 PROCEDURE — 93298 CARDIAC DEVICE CHECK - REMOTE: ICD-10-PCS | Mod: S$GLB,,, | Performed by: INTERNAL MEDICINE

## 2021-02-10 PROCEDURE — 93298 REM INTERROG DEV EVAL SCRMS: CPT | Mod: S$GLB,,, | Performed by: INTERNAL MEDICINE

## 2021-02-12 DIAGNOSIS — I47.10 PAROXYSMAL SVT (SUPRAVENTRICULAR TACHYCARDIA): Primary | ICD-10-CM

## 2021-02-24 ENCOUNTER — PATIENT MESSAGE (OUTPATIENT)
Dept: ADMINISTRATIVE | Facility: OTHER | Age: 66
End: 2021-02-24

## 2021-02-24 DIAGNOSIS — I47.10 PAROXYSMAL SVT (SUPRAVENTRICULAR TACHYCARDIA): Primary | ICD-10-CM

## 2021-03-26 ENCOUNTER — HOSPITAL ENCOUNTER (OUTPATIENT)
Dept: CARDIOLOGY | Facility: CLINIC | Age: 66
Discharge: HOME | End: 2021-03-26
Attending: INTERNAL MEDICINE
Payer: MEDICARE

## 2021-03-26 DIAGNOSIS — I47.10 PAROXYSMAL SVT (SUPRAVENTRICULAR TACHYCARDIA): ICD-10-CM

## 2021-03-26 PROCEDURE — 93298 CARDIAC DEVICE CHECK - REMOTE: ICD-10-PCS | Mod: S$GLB,,, | Performed by: INTERNAL MEDICINE

## 2021-03-26 PROCEDURE — 93298 REM INTERROG DEV EVAL SCRMS: CPT | Mod: S$GLB,,, | Performed by: INTERNAL MEDICINE

## 2021-03-30 DIAGNOSIS — I47.10 PAROXYSMAL SVT (SUPRAVENTRICULAR TACHYCARDIA): Primary | ICD-10-CM

## 2021-05-06 ENCOUNTER — PATIENT MESSAGE (OUTPATIENT)
Dept: RESEARCH | Facility: HOSPITAL | Age: 66
End: 2021-05-06

## 2021-05-11 ENCOUNTER — HOSPITAL ENCOUNTER (OUTPATIENT)
Dept: CARDIOLOGY | Facility: CLINIC | Age: 66
Discharge: HOME | End: 2021-05-11
Attending: INTERNAL MEDICINE
Payer: MEDICARE

## 2021-05-11 DIAGNOSIS — I47.10 PAROXYSMAL SVT (SUPRAVENTRICULAR TACHYCARDIA): ICD-10-CM

## 2021-05-11 PROCEDURE — 93298 CARDIAC DEVICE CHECK - REMOTE: ICD-10-PCS | Mod: S$GLB,,, | Performed by: INTERNAL MEDICINE

## 2021-05-11 PROCEDURE — 93298 REM INTERROG DEV EVAL SCRMS: CPT | Mod: S$GLB,,, | Performed by: INTERNAL MEDICINE

## 2021-05-19 ENCOUNTER — PATIENT MESSAGE (OUTPATIENT)
Dept: ADMINISTRATIVE | Facility: OTHER | Age: 66
End: 2021-05-19

## 2021-06-25 ENCOUNTER — HOSPITAL ENCOUNTER (OUTPATIENT)
Dept: CARDIOLOGY | Facility: CLINIC | Age: 66
Discharge: HOME | End: 2021-06-25
Attending: INTERNAL MEDICINE
Payer: MEDICARE

## 2021-06-25 DIAGNOSIS — I47.10 PAROXYSMAL SVT (SUPRAVENTRICULAR TACHYCARDIA): ICD-10-CM

## 2021-06-25 PROCEDURE — 93298 CARDIAC DEVICE CHECK - REMOTE: ICD-10-PCS | Mod: S$GLB,,, | Performed by: INTERNAL MEDICINE

## 2021-06-25 PROCEDURE — 93298 REM INTERROG DEV EVAL SCRMS: CPT | Mod: S$GLB,,, | Performed by: INTERNAL MEDICINE

## 2021-08-09 ENCOUNTER — HOSPITAL ENCOUNTER (OUTPATIENT)
Dept: CARDIOLOGY | Facility: CLINIC | Age: 66
Discharge: HOME | End: 2021-08-09
Attending: INTERNAL MEDICINE
Payer: MEDICARE

## 2021-08-09 DIAGNOSIS — I47.10 PAROXYSMAL SVT (SUPRAVENTRICULAR TACHYCARDIA): ICD-10-CM

## 2021-08-09 PROCEDURE — 93298 REM INTERROG DEV EVAL SCRMS: CPT | Mod: S$GLB,,, | Performed by: INTERNAL MEDICINE

## 2021-08-09 PROCEDURE — 93298 CARDIAC DEVICE CHECK - REMOTE: ICD-10-PCS | Mod: S$GLB,,, | Performed by: INTERNAL MEDICINE

## 2021-08-16 ENCOUNTER — TELEPHONE (OUTPATIENT)
Dept: CARDIOLOGY | Facility: HOSPITAL | Age: 66
End: 2021-08-16

## 2021-08-16 ENCOUNTER — OFFICE VISIT (OUTPATIENT)
Dept: CARDIOLOGY | Facility: CLINIC | Age: 66
End: 2021-08-16
Payer: MEDICARE

## 2021-08-16 ENCOUNTER — TELEPHONE (OUTPATIENT)
Dept: CARDIOLOGY | Facility: HOSPITAL | Age: 66
End: 2021-08-16
Payer: MEDICARE

## 2021-08-16 VITALS
WEIGHT: 154 LBS | BODY MASS INDEX: 26.29 KG/M2 | DIASTOLIC BLOOD PRESSURE: 82 MMHG | OXYGEN SATURATION: 99 % | HEART RATE: 75 BPM | HEIGHT: 64 IN | SYSTOLIC BLOOD PRESSURE: 142 MMHG

## 2021-08-16 DIAGNOSIS — E78.2 MIXED HYPERLIPIDEMIA: ICD-10-CM

## 2021-08-16 DIAGNOSIS — I10 ESSENTIAL HYPERTENSION: Primary | ICD-10-CM

## 2021-08-16 DIAGNOSIS — I47.10 PAROXYSMAL SVT (SUPRAVENTRICULAR TACHYCARDIA): ICD-10-CM

## 2021-08-16 DIAGNOSIS — R00.2 PALPITATIONS: ICD-10-CM

## 2021-08-16 PROCEDURE — 3079F DIAST BP 80-89 MM HG: CPT | Mod: CPTII,S$GLB,, | Performed by: NURSE PRACTITIONER

## 2021-08-16 PROCEDURE — 99214 PR OFFICE/OUTPT VISIT, EST, LEVL IV, 30-39 MIN: ICD-10-PCS | Mod: S$GLB,,, | Performed by: NURSE PRACTITIONER

## 2021-08-16 PROCEDURE — 99214 OFFICE O/P EST MOD 30 MIN: CPT | Mod: S$GLB,,, | Performed by: NURSE PRACTITIONER

## 2021-08-16 PROCEDURE — 1159F MED LIST DOCD IN RCRD: CPT | Mod: CPTII,S$GLB,, | Performed by: NURSE PRACTITIONER

## 2021-08-16 PROCEDURE — 1160F RVW MEDS BY RX/DR IN RCRD: CPT | Mod: CPTII,S$GLB,, | Performed by: NURSE PRACTITIONER

## 2021-08-16 PROCEDURE — 3288F PR FALLS RISK ASSESSMENT DOCUMENTED: ICD-10-PCS | Mod: CPTII,S$GLB,, | Performed by: NURSE PRACTITIONER

## 2021-08-16 PROCEDURE — 3288F FALL RISK ASSESSMENT DOCD: CPT | Mod: CPTII,S$GLB,, | Performed by: NURSE PRACTITIONER

## 2021-08-16 PROCEDURE — 1126F AMNT PAIN NOTED NONE PRSNT: CPT | Mod: CPTII,S$GLB,, | Performed by: NURSE PRACTITIONER

## 2021-08-16 PROCEDURE — 3008F PR BODY MASS INDEX (BMI) DOCUMENTED: ICD-10-PCS | Mod: CPTII,S$GLB,, | Performed by: NURSE PRACTITIONER

## 2021-08-16 PROCEDURE — 3079F PR MOST RECENT DIASTOLIC BLOOD PRESSURE 80-89 MM HG: ICD-10-PCS | Mod: CPTII,S$GLB,, | Performed by: NURSE PRACTITIONER

## 2021-08-16 PROCEDURE — 3008F BODY MASS INDEX DOCD: CPT | Mod: CPTII,S$GLB,, | Performed by: NURSE PRACTITIONER

## 2021-08-16 PROCEDURE — 1101F PT FALLS ASSESS-DOCD LE1/YR: CPT | Mod: CPTII,S$GLB,, | Performed by: NURSE PRACTITIONER

## 2021-08-16 PROCEDURE — 93005 ELECTROCARDIOGRAM TRACING: CPT | Mod: S$GLB,,, | Performed by: NURSE PRACTITIONER

## 2021-08-16 PROCEDURE — 93010 ELECTROCARDIOGRAM REPORT: CPT | Mod: S$GLB,,, | Performed by: GENERAL PRACTICE

## 2021-08-16 PROCEDURE — 93010 EKG 12-LEAD: ICD-10-PCS | Mod: S$GLB,,, | Performed by: GENERAL PRACTICE

## 2021-08-16 PROCEDURE — 93005 EKG 12-LEAD: ICD-10-PCS | Mod: S$GLB,,, | Performed by: NURSE PRACTITIONER

## 2021-08-16 PROCEDURE — 3077F PR MOST RECENT SYSTOLIC BLOOD PRESSURE >= 140 MM HG: ICD-10-PCS | Mod: CPTII,S$GLB,, | Performed by: NURSE PRACTITIONER

## 2021-08-16 PROCEDURE — 1159F PR MEDICATION LIST DOCUMENTED IN MEDICAL RECORD: ICD-10-PCS | Mod: CPTII,S$GLB,, | Performed by: NURSE PRACTITIONER

## 2021-08-16 PROCEDURE — 1126F PR PAIN SEVERITY QUANTIFIED, NO PAIN PRESENT: ICD-10-PCS | Mod: CPTII,S$GLB,, | Performed by: NURSE PRACTITIONER

## 2021-08-16 PROCEDURE — 1160F PR REVIEW ALL MEDS BY PRESCRIBER/CLIN PHARMACIST DOCUMENTED: ICD-10-PCS | Mod: CPTII,S$GLB,, | Performed by: NURSE PRACTITIONER

## 2021-08-16 PROCEDURE — 1101F PR PT FALLS ASSESS DOC 0-1 FALLS W/OUT INJ PAST YR: ICD-10-PCS | Mod: CPTII,S$GLB,, | Performed by: NURSE PRACTITIONER

## 2021-08-16 PROCEDURE — 3077F SYST BP >= 140 MM HG: CPT | Mod: CPTII,S$GLB,, | Performed by: NURSE PRACTITIONER

## 2021-08-16 NOTE — TELEPHONE ENCOUNTER
----- Message from Penelope Wolfe, Patient Care Assistant sent at 8/16/2021  4:00 PM CDT -----  Regarding: advice  Contact: pt  Type: Needs Medical Advice  Who Called:  pt   Pharmacy name and phone #:    [id number] - [place] - [address] AT [facility] [address]  [address] [facility] [place] [address]  Phone: [phone] Fax: [phone]    Best Call Back Number: [phone] (home)   Additional Information: pt states she would like a callback regarding a medication. Please call to advise. Thanks!

## 2021-09-23 ENCOUNTER — HOSPITAL ENCOUNTER (OUTPATIENT)
Dept: CARDIOLOGY | Facility: CLINIC | Age: 66
Discharge: HOME | End: 2021-09-23
Attending: INTERNAL MEDICINE
Payer: MEDICARE

## 2021-09-23 DIAGNOSIS — I47.10 PAROXYSMAL SVT (SUPRAVENTRICULAR TACHYCARDIA): ICD-10-CM

## 2021-09-23 PROCEDURE — 93298 REM INTERROG DEV EVAL SCRMS: CPT | Mod: S$GLB,,, | Performed by: INTERNAL MEDICINE

## 2021-09-23 PROCEDURE — 93298 CARDIAC DEVICE CHECK - REMOTE: ICD-10-PCS | Mod: S$GLB,,, | Performed by: INTERNAL MEDICINE

## 2021-11-15 ENCOUNTER — PATIENT MESSAGE (OUTPATIENT)
Dept: ADMINISTRATIVE | Facility: OTHER | Age: 66
End: 2021-11-15
Payer: MEDICARE

## 2021-11-16 ENCOUNTER — PATIENT MESSAGE (OUTPATIENT)
Dept: ADMINISTRATIVE | Facility: OTHER | Age: 66
End: 2021-11-16
Payer: MEDICARE

## 2021-11-18 ENCOUNTER — PATIENT MESSAGE (OUTPATIENT)
Dept: ADMINISTRATIVE | Facility: OTHER | Age: 66
End: 2021-11-18
Payer: MEDICARE

## 2021-11-29 ENCOUNTER — OFFICE VISIT (OUTPATIENT)
Dept: OBSTETRICS AND GYNECOLOGY | Facility: CLINIC | Age: 66
End: 2021-11-29
Payer: MEDICARE

## 2021-11-29 DIAGNOSIS — Z00.00 PREVENTATIVE HEALTH CARE: Primary | ICD-10-CM

## 2021-11-29 DIAGNOSIS — Z12.31 VISIT FOR SCREENING MAMMOGRAM: Primary | ICD-10-CM

## 2021-11-29 PROCEDURE — 99499 UNLISTED E&M SERVICE: CPT | Mod: S$GLB,,, | Performed by: SPECIALIST

## 2021-11-29 PROCEDURE — 99499 NO LOS: ICD-10-PCS | Mod: S$GLB,,, | Performed by: SPECIALIST

## 2022-01-28 ENCOUNTER — LAB VISIT (OUTPATIENT)
Dept: PRIMARY CARE CLINIC | Facility: OTHER | Age: 67
End: 2022-01-28
Attending: INTERNAL MEDICINE
Payer: MEDICARE

## 2022-01-28 DIAGNOSIS — Z20.822 ENCOUNTER FOR LABORATORY TESTING FOR COVID-19 VIRUS: ICD-10-CM

## 2022-01-28 PROCEDURE — U0003 INFECTIOUS AGENT DETECTION BY NUCLEIC ACID (DNA OR RNA); SEVERE ACUTE RESPIRATORY SYNDROME CORONAVIRUS 2 (SARS-COV-2) (CORONAVIRUS DISEASE [COVID-19]), AMPLIFIED PROBE TECHNIQUE, MAKING USE OF HIGH THROUGHPUT TECHNOLOGIES AS DESCRIBED BY CMS-2020-01-R: HCPCS | Performed by: INTERNAL MEDICINE

## 2022-01-29 LAB
SARS-COV-2 RNA RESP QL NAA+PROBE: NOT DETECTED
SARS-COV-2- CYCLE NUMBER: NORMAL

## 2022-03-07 ENCOUNTER — PATIENT MESSAGE (OUTPATIENT)
Dept: ADMINISTRATIVE | Facility: OTHER | Age: 67
End: 2022-03-07
Payer: MEDICARE

## 2022-03-10 ENCOUNTER — OFFICE VISIT (OUTPATIENT)
Dept: OBSTETRICS AND GYNECOLOGY | Facility: CLINIC | Age: 67
End: 2022-03-10
Payer: MEDICARE

## 2022-03-10 ENCOUNTER — HOSPITAL ENCOUNTER (OUTPATIENT)
Dept: RADIOLOGY | Facility: HOSPITAL | Age: 67
Discharge: HOME | End: 2022-03-10
Attending: SPECIALIST
Payer: MEDICARE

## 2022-03-10 VITALS
HEIGHT: 64 IN | SYSTOLIC BLOOD PRESSURE: 124 MMHG | DIASTOLIC BLOOD PRESSURE: 72 MMHG | BODY MASS INDEX: 26.31 KG/M2 | WEIGHT: 154.13 LBS

## 2022-03-10 DIAGNOSIS — Z00.00 PREVENTATIVE HEALTH CARE: Primary | ICD-10-CM

## 2022-03-10 DIAGNOSIS — Z12.31 VISIT FOR SCREENING MAMMOGRAM: ICD-10-CM

## 2022-03-10 PROCEDURE — 1126F PR PAIN SEVERITY QUANTIFIED, NO PAIN PRESENT: ICD-10-PCS | Mod: CPTII,S$GLB,, | Performed by: SPECIALIST

## 2022-03-10 PROCEDURE — 1101F PT FALLS ASSESS-DOCD LE1/YR: CPT | Mod: CPTII,S$GLB,, | Performed by: SPECIALIST

## 2022-03-10 PROCEDURE — 3078F PR MOST RECENT DIASTOLIC BLOOD PRESSURE < 80 MM HG: ICD-10-PCS | Mod: CPTII,S$GLB,, | Performed by: SPECIALIST

## 2022-03-10 PROCEDURE — 3074F PR MOST RECENT SYSTOLIC BLOOD PRESSURE < 130 MM HG: ICD-10-PCS | Mod: CPTII,S$GLB,, | Performed by: SPECIALIST

## 2022-03-10 PROCEDURE — 3008F PR BODY MASS INDEX (BMI) DOCUMENTED: ICD-10-PCS | Mod: CPTII,S$GLB,, | Performed by: SPECIALIST

## 2022-03-10 PROCEDURE — 77063 BREAST TOMOSYNTHESIS BI: CPT | Mod: 26,,, | Performed by: RADIOLOGY

## 2022-03-10 PROCEDURE — 99999 PR PBB SHADOW E&M-EST. PATIENT-LVL IV: ICD-10-PCS | Mod: PBBFAC,,, | Performed by: SPECIALIST

## 2022-03-10 PROCEDURE — 99999 PR PBB SHADOW E&M-EST. PATIENT-LVL IV: CPT | Mod: PBBFAC,,, | Performed by: SPECIALIST

## 2022-03-10 PROCEDURE — 1159F PR MEDICATION LIST DOCUMENTED IN MEDICAL RECORD: ICD-10-PCS | Mod: CPTII,S$GLB,, | Performed by: SPECIALIST

## 2022-03-10 PROCEDURE — 3288F FALL RISK ASSESSMENT DOCD: CPT | Mod: CPTII,S$GLB,, | Performed by: SPECIALIST

## 2022-03-10 PROCEDURE — 3078F DIAST BP <80 MM HG: CPT | Mod: CPTII,S$GLB,, | Performed by: SPECIALIST

## 2022-03-10 PROCEDURE — 3074F SYST BP LT 130 MM HG: CPT | Mod: CPTII,S$GLB,, | Performed by: SPECIALIST

## 2022-03-10 PROCEDURE — 77063 MAMMO DIGITAL SCREENING BILAT WITH TOMO: ICD-10-PCS | Mod: 26,,, | Performed by: RADIOLOGY

## 2022-03-10 PROCEDURE — 77067 MAMMO DIGITAL SCREENING BILAT WITH TOMO: ICD-10-PCS | Mod: 26,,, | Performed by: RADIOLOGY

## 2022-03-10 PROCEDURE — 1159F MED LIST DOCD IN RCRD: CPT | Mod: CPTII,S$GLB,, | Performed by: SPECIALIST

## 2022-03-10 PROCEDURE — G0101 PR CA SCREEN;PELVIC/BREAST EXAM: ICD-10-PCS | Mod: S$GLB,,, | Performed by: SPECIALIST

## 2022-03-10 PROCEDURE — 77067 SCR MAMMO BI INCL CAD: CPT | Mod: 26,,, | Performed by: RADIOLOGY

## 2022-03-10 PROCEDURE — 77063 BREAST TOMOSYNTHESIS BI: CPT | Mod: TC,PN

## 2022-03-10 PROCEDURE — 1126F AMNT PAIN NOTED NONE PRSNT: CPT | Mod: CPTII,S$GLB,, | Performed by: SPECIALIST

## 2022-03-10 PROCEDURE — 3288F PR FALLS RISK ASSESSMENT DOCUMENTED: ICD-10-PCS | Mod: CPTII,S$GLB,, | Performed by: SPECIALIST

## 2022-03-10 PROCEDURE — 1101F PR PT FALLS ASSESS DOC 0-1 FALLS W/OUT INJ PAST YR: ICD-10-PCS | Mod: CPTII,S$GLB,, | Performed by: SPECIALIST

## 2022-03-10 PROCEDURE — G0101 CA SCREEN;PELVIC/BREAST EXAM: HCPCS | Mod: S$GLB,,, | Performed by: SPECIALIST

## 2022-03-10 PROCEDURE — 3008F BODY MASS INDEX DOCD: CPT | Mod: CPTII,S$GLB,, | Performed by: SPECIALIST

## 2022-03-10 NOTE — PROGRESS NOTES
66 yo BF presents for annual gyn eval  Past Medical History:   Diagnosis Date    Colon torsion     Coronary artery disease     CAD, pt states nild MI    Gastroparesis     GERD (gastroesophageal reflux disease)     Hyperlipidemia     Hypertension     Sciatica        Past Surgical History:   Procedure Laterality Date    ABDOMINAL SURGERY      APPENDECTOMY      COLON SURGERY      due to torsion, clipped adhesions    COLONOSCOPY  08/13/2014    JUNIOR.   One 1 mm polyp in the distal ascending colon.  Nonneoplastic colonic mucosa with reactive/regenerative changes.  Nodule (inverted stump?) at the appendiceal orifice.  Nonneoplastic colonic mucosa with prominent lymphoid aggregates.    COLONOSCOPY N/A 10/12/2020    Procedure: COLONOSCOPY;  Surgeon: Robb Kwong Jr., MD;  Location: [facility] ENDO;  Service: Endoscopy;  Laterality: N/A;    ESOPHAGOGASTRODUODENOSCOPY      HYSTERECTOMY      INSERTION OF IMPLANTABLE LOOP RECORDER Left 11/12/2020    Procedure: Insertion, Implantable Loop Recorder;  Surgeon: Madna Benitez MD;  Location: [facility] CATH/EP LAB;  Service: Cardiology;  Laterality: Left;    OOPHORECTOMY         Family History   Problem Relation Age of Onset    Pacemaker/defibrilator Mother     Hypertension Mother     No Known Problems Father     Breast cancer Sister 58    Ovarian cancer Maternal Grandmother        Social History     Socioeconomic History    Marital status:    Tobacco Use    Smoking status: Never Smoker    Smokeless tobacco: Never Used   Substance and Sexual Activity    Alcohol use: No    Drug use: No       Current Outpatient Medications   Medication Sig Dispense Refill    amLODIPine (NORVASC) 5 MG tablet Take 1 tablet (5 mg total) by mouth 2 (two) times a day. 180 tablet 1    aspirin (ASPIR-81 ORAL) Take 1 tablet by mouth once daily.      atorvastatin (LIPITOR) 20 MG tablet TAKE 1 TABLET(20 MG) BY MOUTH EVERY DAY 90 tablet 3    azelastine (ASTELIN) 137 mcg (0.1  %) nasal spray 1 spray (137 mcg total) by Nasal route 2 (two) times daily. 30 mL 0    cetirizine (ZYRTEC) 10 MG tablet Take 10 mg by mouth once daily.      doxycycline (VIBRA-TABS) 100 MG tablet Take 100 mg by mouth 2 (two) times daily.      estrogens, conjugated, (PREMARIN) 0.9 MG Tab TAKE 1 TABLET(0.9 MG) BY MOUTH EVERY DAY 90 tablet 3    FLUZONE HIGHDOSE QUAD 20-21  mcg/0.7 mL Syrg ADM 0.7ML IM UTD      gentamicin (GARAMYCIN) 0.3 % ophthalmic solution INSTILL 2 DROPS IN EACH EYE EVERY 4 HOURS FOR 7 DAYS AS DIRECTED      HYDROcodone-acetaminophen (NORCO) 5-325 mg per tablet Take 1 tablet by mouth every 8 (eight) hours as needed. 8 tablet 0    levocetirizine (XYZAL) 5 MG tablet TK 1 T PO  QAM PRF SINUS ALLERGY OR DRIP  6    metoprolol succinate (TOPROL-XL) 50 MG 24 hr tablet Take 1 tablet (50 mg total) by mouth once daily. 90 tablet 2    omega-3 fatty acids/fish oil (FISH OIL-OMEGA-3 FATTY ACIDS) 300-1,000 mg capsule Take 1 capsule by mouth once daily. otc      promethazine-dextromethorphan (PROMETHAZINE-DM) 6.25-15 mg/5 mL Syrp TAKE ACUTE OPIOID THERAPY ML BY MOUTH TWICE DAILY AS NEEDED FOR COUGH WHILE AT HOME AS DIRECTED      vitamin D (VITAMIN D3) 1000 units Tab Take 1,000 Units by mouth once daily. OTC      famotidine (PEPCID) 20 MG tablet Take 1 tablet (20 mg total) by mouth 2 (two) times daily. 60 tablet 1     No current facility-administered medications for this visit.       Review of patient's allergies indicates:   Allergen Reactions    Bee sting [allergen ext-venom-honey bee] Swelling    Iodine and iodide containing products Palpitations       Review of System:   General: no chills, fever, night sweats, weight gain or weight loss  Psychological: no depression or suicidal ideation  Breasts: no new or changing breast lumps, nipple discharge or masses.  Respiratory: no cough, shortness of breath, or wheezing  Cardiovascular: no chest pain or dyspnea on exertion  Gastrointestinal: no  abdominal pain, change in bowel habits, or black or bloody stools  Genito-Urinary: no incontinence, urinary frequency/urgency or vulvar/vaginal symptoms, pelvic pain or abnormal vaginal bleeding.  Musculoskeletal: no gait disturbance or muscular weakness    PE:   APPEARANCE: Well nourished, well developed, in no acute distress.  NECK: Neck symmetric without masses or thyromegaly.  NODES: No inguinal lymph node enlargement.  ABDOMEN: Soft. No tenderness or masses. No hepatosplenomegaly. No hernias.  BREASTS: Symmetrical, no skin changes or visible lesions. No palpable masses, nipple discharge or adenopathy bilaterally.  PELVIC: Normal external female genitalia without lesions. Normal hair distribution. Adequate perineal body, normal urethral meatus. Vagina moist and well rugated without lesions or discharge. No significant cystocele or rectocele. Uterus and cervix surgically absent. Bimanual exam revealed no masses, tenderness or abnormality.    NOTE  NURSING PERSONAL PRESENT FOR ENTIRE PHYSICAL EXAM      Plan Mammo screening today and yearly  BSE monthly  RTO 2 years/prn

## 2022-03-29 DIAGNOSIS — I47.10 PAROXYSMAL SVT (SUPRAVENTRICULAR TACHYCARDIA): Primary | ICD-10-CM

## 2023-01-17 ENCOUNTER — OFFICE VISIT (OUTPATIENT)
Dept: CARDIOLOGY | Facility: CLINIC | Age: 68
End: 2023-01-17
Payer: MEDICARE

## 2023-01-17 VITALS
SYSTOLIC BLOOD PRESSURE: 128 MMHG | RESPIRATION RATE: 16 BRPM | HEIGHT: 64 IN | DIASTOLIC BLOOD PRESSURE: 68 MMHG | OXYGEN SATURATION: 98 % | BODY MASS INDEX: 26.29 KG/M2 | HEART RATE: 64 BPM | WEIGHT: 154 LBS

## 2023-01-17 DIAGNOSIS — I47.10 PAROXYSMAL SVT (SUPRAVENTRICULAR TACHYCARDIA): ICD-10-CM

## 2023-01-17 DIAGNOSIS — I10 ESSENTIAL HYPERTENSION: Primary | ICD-10-CM

## 2023-01-17 DIAGNOSIS — R94.31 NONSPECIFIC ABNORMAL ELECTROCARDIOGRAM (ECG) (EKG): ICD-10-CM

## 2023-01-17 DIAGNOSIS — R00.2 PALPITATIONS: ICD-10-CM

## 2023-01-17 DIAGNOSIS — E78.2 MIXED HYPERLIPIDEMIA: ICD-10-CM

## 2023-01-17 PROCEDURE — 1160F RVW MEDS BY RX/DR IN RCRD: CPT | Mod: CPTII,S$GLB,, | Performed by: INTERNAL MEDICINE

## 2023-01-17 PROCEDURE — 1125F AMNT PAIN NOTED PAIN PRSNT: CPT | Mod: CPTII,S$GLB,, | Performed by: INTERNAL MEDICINE

## 2023-01-17 PROCEDURE — 99999 PR PBB SHADOW E&M-EST. PATIENT-LVL IV: ICD-10-PCS | Mod: PBBFAC,,, | Performed by: INTERNAL MEDICINE

## 2023-01-17 PROCEDURE — 3074F SYST BP LT 130 MM HG: CPT | Mod: CPTII,S$GLB,, | Performed by: INTERNAL MEDICINE

## 2023-01-17 PROCEDURE — 3074F PR MOST RECENT SYSTOLIC BLOOD PRESSURE < 130 MM HG: ICD-10-PCS | Mod: CPTII,S$GLB,, | Performed by: INTERNAL MEDICINE

## 2023-01-17 PROCEDURE — 1159F PR MEDICATION LIST DOCUMENTED IN MEDICAL RECORD: ICD-10-PCS | Mod: CPTII,S$GLB,, | Performed by: INTERNAL MEDICINE

## 2023-01-17 PROCEDURE — 99214 OFFICE O/P EST MOD 30 MIN: CPT | Mod: S$GLB,,, | Performed by: INTERNAL MEDICINE

## 2023-01-17 PROCEDURE — 93000 EKG 12-LEAD: ICD-10-PCS | Mod: S$GLB,,, | Performed by: INTERNAL MEDICINE

## 2023-01-17 PROCEDURE — 3008F PR BODY MASS INDEX (BMI) DOCUMENTED: ICD-10-PCS | Mod: CPTII,S$GLB,, | Performed by: INTERNAL MEDICINE

## 2023-01-17 PROCEDURE — 1159F MED LIST DOCD IN RCRD: CPT | Mod: CPTII,S$GLB,, | Performed by: INTERNAL MEDICINE

## 2023-01-17 PROCEDURE — 1101F PR PT FALLS ASSESS DOC 0-1 FALLS W/OUT INJ PAST YR: ICD-10-PCS | Mod: CPTII,S$GLB,, | Performed by: INTERNAL MEDICINE

## 2023-01-17 PROCEDURE — 93000 ELECTROCARDIOGRAM COMPLETE: CPT | Mod: S$GLB,,, | Performed by: INTERNAL MEDICINE

## 2023-01-17 PROCEDURE — 3078F PR MOST RECENT DIASTOLIC BLOOD PRESSURE < 80 MM HG: ICD-10-PCS | Mod: CPTII,S$GLB,, | Performed by: INTERNAL MEDICINE

## 2023-01-17 PROCEDURE — 99214 PR OFFICE/OUTPT VISIT, EST, LEVL IV, 30-39 MIN: ICD-10-PCS | Mod: S$GLB,,, | Performed by: INTERNAL MEDICINE

## 2023-01-17 PROCEDURE — 3288F FALL RISK ASSESSMENT DOCD: CPT | Mod: CPTII,S$GLB,, | Performed by: INTERNAL MEDICINE

## 2023-01-17 PROCEDURE — 1101F PT FALLS ASSESS-DOCD LE1/YR: CPT | Mod: CPTII,S$GLB,, | Performed by: INTERNAL MEDICINE

## 2023-01-17 PROCEDURE — 99999 PR PBB SHADOW E&M-EST. PATIENT-LVL IV: CPT | Mod: PBBFAC,,, | Performed by: INTERNAL MEDICINE

## 2023-01-17 PROCEDURE — 1160F PR REVIEW ALL MEDS BY PRESCRIBER/CLIN PHARMACIST DOCUMENTED: ICD-10-PCS | Mod: CPTII,S$GLB,, | Performed by: INTERNAL MEDICINE

## 2023-01-17 PROCEDURE — 3288F PR FALLS RISK ASSESSMENT DOCUMENTED: ICD-10-PCS | Mod: CPTII,S$GLB,, | Performed by: INTERNAL MEDICINE

## 2023-01-17 PROCEDURE — 1125F PR PAIN SEVERITY QUANTIFIED, PAIN PRESENT: ICD-10-PCS | Mod: CPTII,S$GLB,, | Performed by: INTERNAL MEDICINE

## 2023-01-17 PROCEDURE — 3008F BODY MASS INDEX DOCD: CPT | Mod: CPTII,S$GLB,, | Performed by: INTERNAL MEDICINE

## 2023-01-17 PROCEDURE — 3078F DIAST BP <80 MM HG: CPT | Mod: CPTII,S$GLB,, | Performed by: INTERNAL MEDICINE

## 2023-01-17 RX ORDER — ZINC SULFATE 66 MG
TABLET ORAL
COMMUNITY
End: 2023-05-16

## 2023-01-17 RX ORDER — CALCIUM CARB/VITAMIN D3/VIT K1 500-500-40
TABLET,CHEWABLE ORAL
COMMUNITY

## 2023-01-17 NOTE — PROGRESS NOTES
Subjective:    Patient ID:  Marga Pak is a 67 y.o. female     Chief Complaint   Patient presents with    Hyperlipidemia    Hypertension       HPI:  Ms Marga Pak is a 67 y.o. female is here for follow-up.    Patient has been doing well no specific complaints at the present time.    She has a right foot drop and she is been going to physical therapy.    Her breathing is good denies any shortness of breath or difficulty in breathing denies any chest pain or tightness or heaviness denies any dizziness or lightheadedness or loss of consciousness.        Review of patient's allergies indicates:   Allergen Reactions    Bee sting [allergen ext-venom-honey bee] Swelling    Bee venom protein (honey bee)     Iodine     Iodine and iodide containing products Palpitations       Past Medical History:   Diagnosis Date    Colon torsion     Coronary artery disease     CAD, pt states nild MI    Gastroparesis     GERD (gastroesophageal reflux disease)     Hyperlipidemia     Hypertension     Sciatica      Past Surgical History:   Procedure Laterality Date    ABDOMINAL SURGERY      APPENDECTOMY      COLON SURGERY      due to torsion, clipped adhesions    COLONOSCOPY  08/13/2014    JUNIOR.   One 1 mm polyp in the distal ascending colon.  Nonneoplastic colonic mucosa with reactive/regenerative changes.  Nodule (inverted stump?) at the appendiceal orifice.  Nonneoplastic colonic mucosa with prominent lymphoid aggregates.    COLONOSCOPY N/A 10/12/2020    Procedure: COLONOSCOPY;  Surgeon: Robb Kwong Jr., MD;  Location: [facility];  Service: Endoscopy;  Laterality: N/A;    ESOPHAGOGASTRODUODENOSCOPY      HYSTERECTOMY      INSERTION OF IMPLANTABLE LOOP RECORDER Left 11/12/2020    Procedure: Insertion, Implantable Loop Recorder;  Surgeon: Madan Benitez MD;  Location: [facility] CATH/EP LAB;  Service: Cardiology;  Laterality: Left;    OOPHORECTOMY       Social History     Tobacco Use    Smoking status: Never    Smokeless  tobacco: Never   Substance Use Topics    Alcohol use: No    Drug use: No     Family History   Problem Relation Age of Onset    Pacemaker/defibrilator Mother     Hypertension Mother     No Known Problems Father     Breast cancer Sister 58    Ovarian cancer Maternal Grandmother         Review of Systems:   Constitution: Negative for diaphoresis and fever.   HEENT: Negative for nosebleeds.    Cardiovascular: Negative for chest pain       No dyspnea on exertion       No leg swelling        No palpitations  Respiratory: Negative for shortness of breath and wheezing.    Hematologic/Lymphatic: Negative for bleeding problem. Does not bruise/bleed easily.   Skin: Negative for color change and rash.   Musculoskeletal: Negative for falls and myalgias.   Gastrointestinal: Negative for hematemesis and hematochezia.   Genitourinary: Negative for hematuria.   Neurological: Negative for dizziness and light-headedness.   Psychiatric/Behavioral: Negative for altered mental status and memory loss.          Objective:        Vitals:    01/17/23 0959   BP: 128/68   Pulse: 64   Resp: 16       Lab Results   Component Value Date    WBC 6.89 11/12/2020    HGB 12.0 11/12/2020    HCT 36.6 (L) 11/12/2020     11/12/2020    CHOL 237 (H) 11/14/2017    TRIG 78 11/14/2017    HDL 75 11/14/2017    ALT 11 11/12/2020    AST 19 11/12/2020     11/12/2020    K 4.0 11/12/2020     11/12/2020    CREATININE 0.7 11/12/2020    BUN 19 11/12/2020    CO2 29 11/12/2020    TSH 1.630 11/10/2020    INR 1.1 11/12/2020        ECHOCARDIOGRAM RESULTS  Results for orders placed during the hospital encounter of 11/10/20    Echo Color Flow Doppler? Yes; Bubble Contrast? No    Interpretation Summary  · There is left ventricular concentric remodeling.  · The left ventricle is normal in size with normal systolic function. The estimated ejection fraction is 60%.  · Normal left ventricular diastolic function.  · Normal right ventricular size with normal right  ventricular systolic function.  · Mild tricuspid regurgitation.  · Normal central venous pressure (3 mmHg).  · The estimated PA systolic pressure is 32 mmHg.        CURRENT/PREVIOUS VISIT EKG  Results for orders placed or performed in visit on 08/16/21   IN OFFICE EKG 12-LEAD (to [place])    Collection Time: 08/16/21  1:55 PM    Narrative    Test Reason : I10,I47.1,    Vent. Rate : 072 BPM     Atrial Rate : 072 BPM     P-R Int : 172 ms          QRS Dur : 076 ms      QT Int : 388 ms       P-R-T Axes : 053 -20 028 degrees     QTc Int : 424 ms    Normal sinus rhythm  Septal infarct (cited on or before 16-FEB-2020)  Abnormal ECG  When compared with ECG of 12-NOV-2020 13:31,  No significant change was found  Confirmed by Sami ARANDA, Scotty DUBOIS ([id number]) on 8/19/2021 4:22:05 PM    Referred By:  [place]           Confirmed By:Scotty Gallardo MD     No valid procedures specified.   Results for orders placed during the hospital encounter of 02/16/20    Treadmill Stress Test    Interpretation Summary    The EKG portion of this study is negative for ischemia.    The patient reported no chest pain during the stress test.    Arrhythmias during stress: occasional PACs, atrial fibrillation.    The exercise capacity was above average.      Physical Exam:  CONSTITUTIONAL: No fever, no chills  HEENT: Normocephalic, atraumatic,pupils reactive to light                 NECK:  No JVD no carotid bruit  CVS: S1S2+, RRR, systolic murmurs,   LUNGS: Clear  ABDOMEN: Soft, NT, BS+  EXTREMITIES: No cyanosis, edema  : No butler catheter  NEURO: AAO X 3  PSY: Normal affect      Medication List with Changes/Refills   Current Medications    AMLODIPINE (NORVASC) 10 MG TABLET    TAKE 1 TABLET BY MOUTH EVERY DAY    ASCORBIC ACID, VITAMIN C, (VITAMIN C) 100 MG TABLET    Vitamin C    ASPIRIN (ASPIR-81 ORAL)    Take 1 tablet by mouth once daily.    ATORVASTATIN (LIPITOR) 20 MG TABLET    TAKE 1 TABLET(20 MG) BY MOUTH EVERY DAY    CETIRIZINE (ZYRTEC) 10 MG TABLET     Take 10 mg by mouth once daily.    CHOLECALCIFEROL, VITAMIN D3, 10 MCG (400 UNIT) CAP CAPSULE    Vitamin D3    ESTROGENS, CONJUGATED, (PREMARIN) 0.9 MG TAB    TAKE 1 TABLET(0.9 MG) BY MOUTH EVERY DAY    FAMOTIDINE (PEPCID) 20 MG TABLET    Take 1 tablet (20 mg total) by mouth 2 (two) times daily.    FLUZONE HIGHDOSE QUAD 20-21  MCG/0.7 ML SYRG    ADM 0.7ML IM UTD    LEVOCETIRIZINE (XYZAL) 5 MG TABLET    TK 1 T PO  QAM PRF SINUS ALLERGY OR DRIP    METOPROLOL SUCCINATE (TOPROL-XL) 50 MG 24 HR TABLET    Take 1 tablet (50 mg total) by mouth once daily.    OMEGA-3 FATTY ACIDS/FISH OIL (FISH OIL-OMEGA-3 FATTY ACIDS) 300-1,000 MG CAPSULE    Take 1 capsule by mouth once daily. otc    VITAMIN D (VITAMIN D3) 1000 UNITS TAB    Take 1,000 Units by mouth once daily. OTC    ZINC SULFATE (ZINC-15) 66 MG TAB    zinc   Discontinued Medications    AZELASTINE (ASTELIN) 137 MCG (0.1 %) NASAL SPRAY    1 spray (137 mcg total) by Nasal route 2 (two) times daily.    DOXYCYCLINE (VIBRA-TABS) 100 MG TABLET    Take 100 mg by mouth 2 (two) times daily.    GENTAMICIN (GARAMYCIN) 0.3 % OPHTHALMIC SOLUTION    INSTILL 2 DROPS IN EACH EYE EVERY 4 HOURS FOR 7 DAYS AS DIRECTED    HYDROCODONE-ACETAMINOPHEN (NORCO) 5-325 MG PER TABLET    Take 1 tablet by mouth every 8 (eight) hours as needed.    PROMETHAZINE-DEXTROMETHORPHAN (PROMETHAZINE-DM) 6.25-15 MG/5 ML SYRP    TAKE ACUTE OPIOID THERAPY ML BY MOUTH TWICE DAILY AS NEEDED FOR COUGH WHILE AT HOME AS DIRECTED             Assessment:       1. Essential hypertension    2. Paroxysmal SVT (supraventricular tachycardia)    3. Mixed hyperlipidemia    4. Palpitations    5. Nonspecific abnormal electrocardiogram (ECG) (EKG)         Plan:   1. Essential hypertension   Patient's blood pressure is very well controlled is 128/68.  And she is currently on amlodipine 10 mg p.o. daily  And metoprolol succinate 50 mg p.o. daily.  Continue the same.  2. Paroxysmal supraventricular tachycardia  Patient's heart  rate is stable at 68 beats per minute.    She has not had any further episodes of palpitations or rapid heart rate.  She is currently on metoprolol succinate 50 mg p.o. daily.  3. Mixed hyperlipidemia   Patient is stable and she is currently on atorvastatin 20 mg p.o. daily continue the same.  And she is also on omega-3 fatty acids continue the same.  And a primary physician is checking her cholesterol and liver function tests.    EKG   Reviewed EKG independently patient is in normal sinus rhythm with heart rate of 64 beats per minute essentially within normal limits and normal intervals.  5. Patient to continue current medication I will see her back in the office in 6 months time.        Problem List Items Addressed This Visit          Cardiac/Vascular    Essential hypertension - Primary    Hyperlipidemia    Palpitations    Paroxysmal SVT (supraventricular tachycardia)     Other Visit Diagnoses       Nonspecific abnormal electrocardiogram (ECG) (EKG)                No follow-ups on file.

## 2023-04-05 ENCOUNTER — OFFICE VISIT (OUTPATIENT)
Dept: OBSTETRICS AND GYNECOLOGY | Facility: CLINIC | Age: 68
End: 2023-04-05
Payer: MEDICARE

## 2023-04-05 ENCOUNTER — HOSPITAL ENCOUNTER (OUTPATIENT)
Dept: RADIOLOGY | Facility: HOSPITAL | Age: 68
Discharge: HOME | End: 2023-04-05
Attending: SPECIALIST
Payer: MEDICARE

## 2023-04-05 ENCOUNTER — TELEPHONE (OUTPATIENT)
Dept: CARDIOLOGY | Facility: CLINIC | Age: 68
End: 2023-04-05
Payer: MEDICARE

## 2023-04-05 VITALS
SYSTOLIC BLOOD PRESSURE: 130 MMHG | BODY MASS INDEX: 26.45 KG/M2 | WEIGHT: 154.13 LBS | DIASTOLIC BLOOD PRESSURE: 70 MMHG | HEART RATE: 72 BPM

## 2023-04-05 DIAGNOSIS — Z12.31 VISIT FOR SCREENING MAMMOGRAM: Primary | ICD-10-CM

## 2023-04-05 DIAGNOSIS — N89.8 VAGINAL DRYNESS: ICD-10-CM

## 2023-04-05 DIAGNOSIS — Z12.31 VISIT FOR SCREENING MAMMOGRAM: ICD-10-CM

## 2023-04-05 PROCEDURE — 1126F AMNT PAIN NOTED NONE PRSNT: CPT | Mod: CPTII,S$GLB,, | Performed by: SPECIALIST

## 2023-04-05 PROCEDURE — 77067 MAMMO DIGITAL SCREENING BILAT WITH TOMO: ICD-10-PCS | Mod: 26,,, | Performed by: RADIOLOGY

## 2023-04-05 PROCEDURE — 77063 BREAST TOMOSYNTHESIS BI: CPT | Mod: 26,,, | Performed by: RADIOLOGY

## 2023-04-05 PROCEDURE — 77067 SCR MAMMO BI INCL CAD: CPT | Mod: 26,,, | Performed by: RADIOLOGY

## 2023-04-05 PROCEDURE — 3008F PR BODY MASS INDEX (BMI) DOCUMENTED: ICD-10-PCS | Mod: CPTII,S$GLB,, | Performed by: SPECIALIST

## 2023-04-05 PROCEDURE — 3078F PR MOST RECENT DIASTOLIC BLOOD PRESSURE < 80 MM HG: ICD-10-PCS | Mod: CPTII,S$GLB,, | Performed by: SPECIALIST

## 2023-04-05 PROCEDURE — 77067 SCR MAMMO BI INCL CAD: CPT | Mod: TC,PN

## 2023-04-05 PROCEDURE — 3008F BODY MASS INDEX DOCD: CPT | Mod: CPTII,S$GLB,, | Performed by: SPECIALIST

## 2023-04-05 PROCEDURE — 99999 PR PBB SHADOW E&M-EST. PATIENT-LVL II: CPT | Mod: PBBFAC,,, | Performed by: SPECIALIST

## 2023-04-05 PROCEDURE — 1159F MED LIST DOCD IN RCRD: CPT | Mod: CPTII,S$GLB,, | Performed by: SPECIALIST

## 2023-04-05 PROCEDURE — 99213 OFFICE O/P EST LOW 20 MIN: CPT | Mod: S$GLB,,, | Performed by: SPECIALIST

## 2023-04-05 PROCEDURE — 77063 MAMMO DIGITAL SCREENING BILAT WITH TOMO: ICD-10-PCS | Mod: 26,,, | Performed by: RADIOLOGY

## 2023-04-05 PROCEDURE — 1126F PR PAIN SEVERITY QUANTIFIED, NO PAIN PRESENT: ICD-10-PCS | Mod: CPTII,S$GLB,, | Performed by: SPECIALIST

## 2023-04-05 PROCEDURE — 3075F PR MOST RECENT SYSTOLIC BLOOD PRESS GE 130-139MM HG: ICD-10-PCS | Mod: CPTII,S$GLB,, | Performed by: SPECIALIST

## 2023-04-05 PROCEDURE — 1159F PR MEDICATION LIST DOCUMENTED IN MEDICAL RECORD: ICD-10-PCS | Mod: CPTII,S$GLB,, | Performed by: SPECIALIST

## 2023-04-05 PROCEDURE — 99999 PR PBB SHADOW E&M-EST. PATIENT-LVL II: ICD-10-PCS | Mod: PBBFAC,,, | Performed by: SPECIALIST

## 2023-04-05 PROCEDURE — 3075F SYST BP GE 130 - 139MM HG: CPT | Mod: CPTII,S$GLB,, | Performed by: SPECIALIST

## 2023-04-05 PROCEDURE — 99213 PR OFFICE/OUTPT VISIT, EST, LEVL III, 20-29 MIN: ICD-10-PCS | Mod: S$GLB,,, | Performed by: SPECIALIST

## 2023-04-05 PROCEDURE — 3078F DIAST BP <80 MM HG: CPT | Mod: CPTII,S$GLB,, | Performed by: SPECIALIST

## 2023-04-05 NOTE — TELEPHONE ENCOUNTER
----- Message from Deepika Robbins NP sent at 4/5/2023  9:42 AM CDT -----  Needs appointment to discuss elevated HR intermittently and episode of Afib noted from December (on device check).

## 2023-04-05 NOTE — PROGRESS NOTES
69 yo BF presents for annual gyn eval In addition, c/o vaginal dryness Pt is active  Past Medical History:   Diagnosis Date    Colon torsion     Coronary artery disease     CAD, pt states nild MI    Gastroparesis     GERD (gastroesophageal reflux disease)     Hyperlipidemia     Hypertension     Sciatica        Past Surgical History:   Procedure Laterality Date    ABDOMINAL SURGERY      APPENDECTOMY      COLON SURGERY      due to torsion, clipped adhesions    COLONOSCOPY  08/13/2014    JUNIOR.   One 1 mm polyp in the distal ascending colon.  Nonneoplastic colonic mucosa with reactive/regenerative changes.  Nodule (inverted stump?) at the appendiceal orifice.  Nonneoplastic colonic mucosa with prominent lymphoid aggregates.    COLONOSCOPY N/A 10/12/2020    Procedure: COLONOSCOPY;  Surgeon: Robb Kwong Jr., MD;  Location: [facility] ENDO;  Service: Endoscopy;  Laterality: N/A;    ESOPHAGOGASTRODUODENOSCOPY      HYSTERECTOMY      INSERTION OF IMPLANTABLE LOOP RECORDER Left 11/12/2020    Procedure: Insertion, Implantable Loop Recorder;  Surgeon: Madan Benitez MD;  Location: [facility] CATH/EP LAB;  Service: Cardiology;  Laterality: Left;    OOPHORECTOMY         Family History   Problem Relation Age of Onset    Pacemaker/defibrilator Mother     Hypertension Mother     No Known Problems Father     Breast cancer Sister 58    Ovarian cancer Maternal Grandmother        Social History     Socioeconomic History    Marital status:    Tobacco Use    Smoking status: Never    Smokeless tobacco: Never   Substance and Sexual Activity    Alcohol use: No    Drug use: No       Current Outpatient Medications   Medication Sig Dispense Refill    amLODIPine (NORVASC) 10 MG tablet TAKE 1 TABLET BY MOUTH EVERY DAY 90 tablet 1    ascorbic acid, vitamin C, (VITAMIN C) 100 MG tablet Vitamin C      aspirin (ASPIR-81 ORAL) Take 1 tablet by mouth once daily.      atorvastatin (LIPITOR) 20 MG tablet TAKE 1 TABLET(20 MG) BY MOUTH EVERY DAY 90  tablet 3    cetirizine (ZYRTEC) 10 MG tablet Take 10 mg by mouth once daily.      cholecalciferol, vitamin D3, 10 mcg (400 unit) Cap capsule Vitamin D3      estrogens, conjugated, (PREMARIN) 0.9 MG Tab TAKE 1 TABLET(0.9 MG) BY MOUTH EVERY DAY 90 tablet 3    famotidine (PEPCID) 20 MG tablet Take 1 tablet (20 mg total) by mouth 2 (two) times daily. 60 tablet 1    FLUZONE HIGHDOSE QUAD 20-21  mcg/0.7 mL Syrg ADM 0.7ML IM UTD      levocetirizine (XYZAL) 5 MG tablet TK 1 T PO  QAM PRF SINUS ALLERGY OR DRIP  6    metoprolol succinate (TOPROL-XL) 50 MG 24 hr tablet Take 1 tablet (50 mg total) by mouth once daily. 90 tablet 1    omega-3 fatty acids/fish oil (FISH OIL-OMEGA-3 FATTY ACIDS) 300-1,000 mg capsule Take 1 capsule by mouth once daily. otc      vitamin D (VITAMIN D3) 1000 units Tab Take 1,000 Units by mouth once daily. OTC      zinc sulfate (ZINC-15) 66 mg Tab zinc       No current facility-administered medications for this visit.       Review of patient's allergies indicates:   Allergen Reactions    Bee sting [allergen ext-venom-honey bee] Swelling    Bee venom protein (honey bee)     Iodine     Iodine and iodide containing products Palpitations       Review of System:   General: no chills, fever, night sweats, weight gain or weight loss  Psychological: no depression or suicidal ideation  Breasts: no new or changing breast lumps, nipple discharge or masses.  Respiratory: no cough, shortness of breath, or wheezing  Cardiovascular: no chest pain or dyspnea on exertion  Gastrointestinal: no abdominal pain, change in bowel habits, or black or bloody stools  Genito-Urinary: no incontinence, urinary frequency/urgency or vulvar/vaginal symptoms, pelvic pain or abnormal vaginal bleeding. POSITIVE VAGINAL DRYNESS  Musculoskeletal: no gait disturbance or muscular weakness     PE:   APPEARANCE: Well nourished, well developed, in no acute distress.  NECK: Neck symmetric without masses or thyromegaly.  NODES: No inguinal  lymph node enlargement.  ABDOMEN: Soft. No tenderness or masses. No hepatosplenomegaly. No hernias.  BREASTS: Symmetrical, no skin changes or visible lesions. No palpable masses, nipple discharge or adenopathy bilaterally.  PELVIC: Normal external female genitalia without lesions. Normal hair distribution. Adequate perineal body, normal urethral meatus. Vagina DRY and POORLY rugated without lesions or discharge. No significant cystocele or rectocele. Uterus and cervix surgically absent. Bimanual exam revealed no masses, tenderness or abnormality.    NOTE  NURSING PERSONAL PRESENT FOR ENTIRE PHYSICAL EXAM     I discussed ATV and supportive care Discussed water based lubricants and options such as Osphena  I rec Mammo screening and will order  BSE monthly  Daily ca  RTO 1 year/prn    I spent a total of 30 minutes on the day of the visit. This includes face to face time and non-face to face time preparing to see the patient (eg, review of tests), obtaining and/or reviewing separately obtained history, documenting clinical information in the electronic or other health record, independently interpreting results and communicating results to the patient/family/caregiver, or care coordinator.

## 2023-05-11 ENCOUNTER — TELEPHONE (OUTPATIENT)
Dept: CARDIOLOGY | Facility: CLINIC | Age: 68
End: 2023-05-11
Payer: MEDICARE

## 2023-05-11 ENCOUNTER — PATIENT MESSAGE (OUTPATIENT)
Dept: ADMINISTRATIVE | Facility: OTHER | Age: 68
End: 2023-05-11
Payer: MEDICARE

## 2023-05-16 ENCOUNTER — OFFICE VISIT (OUTPATIENT)
Dept: CARDIOLOGY | Facility: CLINIC | Age: 68
End: 2023-05-16
Payer: MEDICARE

## 2023-05-16 VITALS
HEART RATE: 69 BPM | SYSTOLIC BLOOD PRESSURE: 140 MMHG | WEIGHT: 153 LBS | OXYGEN SATURATION: 97 % | DIASTOLIC BLOOD PRESSURE: 76 MMHG | BODY MASS INDEX: 26.26 KG/M2

## 2023-05-16 DIAGNOSIS — I47.10 PAROXYSMAL SVT (SUPRAVENTRICULAR TACHYCARDIA): ICD-10-CM

## 2023-05-16 DIAGNOSIS — R29.818 SUSPECTED SLEEP APNEA: ICD-10-CM

## 2023-05-16 DIAGNOSIS — R00.2 PALPITATIONS: Primary | ICD-10-CM

## 2023-05-16 DIAGNOSIS — I48.0 PAF (PAROXYSMAL ATRIAL FIBRILLATION): ICD-10-CM

## 2023-05-16 DIAGNOSIS — R01.1 MURMUR: ICD-10-CM

## 2023-05-16 DIAGNOSIS — I10 ESSENTIAL HYPERTENSION: ICD-10-CM

## 2023-05-16 PROCEDURE — 3288F FALL RISK ASSESSMENT DOCD: CPT | Mod: CPTII,,, | Performed by: NURSE PRACTITIONER

## 2023-05-16 PROCEDURE — 3008F BODY MASS INDEX DOCD: CPT | Mod: CPTII,,, | Performed by: NURSE PRACTITIONER

## 2023-05-16 PROCEDURE — 3288F PR FALLS RISK ASSESSMENT DOCUMENTED: ICD-10-PCS | Mod: CPTII,,, | Performed by: NURSE PRACTITIONER

## 2023-05-16 PROCEDURE — 1101F PT FALLS ASSESS-DOCD LE1/YR: CPT | Mod: CPTII,,, | Performed by: NURSE PRACTITIONER

## 2023-05-16 PROCEDURE — 1101F PR PT FALLS ASSESS DOC 0-1 FALLS W/OUT INJ PAST YR: ICD-10-PCS | Mod: CPTII,,, | Performed by: NURSE PRACTITIONER

## 2023-05-16 PROCEDURE — 3077F SYST BP >= 140 MM HG: CPT | Mod: CPTII,,, | Performed by: NURSE PRACTITIONER

## 2023-05-16 PROCEDURE — 1160F RVW MEDS BY RX/DR IN RCRD: CPT | Mod: CPTII,,, | Performed by: NURSE PRACTITIONER

## 2023-05-16 PROCEDURE — 1160F PR REVIEW ALL MEDS BY PRESCRIBER/CLIN PHARMACIST DOCUMENTED: ICD-10-PCS | Mod: CPTII,,, | Performed by: NURSE PRACTITIONER

## 2023-05-16 PROCEDURE — 93000 ELECTROCARDIOGRAM COMPLETE: CPT | Mod: S$GLB,,, | Performed by: INTERNAL MEDICINE

## 2023-05-16 PROCEDURE — 99214 PR OFFICE/OUTPT VISIT, EST, LEVL IV, 30-39 MIN: ICD-10-PCS | Mod: ,,, | Performed by: NURSE PRACTITIONER

## 2023-05-16 PROCEDURE — 99214 OFFICE O/P EST MOD 30 MIN: CPT | Mod: ,,, | Performed by: NURSE PRACTITIONER

## 2023-05-16 PROCEDURE — 3008F PR BODY MASS INDEX (BMI) DOCUMENTED: ICD-10-PCS | Mod: CPTII,,, | Performed by: NURSE PRACTITIONER

## 2023-05-16 PROCEDURE — 99999 PR PBB SHADOW E&M-EST. PATIENT-LVL IV: CPT | Mod: PBBFAC,,, | Performed by: NURSE PRACTITIONER

## 2023-05-16 PROCEDURE — 3077F PR MOST RECENT SYSTOLIC BLOOD PRESSURE >= 140 MM HG: ICD-10-PCS | Mod: CPTII,,, | Performed by: NURSE PRACTITIONER

## 2023-05-16 PROCEDURE — 3078F PR MOST RECENT DIASTOLIC BLOOD PRESSURE < 80 MM HG: ICD-10-PCS | Mod: CPTII,,, | Performed by: NURSE PRACTITIONER

## 2023-05-16 PROCEDURE — 99999 PR PBB SHADOW E&M-EST. PATIENT-LVL IV: ICD-10-PCS | Mod: PBBFAC,,, | Performed by: NURSE PRACTITIONER

## 2023-05-16 PROCEDURE — 93000 EKG 12-LEAD: ICD-10-PCS | Mod: S$GLB,,, | Performed by: INTERNAL MEDICINE

## 2023-05-16 PROCEDURE — 1159F MED LIST DOCD IN RCRD: CPT | Mod: CPTII,,, | Performed by: NURSE PRACTITIONER

## 2023-05-16 PROCEDURE — 3078F DIAST BP <80 MM HG: CPT | Mod: CPTII,,, | Performed by: NURSE PRACTITIONER

## 2023-05-16 PROCEDURE — 1159F PR MEDICATION LIST DOCUMENTED IN MEDICAL RECORD: ICD-10-PCS | Mod: CPTII,,, | Performed by: NURSE PRACTITIONER

## 2023-05-16 RX ORDER — LANOLIN ALCOHOL/MO/W.PET/CERES
400 CREAM (GRAM) TOPICAL DAILY
Refills: 0
Start: 2023-05-16

## 2023-05-16 RX ORDER — METOPROLOL SUCCINATE 50 MG/1
50 TABLET, EXTENDED RELEASE ORAL DAILY
Qty: 90 TABLET | Refills: 3 | Status: SHIPPED | OUTPATIENT
Start: 2023-05-16 | End: 2023-08-02

## 2023-05-16 RX ORDER — CYCLOBENZAPRINE HCL 5 MG
5 TABLET ORAL DAILY PRN
COMMUNITY
Start: 2023-03-24

## 2023-05-16 NOTE — PROGRESS NOTES
Subjective:    Patient ID:  Marga Pak is a 68 y.o. female   Chief Complaint   Patient presents with    Hypertension    Palpitations       HPI:  Patient presents today for follow up appointment. She has continued to have palpitations intermittently. She recalls having significant palpitations on faisal day. At that time, her loop noted she had Afib. She does report s/s of sleep apnea.       Review of patient's allergies indicates:   Allergen Reactions    Bee sting [allergen ext-venom-honey bee] Swelling    Bee venom protein (honey bee)     Iodine     Iodine and iodide containing products Palpitations       Past Medical History:   Diagnosis Date    Colon torsion     Coronary artery disease     CAD, pt states nild MI    Gastroparesis     GERD (gastroesophageal reflux disease)     Hyperlipidemia     Hypertension     Sciatica      Past Surgical History:   Procedure Laterality Date    ABDOMINAL SURGERY      APPENDECTOMY      COLON SURGERY      due to torsion, clipped adhesions    COLONOSCOPY  08/13/2014    JUNIOR.   One 1 mm polyp in the distal ascending colon.  Nonneoplastic colonic mucosa with reactive/regenerative changes.  Nodule (inverted stump?) at the appendiceal orifice.  Nonneoplastic colonic mucosa with prominent lymphoid aggregates.    COLONOSCOPY N/A 10/12/2020    Procedure: COLONOSCOPY;  Surgeon: Robb Kwong Jr., MD;  Location: [facility] ENDO;  Service: Endoscopy;  Laterality: N/A;    ESOPHAGOGASTRODUODENOSCOPY      HYSTERECTOMY      INSERTION OF IMPLANTABLE LOOP RECORDER Left 11/12/2020    Procedure: Insertion, Implantable Loop Recorder;  Surgeon: Madan Benitez MD;  Location: [facility] CATH/EP LAB;  Service: Cardiology;  Laterality: Left;    OOPHORECTOMY       Social History     Tobacco Use    Smoking status: Never    Smokeless tobacco: Never   Substance Use Topics    Alcohol use: No    Drug use: No     Family History   Problem Relation Age of Onset    Pacemaker/defibrilator Mother      Hypertension Mother     No Known Problems Father     Breast cancer Sister 58    Ovarian cancer Maternal Grandmother         Review of Systems:   Constitution: Negative for diaphoresis and fever.   HEENT: Negative for nosebleeds.    Cardiovascular: Negative for chest pain       No dyspnea on exertion       No leg swelling        + palpitations  Respiratory: Negative for shortness of breath and wheezing.    Hematologic/Lymphatic: Negative for bleeding problem. Does not bruise/bleed easily.   Skin: Negative for color change and rash.   Musculoskeletal: Negative for falls and myalgias.   Gastrointestinal: Negative for hematemesis and hematochezia.   Genitourinary: Negative for hematuria.   Neurological: Negative for dizziness and light-headedness.   Psychiatric/Behavioral: Negative for altered mental status and memory loss.          Objective:        Vitals:    05/16/23 0852   BP: (!) 140/76   Pulse: 69       Lab Results   Component Value Date    WBC 6.89 11/12/2020    HGB 12.0 11/12/2020    HCT 36.6 (L) 11/12/2020     11/12/2020    CHOL 237 (H) 11/14/2017    TRIG 78 11/14/2017    HDL 75 11/14/2017    ALT 11 11/12/2020    AST 19 11/12/2020     11/12/2020    K 4.0 11/12/2020     11/12/2020    CREATININE 0.7 11/12/2020    BUN 19 11/12/2020    CO2 29 11/12/2020    TSH 1.630 11/10/2020    INR 1.1 11/12/2020        ECHOCARDIOGRAM RESULTS  Results for orders placed during the hospital encounter of 11/10/20    Echo Color Flow Doppler? Yes; Bubble Contrast? No    Interpretation Summary  · There is left ventricular concentric remodeling.  · The left ventricle is normal in size with normal systolic function. The estimated ejection fraction is 60%.  · Normal left ventricular diastolic function.  · Normal right ventricular size with normal right ventricular systolic function.  · Mild tricuspid regurgitation.  · Normal central venous pressure (3 mmHg).  · The estimated PA systolic pressure is 32  mmHg.        CURRENT/PREVIOUS VISIT EKG  Results for orders placed or performed in visit on 01/17/23   IN OFFICE EKG 12-LEAD (to [place])    Collection Time: 01/17/23 10:00 AM    Narrative    Test Reason : R94.31,    Vent. Rate : 064 BPM     Atrial Rate : 064 BPM     P-R Int : 168 ms          QRS Dur : 070 ms      QT Int : 394 ms       P-R-T Axes : 056 074 004 degrees     QTc Int : 406 ms    Normal sinus rhythm  Nonspecific ST abnormality Inferior leads  When compared with ECG of 16-AUG-2021 13:55,  Questionable change in The axis  Nonspecific T wave abnormality now evident in Anterior leads  Confirmed by Madan Benitez MD ([id number]) on 1/29/2023 3:23:54 PM    Referred By:             Confirmed By:Madan Benitez MD     No valid procedures specified.   Results for orders placed during the hospital encounter of 02/16/20    Treadmill Stress Test    Interpretation Summary    The EKG portion of this study is negative for ischemia.    The patient reported no chest pain during the stress test.    Arrhythmias during stress: occasional PACs, atrial fibrillation.    The exercise capacity was above average.      Physical Exam:  CONSTITUTIONAL: No fever, no chills  HEENT: Normocephalic, atraumatic,pupils reactive to light                 NECK:  No JVD no carotid bruit  CVS: S1S2+, RRR  LUNGS: Clear  ABDOMEN: Soft, NT, BS+  EXTREMITIES: No cyanosis, edema  : No butler catheter  NEURO: AAO X 3  PSY: Normal affect      Medication List with Changes/Refills   New Medications    APIXABAN (ELIQUIS) 5 MG TAB    Take 1 tablet (5 mg total) by mouth 2 (two) times daily.    MAGNESIUM OXIDE (MAG-OX) 400 MG (241.3 MG MAGNESIUM) TABLET    Take 1 tablet (400 mg total) by mouth once daily.   Current Medications    AMLODIPINE (NORVASC) 10 MG TABLET    TAKE 1 TABLET BY MOUTH EVERY DAY    ASCORBIC ACID, VITAMIN C, (VITAMIN C) 100 MG TABLET    Vitamin C    ATORVASTATIN (LIPITOR) 20 MG TABLET    TAKE 1 TABLET(20 MG) BY MOUTH EVERY DAY    CETIRIZINE  (ZYRTEC) 10 MG TABLET    Take 10 mg by mouth once daily.    CHOLECALCIFEROL, VITAMIN D3, 10 MCG (400 UNIT) CAP CAPSULE    Vitamin D3    CYCLOBENZAPRINE (FLEXERIL) 5 MG TABLET    Take 5 mg by mouth daily as needed.    FLUZONE HIGHDOSE QUAD 20-21  MCG/0.7 ML SYRG    ADM 0.7ML IM UTD    LEVOCETIRIZINE (XYZAL) 5 MG TABLET    TK 1 T PO  QAM PRF SINUS ALLERGY OR DRIP   Changed and/or Refilled Medications    Modified Medication Previous Medication    METOPROLOL SUCCINATE (TOPROL-XL) 50 MG 24 HR TABLET metoprolol succinate (TOPROL-XL) 50 MG 24 hr tablet       Take 1 tablet (50 mg total) by mouth once daily.    Take 1 tablet (50 mg total) by mouth once daily.   Discontinued Medications    ASPIRIN (ASPIR-81 ORAL)    Take 1 tablet by mouth once daily.    ESTROGENS, CONJUGATED, (PREMARIN) 0.9 MG TAB    TAKE 1 TABLET(0.9 MG) BY MOUTH EVERY DAY    FAMOTIDINE (PEPCID) 20 MG TABLET    Take 1 tablet (20 mg total) by mouth 2 (two) times daily.    OMEGA-3 FATTY ACIDS/FISH OIL (FISH OIL-OMEGA-3 FATTY ACIDS) 300-1,000 MG CAPSULE    Take 1 capsule by mouth once daily. otc    VITAMIN D (VITAMIN D3) 1000 UNITS TAB    Take 1,000 Units by mouth once daily. OTC    ZINC SULFATE (ZINC-15) 66 MG TAB    zinc             Assessment:       1. Palpitations    2. Paroxysmal SVT (supraventricular tachycardia)    3. Essential hypertension    4. PAF (paroxysmal atrial fibrillation)    5. Suspected sleep apnea    6. Murmur         Plan:     Problem List Items Addressed This Visit          Unprioritized    Essential hypertension    Relevant Medications    metoprolol succinate (TOPROL-XL) 50 MG 24 hr tablet    Palpitations - Primary    Relevant Medications    metoprolol succinate (TOPROL-XL) 50 MG 24 hr tablet    magnesium oxide (MAG-OX) 400 mg (241.3 mg magnesium) tablet    Other Relevant Orders    IN OFFICE EKG 12-LEAD (to [place])    Paroxysmal SVT (supraventricular tachycardia)    Relevant Medications    metoprolol succinate (TOPROL-XL) 50 MG 24 hr  tablet    magnesium oxide (MAG-OX) 400 mg (241.3 mg magnesium) tablet    Other Relevant Orders    Ambulatory referral/consult to Electrophysiology    PAF (paroxysmal atrial fibrillation)    Current Assessment & Plan     Note on loop recorder. Recommend Eliquis 5 mg po BID. She works in construction. Recommend avoiding falls, head injuries, and other injuries.   [id number] VASc is 3. Discussed risks of strokes associated with Afib.   Refer to EP for further discussion and evaluation as well.            Relevant Medications    metoprolol succinate (TOPROL-XL) 50 MG 24 hr tablet    magnesium oxide (MAG-OX) 400 mg (241.3 mg magnesium) tablet    apixaban (ELIQUIS) 5 mg Tab    Other Relevant Orders    Ambulatory referral/consult to Electrophysiology    Echo    Suspected sleep apnea    Current Assessment & Plan     Will set up for home sleep study.           Relevant Orders    Echo     Other Visit Diagnoses       Murmur        Relevant Orders    Echo            Follow up in about 3 months (around 8/16/2023).

## 2023-05-16 NOTE — ASSESSMENT & PLAN NOTE
Note on loop recorder. Recommend Eliquis 5 mg po BID. She works in construction. Recommend avoiding falls, head injuries, and other injuries.   [id number] VASc is 3. Discussed risks of strokes associated with Afib.   Refer to EP for further discussion and evaluation as well.

## 2023-07-28 DIAGNOSIS — I47.10 PAROXYSMAL SVT (SUPRAVENTRICULAR TACHYCARDIA): Primary | ICD-10-CM

## 2023-07-31 NOTE — PROGRESS NOTES
Subjective:     HPI    I had the pleasure of seeing Marga Pak in consultation at your request for the evaluation of AF. She is a 68F with a history of palpitations, which led to ILR implantation in 11/2020. Recent device checks have indicated she is having paroxysms of AF (lifetime burden 0.4%, recent burden in 1-3% range). Longest AF event 6h 8min on 12/25/2023. Currently on eliquis for stroke prophylaxis, and toprol xl 50 mg daily for rate control.    Exercise stress in 11/2020 showed EF 60% and no evidence of stress induced ischemia.    Review of Systems   Constitutional: Negative for decreased appetite, malaise/fatigue, weight gain and weight loss.   HENT:  Negative for sore throat.    Eyes:  Negative for blurred vision.   Cardiovascular:  Positive for palpitations. Negative for chest pain, dyspnea on exertion, irregular heartbeat, leg swelling, near-syncope, orthopnea, paroxysmal nocturnal dyspnea and syncope.   Respiratory:  Negative for shortness of breath.    Skin:  Negative for rash.   Musculoskeletal:  Negative for arthritis.   Gastrointestinal:  Negative for abdominal pain.   Neurological:  Negative for focal weakness.   Psychiatric/Behavioral:  Negative for altered mental status.        Objective:   Physical Exam  Vitals and nursing note reviewed.   Constitutional:       General: She is not in acute distress.     Appearance: She is well-developed.   HENT:      Head: Normocephalic and atraumatic.   Eyes:      General: No scleral icterus.     Conjunctiva/sclera: Conjunctivae normal.      Pupils: Pupils are equal, round, and reactive to light.   Neck:      Thyroid: No thyromegaly.      Vascular: No JVD.   Cardiovascular:      Rate and Rhythm: Normal rate and regular rhythm.      Pulses: Intact distal pulses.      Heart sounds: Normal heart sounds. No murmur heard.     No friction rub. No gallop.   Pulmonary:      Effort: Pulmonary effort is normal. No respiratory distress.      Breath sounds:  Normal breath sounds.   Abdominal:      General: Bowel sounds are normal. There is no distension.      Palpations: Abdomen is soft.   Musculoskeletal:      Cervical back: Normal range of motion and neck supple.   Skin:     General: Skin is warm and dry.   Neurological:      Mental Status: She is alert and oriented to person, place, and time.   Psychiatric:         Behavior: Behavior normal.         Assessment:      1. Paroxysmal SVT (supraventricular tachycardia)    2. Essential hypertension    3. PAF (paroxysmal atrial fibrillation)        Plan:     In summary, Marga Nuñez is  68M with PAF. Her VLY1LP5-SVGi Score is 3 (age, female, HTN) so she should continue eliquis.    We discussed in detail the pathophysiology of AF as well as the myriad of treatment options available to manage it including antiarrhythmics and catheter based therapies. Plan at this time is to start rythmol sr 225 mg bid, and reduce toprol to 25 mg daily.    Follow-up 3 months.    Thank you for allowing me to participate in the care of this patient. Please do not hesitate to call me with any questions or concerns.

## 2023-08-02 ENCOUNTER — OFFICE VISIT (OUTPATIENT)
Dept: CARDIOLOGY | Facility: CLINIC | Age: 68
End: 2023-08-02
Payer: MEDICARE

## 2023-08-02 VITALS
HEIGHT: 64 IN | WEIGHT: 153 LBS | HEART RATE: 72 BPM | DIASTOLIC BLOOD PRESSURE: 72 MMHG | RESPIRATION RATE: 16 BRPM | SYSTOLIC BLOOD PRESSURE: 120 MMHG | OXYGEN SATURATION: 99 % | BODY MASS INDEX: 26.12 KG/M2

## 2023-08-02 DIAGNOSIS — I10 ESSENTIAL HYPERTENSION: ICD-10-CM

## 2023-08-02 DIAGNOSIS — I47.10 PAROXYSMAL SVT (SUPRAVENTRICULAR TACHYCARDIA): Primary | ICD-10-CM

## 2023-08-02 DIAGNOSIS — I48.0 PAF (PAROXYSMAL ATRIAL FIBRILLATION): ICD-10-CM

## 2023-08-02 DIAGNOSIS — R00.2 PALPITATIONS: ICD-10-CM

## 2023-08-02 PROCEDURE — 1160F RVW MEDS BY RX/DR IN RCRD: CPT | Mod: CPTII,S$GLB,, | Performed by: INTERNAL MEDICINE

## 2023-08-02 PROCEDURE — 3288F PR FALLS RISK ASSESSMENT DOCUMENTED: ICD-10-PCS | Mod: CPTII,S$GLB,, | Performed by: INTERNAL MEDICINE

## 2023-08-02 PROCEDURE — 3078F DIAST BP <80 MM HG: CPT | Mod: CPTII,S$GLB,, | Performed by: INTERNAL MEDICINE

## 2023-08-02 PROCEDURE — 99205 PR OFFICE/OUTPT VISIT, NEW, LEVL V, 60-74 MIN: ICD-10-PCS | Mod: S$GLB,,, | Performed by: INTERNAL MEDICINE

## 2023-08-02 PROCEDURE — 3074F SYST BP LT 130 MM HG: CPT | Mod: CPTII,S$GLB,, | Performed by: INTERNAL MEDICINE

## 2023-08-02 PROCEDURE — 3008F PR BODY MASS INDEX (BMI) DOCUMENTED: ICD-10-PCS | Mod: CPTII,S$GLB,, | Performed by: INTERNAL MEDICINE

## 2023-08-02 PROCEDURE — 1159F MED LIST DOCD IN RCRD: CPT | Mod: CPTII,S$GLB,, | Performed by: INTERNAL MEDICINE

## 2023-08-02 PROCEDURE — 3008F BODY MASS INDEX DOCD: CPT | Mod: CPTII,S$GLB,, | Performed by: INTERNAL MEDICINE

## 2023-08-02 PROCEDURE — 1159F PR MEDICATION LIST DOCUMENTED IN MEDICAL RECORD: ICD-10-PCS | Mod: CPTII,S$GLB,, | Performed by: INTERNAL MEDICINE

## 2023-08-02 PROCEDURE — 3288F FALL RISK ASSESSMENT DOCD: CPT | Mod: CPTII,S$GLB,, | Performed by: INTERNAL MEDICINE

## 2023-08-02 PROCEDURE — 1160F PR REVIEW ALL MEDS BY PRESCRIBER/CLIN PHARMACIST DOCUMENTED: ICD-10-PCS | Mod: CPTII,S$GLB,, | Performed by: INTERNAL MEDICINE

## 2023-08-02 PROCEDURE — 1101F PT FALLS ASSESS-DOCD LE1/YR: CPT | Mod: CPTII,S$GLB,, | Performed by: INTERNAL MEDICINE

## 2023-08-02 PROCEDURE — 3074F PR MOST RECENT SYSTOLIC BLOOD PRESSURE < 130 MM HG: ICD-10-PCS | Mod: CPTII,S$GLB,, | Performed by: INTERNAL MEDICINE

## 2023-08-02 PROCEDURE — 1101F PR PT FALLS ASSESS DOC 0-1 FALLS W/OUT INJ PAST YR: ICD-10-PCS | Mod: CPTII,S$GLB,, | Performed by: INTERNAL MEDICINE

## 2023-08-02 PROCEDURE — 99205 OFFICE O/P NEW HI 60 MIN: CPT | Mod: S$GLB,,, | Performed by: INTERNAL MEDICINE

## 2023-08-02 PROCEDURE — 3078F PR MOST RECENT DIASTOLIC BLOOD PRESSURE < 80 MM HG: ICD-10-PCS | Mod: CPTII,S$GLB,, | Performed by: INTERNAL MEDICINE

## 2023-08-02 RX ORDER — PROPAFENONE HYDROCHLORIDE 225 MG/1
225 CAPSULE, EXTENDED RELEASE ORAL EVERY 12 HOURS
Qty: 60 CAPSULE | Refills: 11 | Status: SHIPPED | OUTPATIENT
Start: 2023-08-02 | End: 2024-08-01

## 2023-08-02 RX ORDER — METOPROLOL SUCCINATE 25 MG/1
50 TABLET, EXTENDED RELEASE ORAL DAILY
Qty: 90 TABLET | Refills: 3 | Status: SHIPPED | OUTPATIENT
Start: 2023-08-02 | End: 2023-08-02

## 2023-08-02 RX ORDER — METOPROLOL SUCCINATE 25 MG/1
25 TABLET, EXTENDED RELEASE ORAL DAILY
Qty: 90 TABLET | Refills: 3 | Status: SHIPPED | OUTPATIENT
Start: 2023-08-02 | End: 2023-11-09

## 2023-08-22 ENCOUNTER — HOSPITAL ENCOUNTER (OUTPATIENT)
Dept: CARDIOLOGY | Facility: HOSPITAL | Age: 68
Discharge: HOME | End: 2023-08-22
Attending: NURSE PRACTITIONER
Payer: MEDICARE

## 2023-08-22 VITALS — HEIGHT: 64 IN | WEIGHT: 153 LBS | BODY MASS INDEX: 26.12 KG/M2

## 2023-08-22 DIAGNOSIS — R29.818 SUSPECTED SLEEP APNEA: ICD-10-CM

## 2023-08-22 DIAGNOSIS — R01.1 MURMUR: ICD-10-CM

## 2023-08-22 DIAGNOSIS — I48.0 PAF (PAROXYSMAL ATRIAL FIBRILLATION): ICD-10-CM

## 2023-08-22 PROCEDURE — 93306 TTE W/DOPPLER COMPLETE: CPT | Mod: 26,,, | Performed by: INTERNAL MEDICINE

## 2023-08-22 PROCEDURE — 93306 ECHO (CUPID ONLY): ICD-10-PCS | Mod: 26,,, | Performed by: INTERNAL MEDICINE

## 2023-08-22 PROCEDURE — 93306 TTE W/DOPPLER COMPLETE: CPT

## 2023-08-25 LAB
AORTIC ROOT ANNULUS: 2.9 CM
AORTIC VALVE CUSP SEPERATION: 1.8 CM
AV INDEX (PROSTH): 0.94
AV MEAN GRADIENT: 4 MMHG
AV PEAK GRADIENT: 7 MMHG
AV VALVE AREA BY VELOCITY RATIO: 2.87 CM²
AV VALVE AREA: 2.95 CM²
AV VELOCITY RATIO: 0.91
BSA FOR ECHO PROCEDURE: 1.77 M2
CV ECHO LV RWT: 0.45 CM
DOP CALC AO PEAK VEL: 1.28 M/S
DOP CALC AO VTI: 27.5 CM
DOP CALC LVOT AREA: 3.1 CM2
DOP CALC LVOT DIAMETER: 2 CM
DOP CALC LVOT PEAK VEL: 1.17 M/S
DOP CALC LVOT STROKE VOLUME: 81.01 CM3
DOP CALCLVOT PEAK VEL VTI: 25.8 CM
E WAVE DECELERATION TIME: 222 MSEC
E/A RATIO: 1.14
E/E' RATIO: 6.96 M/S
ECHO LV POSTERIOR WALL: 0.86 CM (ref 0.6–1.1)
FRACTIONAL SHORTENING: 41 % (ref 28–44)
INTERVENTRICULAR SEPTUM: 1.14 CM (ref 0.6–1.1)
IVRT: 100 MSEC
LEFT ATRIUM SIZE: 3.4 CM
LEFT INTERNAL DIMENSION IN SYSTOLE: 2.25 CM (ref 2.1–4)
LEFT VENTRICLE DIASTOLIC VOLUME INDEX: 36.06 ML/M2
LEFT VENTRICLE DIASTOLIC VOLUME: 63.1 ML
LEFT VENTRICLE MASS INDEX: 68 G/M2
LEFT VENTRICLE SYSTOLIC VOLUME INDEX: 9.8 ML/M2
LEFT VENTRICLE SYSTOLIC VOLUME: 17.1 ML
LEFT VENTRICULAR INTERNAL DIMENSION IN DIASTOLE: 3.83 CM (ref 3.5–6)
LEFT VENTRICULAR MASS: 118.72 G
LV LATERAL E/E' RATIO: 5.8 M/S
LV SEPTAL E/E' RATIO: 8.7 M/S
LVOT MG: 3 MMHG
LVOT MV: 0.78 CM/S
MV PEAK A VEL: 0.76 M/S
MV PEAK E VEL: 0.87 M/S
MV STENOSIS PRESSURE HALF TIME: 52 MS
MV VALVE AREA P 1/2 METHOD: 4.23 CM2
PISA TR MAX VEL: 2.36 M/S
RA PRESSURE ESTIMATED: 3 MMHG
RIGHT VENTRICULAR END-DIASTOLIC DIMENSION: 2.51 CM
RV TB RVSP: 5 MMHG
TDI LATERAL: 0.15 M/S
TDI SEPTAL: 0.1 M/S
TDI: 0.13 M/S
TR MAX PG: 22 MMHG
TV REST PULMONARY ARTERY PRESSURE: 25 MMHG
Z-SCORE OF LEFT VENTRICULAR DIMENSION IN END DIASTOLE: -2.34
Z-SCORE OF LEFT VENTRICULAR DIMENSION IN END SYSTOLE: -2.26

## 2024-03-09 ENCOUNTER — PATIENT MESSAGE (OUTPATIENT)
Dept: ADMINISTRATIVE | Facility: OTHER | Age: 69
End: 2024-03-09
Payer: MEDICARE

## 2024-04-09 DIAGNOSIS — Z12.31 VISIT FOR SCREENING MAMMOGRAM: Primary | ICD-10-CM

## 2024-05-03 NOTE — PROGRESS NOTES
Subjective:     HPI    I had the pleasure of seeing Marga Pak in follow-up for her history of AF. Last seen in 4/2023. She is a 69F with a history of palpitations, which led to ILR implantation in 11/2020. Recent device checks have indicated she is having paroxysms of AF (lifetime burden 0.4%, recent burden in 1-3% range). Longest AF event 6h 8min on 12/25/2023. On eliquis for stroke prophylaxis, and toprol xl 50 mg daily for rate control.    Exercise stress in 11/2020 showed EF 60% and no evidence of stress induced ischemia.    At visit in 8/2023 we started rythmol sr 225 mg bid and cut toprol to 25 mg daily.    Today in the office Ms. Pak feels well. Some constipation, nausea otherwise feeling ok. No palps.    My interpretation of today's ECG is NSR 67 bpm with QRSd 82 ms.    Review of Systems   Constitutional: Negative for decreased appetite, malaise/fatigue, weight gain and weight loss.   HENT:  Negative for sore throat.    Eyes:  Negative for blurred vision.   Cardiovascular:  Positive for palpitations. Negative for chest pain, dyspnea on exertion, irregular heartbeat, leg swelling, near-syncope, orthopnea, paroxysmal nocturnal dyspnea and syncope.   Respiratory:  Negative for shortness of breath.    Skin:  Negative for rash.   Musculoskeletal:  Negative for arthritis.   Gastrointestinal:  Negative for abdominal pain.   Neurological:  Negative for focal weakness.   Psychiatric/Behavioral:  Negative for altered mental status.        Objective:   Physical Exam  Vitals and nursing note reviewed.   Constitutional:       General: She is not in acute distress.     Appearance: She is well-developed.   HENT:      Head: Normocephalic and atraumatic.   Eyes:      General: No scleral icterus.     Conjunctiva/sclera: Conjunctivae normal.      Pupils: Pupils are equal, round, and reactive to light.   Neck:      Thyroid: No thyromegaly.      Vascular: No JVD.   Cardiovascular:      Rate and Rhythm: Normal rate and  regular rhythm.      Pulses: Intact distal pulses.      Heart sounds: Normal heart sounds. No murmur heard.     No friction rub. No gallop.   Pulmonary:      Effort: Pulmonary effort is normal. No respiratory distress.      Breath sounds: Normal breath sounds.   Abdominal:      General: Bowel sounds are normal. There is no distension.      Palpations: Abdomen is soft.   Musculoskeletal:      Cervical back: Normal range of motion and neck supple.   Skin:     General: Skin is warm and dry.   Neurological:      Mental Status: She is alert and oriented to person, place, and time.   Psychiatric:         Behavior: Behavior normal.         Assessment:      1. Essential hypertension    2. PAF (paroxysmal atrial fibrillation)    3. Mixed hyperlipidemia    4. Paroxysmal SVT (supraventricular tachycardia)        Plan:     In summary, Marga Nuñez is  69M with PAF. Her [id number] Score is 3 (age, female, HTN) so she should continue eliquis.    Plan at this time is to continue rythmol sr 225 mg bid, 2 week Zio in 6 months, follow-up 12 months. Pt not interested in loop removal.    Thank you for allowing me to participate in the care of this patient. Please do not hesitate to call me with any questions or concerns.

## 2024-05-06 ENCOUNTER — HOSPITAL ENCOUNTER (OUTPATIENT)
Dept: RADIOLOGY | Facility: HOSPITAL | Age: 69
Discharge: HOME | End: 2024-05-06
Attending: SPECIALIST
Payer: MEDICARE

## 2024-05-06 DIAGNOSIS — Z12.31 VISIT FOR SCREENING MAMMOGRAM: ICD-10-CM

## 2024-05-06 PROCEDURE — 77067 SCR MAMMO BI INCL CAD: CPT | Mod: TC,PO

## 2024-05-06 PROCEDURE — 77063 BREAST TOMOSYNTHESIS BI: CPT | Mod: 26,,, | Performed by: RADIOLOGY

## 2024-05-06 PROCEDURE — 77067 SCR MAMMO BI INCL CAD: CPT | Mod: 26,,, | Performed by: RADIOLOGY

## 2024-05-08 ENCOUNTER — OFFICE VISIT (OUTPATIENT)
Dept: CARDIOLOGY | Facility: CLINIC | Age: 69
End: 2024-05-08
Payer: MEDICARE

## 2024-05-08 VITALS
HEART RATE: 69 BPM | SYSTOLIC BLOOD PRESSURE: 132 MMHG | BODY MASS INDEX: 24.4 KG/M2 | RESPIRATION RATE: 16 BRPM | WEIGHT: 142.94 LBS | OXYGEN SATURATION: 99 % | HEIGHT: 64 IN | DIASTOLIC BLOOD PRESSURE: 80 MMHG

## 2024-05-08 DIAGNOSIS — E78.2 MIXED HYPERLIPIDEMIA: ICD-10-CM

## 2024-05-08 DIAGNOSIS — I48.0 PAF (PAROXYSMAL ATRIAL FIBRILLATION): ICD-10-CM

## 2024-05-08 DIAGNOSIS — I10 ESSENTIAL HYPERTENSION: Primary | ICD-10-CM

## 2024-05-08 DIAGNOSIS — I47.10 PAROXYSMAL SVT (SUPRAVENTRICULAR TACHYCARDIA): ICD-10-CM

## 2024-05-08 PROCEDURE — 3288F FALL RISK ASSESSMENT DOCD: CPT | Mod: CPTII,S$GLB,, | Performed by: INTERNAL MEDICINE

## 2024-05-08 PROCEDURE — 93005 ELECTROCARDIOGRAM TRACING: CPT | Mod: S$GLB,,, | Performed by: INTERNAL MEDICINE

## 2024-05-08 PROCEDURE — 99214 OFFICE O/P EST MOD 30 MIN: CPT | Mod: S$GLB,,, | Performed by: INTERNAL MEDICINE

## 2024-05-08 PROCEDURE — 93010 ELECTROCARDIOGRAM REPORT: CPT | Mod: S$GLB,,, | Performed by: INTERNAL MEDICINE

## 2024-05-08 PROCEDURE — 3075F SYST BP GE 130 - 139MM HG: CPT | Mod: CPTII,S$GLB,, | Performed by: INTERNAL MEDICINE

## 2024-05-08 PROCEDURE — 1159F MED LIST DOCD IN RCRD: CPT | Mod: CPTII,S$GLB,, | Performed by: INTERNAL MEDICINE

## 2024-05-08 PROCEDURE — 1101F PT FALLS ASSESS-DOCD LE1/YR: CPT | Mod: CPTII,S$GLB,, | Performed by: INTERNAL MEDICINE

## 2024-05-08 PROCEDURE — 3079F DIAST BP 80-89 MM HG: CPT | Mod: CPTII,S$GLB,, | Performed by: INTERNAL MEDICINE

## 2024-05-08 PROCEDURE — 99999 PR PBB SHADOW E&M-EST. PATIENT-LVL III: CPT | Mod: PBBFAC,,, | Performed by: INTERNAL MEDICINE

## 2024-05-08 PROCEDURE — 3008F BODY MASS INDEX DOCD: CPT | Mod: CPTII,S$GLB,, | Performed by: INTERNAL MEDICINE

## 2024-05-18 DIAGNOSIS — R00.2 PALPITATIONS: ICD-10-CM

## 2024-05-18 DIAGNOSIS — I48.0 PAF (PAROXYSMAL ATRIAL FIBRILLATION): ICD-10-CM

## 2024-05-18 DIAGNOSIS — I10 ESSENTIAL HYPERTENSION: ICD-10-CM

## 2024-05-18 DIAGNOSIS — I47.10 PAROXYSMAL SVT (SUPRAVENTRICULAR TACHYCARDIA): ICD-10-CM

## 2024-05-20 RX ORDER — METOPROLOL SUCCINATE 50 MG/1
50 TABLET, EXTENDED RELEASE ORAL
Qty: 90 TABLET | Refills: 3 | OUTPATIENT
Start: 2024-05-20

## 2024-05-20 RX ORDER — APIXABAN 5 MG/1
5 TABLET, FILM COATED ORAL 2 TIMES DAILY
Qty: 180 TABLET | Refills: 3 | OUTPATIENT
Start: 2024-05-20

## 2024-05-20 NOTE — TELEPHONE ENCOUNTER
Patient request refill she stated that she will request refills with primary since she is in town

## 2024-05-23 LAB
OHS QRS DURATION: 82 MS
OHS QTC CALCULATION: 422 MS

## 2024-06-12 DIAGNOSIS — I48.0 PAF (PAROXYSMAL ATRIAL FIBRILLATION): ICD-10-CM

## 2024-07-01 ENCOUNTER — OFFICE VISIT (OUTPATIENT)
Dept: CARDIOLOGY | Facility: CLINIC | Age: 69
End: 2024-07-01
Payer: MEDICARE

## 2024-07-01 VITALS
DIASTOLIC BLOOD PRESSURE: 75 MMHG | OXYGEN SATURATION: 96 % | BODY MASS INDEX: 24.59 KG/M2 | HEIGHT: 64 IN | WEIGHT: 144.06 LBS | HEART RATE: 78 BPM | SYSTOLIC BLOOD PRESSURE: 123 MMHG

## 2024-07-01 DIAGNOSIS — G89.29 CHRONIC RIGHT-SIDED LOW BACK PAIN WITH RIGHT-SIDED SCIATICA: ICD-10-CM

## 2024-07-01 DIAGNOSIS — I47.10 PAROXYSMAL SVT (SUPRAVENTRICULAR TACHYCARDIA): Primary | ICD-10-CM

## 2024-07-01 DIAGNOSIS — E78.2 MIXED HYPERLIPIDEMIA: ICD-10-CM

## 2024-07-01 DIAGNOSIS — I48.0 PAF (PAROXYSMAL ATRIAL FIBRILLATION): ICD-10-CM

## 2024-07-01 DIAGNOSIS — M54.41 CHRONIC RIGHT-SIDED LOW BACK PAIN WITH RIGHT-SIDED SCIATICA: ICD-10-CM

## 2024-07-01 DIAGNOSIS — I10 ESSENTIAL HYPERTENSION: ICD-10-CM

## 2024-07-01 PROBLEM — M54.9 CHRONIC BACK PAIN: Status: ACTIVE | Noted: 2024-07-01

## 2024-07-01 PROCEDURE — 3078F DIAST BP <80 MM HG: CPT | Mod: CPTII,S$GLB,, | Performed by: NURSE PRACTITIONER

## 2024-07-01 PROCEDURE — 1159F MED LIST DOCD IN RCRD: CPT | Mod: CPTII,S$GLB,, | Performed by: NURSE PRACTITIONER

## 2024-07-01 PROCEDURE — 1101F PT FALLS ASSESS-DOCD LE1/YR: CPT | Mod: CPTII,S$GLB,, | Performed by: NURSE PRACTITIONER

## 2024-07-01 PROCEDURE — 1126F AMNT PAIN NOTED NONE PRSNT: CPT | Mod: CPTII,S$GLB,, | Performed by: NURSE PRACTITIONER

## 2024-07-01 PROCEDURE — 3008F BODY MASS INDEX DOCD: CPT | Mod: CPTII,S$GLB,, | Performed by: NURSE PRACTITIONER

## 2024-07-01 PROCEDURE — 99999 PR PBB SHADOW E&M-EST. PATIENT-LVL III: CPT | Mod: PBBFAC,,, | Performed by: NURSE PRACTITIONER

## 2024-07-01 PROCEDURE — 3288F FALL RISK ASSESSMENT DOCD: CPT | Mod: CPTII,S$GLB,, | Performed by: NURSE PRACTITIONER

## 2024-07-01 PROCEDURE — 99214 OFFICE O/P EST MOD 30 MIN: CPT | Mod: S$GLB,,, | Performed by: NURSE PRACTITIONER

## 2024-07-01 PROCEDURE — 3074F SYST BP LT 130 MM HG: CPT | Mod: CPTII,S$GLB,, | Performed by: NURSE PRACTITIONER

## 2024-07-01 RX ORDER — GABAPENTIN 300 MG/1
300 CAPSULE ORAL 3 TIMES DAILY
COMMUNITY
Start: 2024-06-22

## 2024-07-01 NOTE — ASSESSMENT & PLAN NOTE
Considering a back procedure to see if there will be improvement in her pain and in her drop foot.

## 2024-07-01 NOTE — PROGRESS NOTES
Subjective:    Patient ID:  Marga Pak is a 69 y.o. female.  Chief Complaint   Patient presents with    Follow-up     6 month f/u no issues stated by pt        HPI:  Patient presents today for follow-up appointment.  Patient has no complaints of chest pain, dizziness, shortness of breath, palpitations, or syncope.  Patient has been doing well and taking medications as ordered.  Denies any recent falls or head injuries.  Denies any blood in the stool or in the urine. She does have some mobility issues and has had falls previously. She uses a cane but has drop foot at times and sciatica. She is considering back surgery.       Review of patient's allergies indicates:   Allergen Reactions    Bee sting [allergen ext-venom-honey bee] Swelling    Bee venom protein (honey bee)     Iodine     Iodine and iodide containing products Palpitations       Past Medical History:   Diagnosis Date    Colon torsion     Coronary artery disease     CAD, pt states nild MI    Gastroparesis     GERD (gastroesophageal reflux disease)     Hyperlipidemia     Hypertension     Sciatica      Past Surgical History:   Procedure Laterality Date    ABDOMINAL SURGERY      APPENDECTOMY      COLON SURGERY      due to torsion, clipped adhesions    COLONOSCOPY  08/13/2014    JUNIOR.   One 1 mm polyp in the distal ascending colon.  Nonneoplastic colonic mucosa with reactive/regenerative changes.  Nodule (inverted stump?) at the appendiceal orifice.  Nonneoplastic colonic mucosa with prominent lymphoid aggregates.    COLONOSCOPY N/A 10/12/2020    Procedure: COLONOSCOPY;  Surgeon: Robb Kwong Jr., MD;  Location: [facility];  Service: Endoscopy;  Laterality: N/A;    ESOPHAGOGASTRODUODENOSCOPY      HYSTERECTOMY      INSERTION OF IMPLANTABLE LOOP RECORDER Left 11/12/2020    Procedure: Insertion, Implantable Loop Recorder;  Surgeon: Madan Benitez MD;  Location: [facility] CATH/EP LAB;  Service: Cardiology;  Laterality: Left;    OOPHORECTOMY        Social History     Tobacco Use    Smoking status: Never    Smokeless tobacco: Never   Substance Use Topics    Alcohol use: No    Drug use: No     Family History   Problem Relation Name Age of Onset    Pacemaker/defibrilator Mother      Hypertension Mother      No Known Problems Father      Breast cancer Sister Half 58    Ovarian cancer Maternal Grandmother          Review of Systems:   Per HPI         Objective:        Vitals:    07/01/24 1426   BP: 123/75   Pulse: 78       Lab Results   Component Value Date    WBC 6.89 11/12/2020    HGB 12.0 11/12/2020    HCT 36.6 (L) 11/12/2020     11/12/2020    CHOL 237 (H) 11/14/2017    TRIG 78 11/14/2017    HDL 75 11/14/2017    ALT 11 11/12/2020    AST 19 11/12/2020     11/12/2020    K 4.0 11/12/2020     11/12/2020    CREATININE 0.7 11/12/2020    BUN 19 11/12/2020    CO2 29 11/12/2020    TSH 1.630 11/10/2020    INR 1.1 11/12/2020        ECHOCARDIOGRAM RESULTS  Results for orders placed during the hospital encounter of 08/22/23    Echo    Interpretation Summary    Left Ventricle: The left ventricle is normal in size. Mildly increased wall thickness. Normal wall motion. There is normal systolic function with a visually estimated ejection fraction of 60 - 65%. There is normal diastolic function.    Right Ventricle: Normal right ventricular cavity size. Wall thickness is normal. Right ventricle wall motion  is normal. Systolic function is normal.    Mitral Valve: There is mild regurgitation with a centrally directed jet.    IVC/SVC: Normal venous pressure at 3 mmHg.        CURRENT/PREVIOUS VISIT EKG  Results for orders placed or performed in visit on 05/08/24   IN OFFICE EKG 12-LEAD (to [place])    Collection Time: 05/08/24  7:57 AM   Result Value Ref Range    QRS Duration 82 ms    [facility] QTC Calculation 422 ms    Narrative    Test Reason : I48.0,    Vent. Rate : 067 BPM     Atrial Rate : 067 BPM     P-R Int : 198 ms          QRS Dur : 082 ms      QT Int : 400 ms        P-R-T Axes : 052 -39 041 degrees     QTc Int : 422 ms    Normal sinus rhythm  Left axis deviation  Low anterior forces  Abnormal ECG  When compared with ECG of 16-MAY-2023 08:50,  The axis Shifted left  Confirmed by Nikolas Petersen MD ([id number] on 5/23/2024 3:36:00 PM    Referred By:             Confirmed By:Nikolas Petersen MD     No valid procedures specified.   Results for orders placed during the hospital encounter of 02/16/20    Treadmill Stress Test    Interpretation Summary    The EKG portion of this study is negative for ischemia.    The patient reported no chest pain during the stress test.    Arrhythmias during stress: occasional PACs, atrial fibrillation.    The exercise capacity was above average.      Physical Exam:  CONSTITUTIONAL: No fever, no chills  HEENT: Normocephalic, atraumatic,pupils reactive to light                 NECK:  No JVD no carotid bruit  CVS: S1S2+, RRR   LUNGS: Clear  ABDOMEN: Soft, NT, BS+  EXTREMITIES: No cyanosis, edema  : No butler catheter  NEURO: AAO X 3  PSY: Normal affect      Medication List with Changes/Refills   Current Medications    AMLODIPINE (NORVASC) 5 MG TABLET    Take 1 tablet (5 mg total) by mouth 2 (two) times daily.    APIXABAN (ELIQUIS) 5 MG TAB    Take 1 tablet (5 mg total) by mouth 2 (two) times daily.    ASCORBIC ACID, VITAMIN C, (VITAMIN C) 100 MG TABLET    Vitamin C    ATORVASTATIN (LIPITOR) 20 MG TABLET    TAKE 1 TABLET(20 MG) BY MOUTH EVERY DAY    CETIRIZINE (ZYRTEC) 10 MG TABLET    Take 10 mg by mouth once daily.    CHOLECALCIFEROL, VITAMIN D3, 10 MCG (400 UNIT) CAP CAPSULE    Vitamin D3    CYCLOBENZAPRINE (FLEXERIL) 5 MG TABLET    Take 5 mg by mouth daily as needed.    FLUZONE HIGHDOSE QUAD 20-21  MCG/0.7 ML SYRG    ADM 0.7ML IM UTD    GABAPENTIN (NEURONTIN) 300 MG CAPSULE    Take 300 mg by mouth 3 (three) times daily.    LEVOCETIRIZINE (XYZAL) 5 MG TABLET    TK 1 T PO  QAM PRF SINUS ALLERGY OR DRIP    MAGNESIUM OXIDE (MAG-OX) 400 MG (241.3 MG  MAGNESIUM) TABLET    Take 1 tablet (400 mg total) by mouth once daily.    METOPROLOL SUCCINATE (TOPROL-XL) 25 MG 24 HR TABLET    Take 1 tablet (25 mg total) by mouth once daily.    PROPAFENONE (RYTHMOL SR) 225 MG CP12    Take 1 capsule (225 mg total) by mouth every 12 (twelve) hours.             Assessment:       1. Paroxysmal SVT (supraventricular tachycardia)    2. PAF (paroxysmal atrial fibrillation)    3. Essential hypertension    4. Mixed hyperlipidemia    5. Chronic right-sided low back pain with right-sided sciatica         Plan:     Problem List Items Addressed This Visit          Unprioritized    Essential hypertension    Current Assessment & Plan     BP stable and well controlled on current regimen. Continue amlodipine 5 mg po BID and metoprolol succinate 25 mg po daily.          Hyperlipidemia    Current Assessment & Plan     Recommend low fat low cholesterol diet and regular exercise (safely).          Paroxysmal SVT (supraventricular tachycardia) - Primary    Current Assessment & Plan     Asymptomatic. Followed by EP. Event monitor scheduled in November.          PAF (paroxysmal atrial fibrillation)    Current Assessment & Plan     Asymptomatic. Followed by EP. Event monitor scheduled in November.   Continue Eliquis 5 mg po BID and propafenone 225 mg po BID and metoprolol succinate 25 mg po daily.          Chronic back pain    Current Assessment & Plan     Considering a back procedure to see if there will be improvement in her pain and in her drop foot.             Follow up in about 6 months (around 1/1/2025).

## 2024-07-01 NOTE — ASSESSMENT & PLAN NOTE
Asymptomatic. Followed by EP. Event monitor scheduled in November.   Continue Eliquis 5 mg po BID and propafenone 225 mg po BID and metoprolol succinate 25 mg po daily.

## 2024-07-01 NOTE — ASSESSMENT & PLAN NOTE
BP stable and well controlled on current regimen. Continue amlodipine 5 mg po BID and metoprolol succinate 25 mg po daily.

## 2024-09-21 ENCOUNTER — HOSPITAL ENCOUNTER (EMERGENCY)
Facility: HOSPITAL | Age: 69
Discharge: HOME | End: 2024-09-21
Attending: STUDENT IN AN ORGANIZED HEALTH CARE EDUCATION/TRAINING PROGRAM
Payer: MEDICARE

## 2024-09-21 VITALS
DIASTOLIC BLOOD PRESSURE: 67 MMHG | BODY MASS INDEX: 24.59 KG/M2 | RESPIRATION RATE: 18 BRPM | TEMPERATURE: 98 F | SYSTOLIC BLOOD PRESSURE: 131 MMHG | HEART RATE: 80 BPM | HEIGHT: 64 IN | WEIGHT: 144 LBS | OXYGEN SATURATION: 99 %

## 2024-09-21 DIAGNOSIS — E86.0 DEHYDRATION: Primary | ICD-10-CM

## 2024-09-21 DIAGNOSIS — R53.83 FATIGUE, UNSPECIFIED TYPE: ICD-10-CM

## 2024-09-21 LAB
ALBUMIN SERPL BCP-MCNC: 4.6 G/DL (ref 3.5–5.2)
ALP SERPL-CCNC: 66 U/L (ref 55–135)
ALT SERPL W/O P-5'-P-CCNC: 9 U/L (ref 10–44)
ANION GAP SERPL CALC-SCNC: 7 MMOL/L (ref 8–16)
AST SERPL-CCNC: 15 U/L (ref 10–40)
BACTERIA #/AREA URNS HPF: ABNORMAL /HPF
BASOPHILS # BLD AUTO: 0.06 K/UL (ref 0–0.2)
BASOPHILS NFR BLD: 0.8 % (ref 0–1.9)
BILIRUB SERPL-MCNC: 0.5 MG/DL (ref 0.1–1)
BILIRUB UR QL STRIP: NEGATIVE
BUN SERPL-MCNC: 22 MG/DL (ref 8–23)
CALCIUM SERPL-MCNC: 9.7 MG/DL (ref 8.7–10.5)
CHLORIDE SERPL-SCNC: 103 MMOL/L (ref 95–110)
CLARITY UR: CLEAR
CO2 SERPL-SCNC: 28 MMOL/L (ref 23–29)
COLOR UR: YELLOW
CREAT SERPL-MCNC: 1.1 MG/DL (ref 0.5–1.4)
DIFFERENTIAL METHOD BLD: ABNORMAL
EOSINOPHIL # BLD AUTO: 0 K/UL (ref 0–0.5)
EOSINOPHIL NFR BLD: 0.5 % (ref 0–8)
ERYTHROCYTE [DISTWIDTH] IN BLOOD BY AUTOMATED COUNT: 13.1 % (ref 11.5–14.5)
EST. GFR  (NO RACE VARIABLE): 54.4 ML/MIN/1.73 M^2
GLUCOSE SERPL-MCNC: 95 MG/DL (ref 70–110)
GLUCOSE UR QL STRIP: NEGATIVE
HCT VFR BLD AUTO: 35.7 % (ref 37–48.5)
HGB BLD-MCNC: 11.8 G/DL (ref 12–16)
HGB UR QL STRIP: NEGATIVE
HYALINE CASTS #/AREA URNS LPF: 3 /LPF
IMM GRANULOCYTES # BLD AUTO: 0.02 K/UL (ref 0–0.04)
IMM GRANULOCYTES NFR BLD AUTO: 0.3 % (ref 0–0.5)
KETONES UR QL STRIP: ABNORMAL
LEUKOCYTE ESTERASE UR QL STRIP: ABNORMAL
LYMPHOCYTES # BLD AUTO: 3.5 K/UL (ref 1–4.8)
LYMPHOCYTES NFR BLD: 44.4 % (ref 18–48)
MCH RBC QN AUTO: 30.8 PG (ref 27–31)
MCHC RBC AUTO-ENTMCNC: 33.1 G/DL (ref 32–36)
MCV RBC AUTO: 93 FL (ref 82–98)
MICROSCOPIC COMMENT: ABNORMAL
MONOCYTES # BLD AUTO: 0.6 K/UL (ref 0.3–1)
MONOCYTES NFR BLD: 7.3 % (ref 4–15)
NEUTROPHILS # BLD AUTO: 3.7 K/UL (ref 1.8–7.7)
NEUTROPHILS NFR BLD: 46.7 % (ref 38–73)
NITRITE UR QL STRIP: NEGATIVE
NRBC BLD-RTO: 0 /100 WBC
PH UR STRIP: 6 [PH] (ref 5–8)
PLATELET # BLD AUTO: 293 K/UL (ref 150–450)
PMV BLD AUTO: 10.4 FL (ref 9.2–12.9)
POTASSIUM SERPL-SCNC: 4.1 MMOL/L (ref 3.5–5.1)
PROT SERPL-MCNC: 7.7 G/DL (ref 6–8.4)
PROT UR QL STRIP: ABNORMAL
RBC # BLD AUTO: 3.83 M/UL (ref 4–5.4)
RBC #/AREA URNS HPF: 1 /HPF (ref 0–4)
SODIUM SERPL-SCNC: 138 MMOL/L (ref 136–145)
SP GR UR STRIP: 1.02 (ref 1–1.03)
SQUAMOUS #/AREA URNS HPF: 1 /HPF
URN SPEC COLLECT METH UR: ABNORMAL
UROBILINOGEN UR STRIP-ACNC: NEGATIVE EU/DL
WBC # BLD AUTO: 7.8 K/UL (ref 3.9–12.7)
WBC #/AREA URNS HPF: 6 /HPF (ref 0–5)

## 2024-09-21 PROCEDURE — 85025 COMPLETE CBC W/AUTO DIFF WBC: CPT | Performed by: PHYSICIAN ASSISTANT

## 2024-09-21 PROCEDURE — 99283 EMERGENCY DEPT VISIT LOW MDM: CPT

## 2024-09-21 PROCEDURE — 80053 COMPREHEN METABOLIC PANEL: CPT | Performed by: PHYSICIAN ASSISTANT

## 2024-09-21 PROCEDURE — 81001 URINALYSIS AUTO W/SCOPE: CPT | Performed by: PHYSICIAN ASSISTANT

## 2024-09-21 RX ORDER — PROPAFENONE HYDROCHLORIDE 225 MG/1
225 CAPSULE, EXTENDED RELEASE ORAL EVERY 12 HOURS
Qty: 60 CAPSULE | Refills: 11 | Status: SHIPPED | OUTPATIENT
Start: 2024-09-21 | End: 2025-09-21

## 2024-09-21 NOTE — FIRST PROVIDER EVALUATION
Emergency Department TeleTriage Encounter Note      CHIEF COMPLAINT    Chief Complaint   Patient presents with    GEN WEAKNESS     X 1 MON    Nausea       VITAL SIGNS   Initial Vitals [09/21/24 1101]   BP Pulse Resp Temp SpO2   (!) 140/68 68 18 98.6 °F (37 °C) 100 %      MAP       --            ALLERGIES    Review of patient's allergies indicates:   Allergen Reactions    Bee sting [allergen ext-venom-honey bee] Swelling    Bee venom protein (honey bee)     Iodine     Iodine and iodide containing products Palpitations       PROVIDER TRIAGE NOTE  This is a teletriage evaluation of a 69 y.o. female presenting to the ED complaining of generalized weakness. Patient reports generalized weakness, nausea, and unintentional weight loss over one month. She denies pain.    Patient is alert and oriented. She speaks in complete sentences. She is sitting upright in the chair in no distress.     Initial orders will be placed and care will be transferred to an alternate provider when patient is roomed for a full evaluation. Any additional orders and the final disposition will be determined by that provider.         ORDERS  Labs Reviewed   CBC W/ AUTO DIFFERENTIAL   COMPREHENSIVE METABOLIC PANEL   URINALYSIS, REFLEX TO URINE CULTURE       ED Orders (720h ago, onward)      Start Ordered     Status Ordering Provider    09/21/24 1112 09/21/24 1111  CBC auto differential  STAT         Ordered DUSTINLEANNE.    09/21/24 1112 09/21/24 1111  Comprehensive metabolic panel  STAT         Ordered DUSTINLEANNE    09/21/24 1112 09/21/24 1111  Insert Saline lock IV  Once         Ordered LEANNE CHAN    09/21/24 1112 09/21/24 1111  Urinalysis, Reflex to Urine Culture Urine, Clean Catch  STAT         Ordered LEANNE CHAN              Virtual Visit Note: The provider triage portion of this emergency department evaluation and documentation was performed via [employer], a HIPAA-compliant telemedicine application, in concert with a  tele-presenter in the room. A face to face patient evaluation with one of my colleagues will occur once the patient is placed in an emergency department room.      DISCLAIMER: This note was prepared with [employer] voice recognition transcription software. Garbled syntax, mangled pronouns, and other bizarre constructions may be attributed to that software system.

## 2024-09-21 NOTE — ED PROVIDER NOTES
Encounter Date: 9/21/2024       History     Chief Complaint   Patient presents with    GEN WEAKNESS     X 1 MON    Nausea     HPI    69-year-old female with history of AFib, hypertension, hyperlipidemia, CAD presenting with 1 month of fatigue after changing her propafenone to a generic formulation.  Patient states that over the past month she has had decreased p.o. intake with nausea and decreased appetite as well as weight loss.  Denies any vomiting, abdominal pain, fever, chills, chest pain.  Patient denies any known blood loss.  States she is up-to-date on all cancer screening.  Patient has been adherent to medications until today when she held her propafenone and noted that her nausea has improved.    Review of patient's allergies indicates:   Allergen Reactions    Bee sting [allergen ext-venom-honey bee] Swelling    Bee venom protein (honey bee)     Iodine     Iodine and iodide containing products Palpitations     Past Medical History:   Diagnosis Date    Colon torsion     Coronary artery disease     CAD, pt states nild MI    Gastroparesis     GERD (gastroesophageal reflux disease)     Hyperlipidemia     Hypertension     Sciatica      Past Surgical History:   Procedure Laterality Date    ABDOMINAL SURGERY      APPENDECTOMY      COLON SURGERY      due to torsion, clipped adhesions    COLONOSCOPY  08/13/2014    JUNIOR.   One 1 mm polyp in the distal ascending colon.  Nonneoplastic colonic mucosa with reactive/regenerative changes.  Nodule (inverted stump?) at the appendiceal orifice.  Nonneoplastic colonic mucosa with prominent lymphoid aggregates.    COLONOSCOPY N/A 10/12/2020    Procedure: COLONOSCOPY;  Surgeon: Robb Kwong Jr., MD;  Location: [facility];  Service: Endoscopy;  Laterality: N/A;    ESOPHAGOGASTRODUODENOSCOPY      HYSTERECTOMY      INSERTION OF IMPLANTABLE LOOP RECORDER Left 11/12/2020    Procedure: Insertion, Implantable Loop Recorder;  Surgeon: Madan Benitez MD;  Location: [facility] CATH/EP  LAB;  Service: Cardiology;  Laterality: Left;    OOPHORECTOMY       Family History   Problem Relation Name Age of Onset    Pacemaker/defibrilator Mother      Hypertension Mother      No Known Problems Father      Breast cancer Sister Half 58    Ovarian cancer Maternal Grandmother       Social History     Tobacco Use    Smoking status: Never    Smokeless tobacco: Never   Substance Use Topics    Alcohol use: No    Drug use: No     Review of Systems    As noted above    Physical Exam     Initial Vitals [09/21/24 1101]   BP Pulse Resp Temp SpO2   (!) 140/68 68 18 98.6 °F (37 °C) 100 %      MAP       --         Physical Exam    Constitutional: She appears well-developed and well-nourished.   HENT:   Head: Normocephalic and atraumatic.   Moist mucous membranes   Eyes: Conjunctivae are normal.   Neck:   Normal range of motion.  Cardiovascular:  Normal rate and intact distal pulses.           No murmur heard.  Pulmonary/Chest: Breath sounds normal. No respiratory distress. She has no wheezes. She has no rales.   Abdominal: Abdomen is soft. She exhibits no distension. There is no abdominal tenderness. There is no rebound.   Musculoskeletal:         General: No edema. Normal range of motion.      Cervical back: Normal range of motion.     Neurological: She is alert and oriented to person, place, and time.   Skin: Skin is warm and dry. Capillary refill takes less than 2 seconds. No rash noted.   Mild presenting to left lower leg         ED Course   Procedures  Labs Reviewed   CBC W/ AUTO DIFFERENTIAL - Abnormal       Result Value    WBC 7.80      RBC 3.83 (*)     Hemoglobin 11.8 (*)     Hematocrit 35.7 (*)     MCV 93      MCH 30.8      MCHC 33.1      RDW 13.1      Platelets 293      MPV 10.4      Immature Granulocytes 0.3      Gran # (ANC) 3.7      Immature Grans (Abs) 0.02      Lymph # 3.5      Mono # 0.6      Eos # 0.0      Baso # 0.06      nRBC 0      Gran % 46.7      Lymph % 44.4      Mono % 7.3      Eosinophil % 0.5       Basophil % 0.8      Differential Method Automated     COMPREHENSIVE METABOLIC PANEL - Abnormal    Sodium 138      Potassium 4.1      Chloride 103      CO2 28      Glucose 95      BUN 22      Creatinine 1.1      Calcium 9.7      Total Protein 7.7      Albumin 4.6      Total Bilirubin 0.5      Alkaline Phosphatase 66      AST 15      ALT 9 (*)     eGFR 54.4 (*)     Anion Gap 7 (*)    URINALYSIS, REFLEX TO URINE CULTURE - Abnormal    Specimen UA Urine, Clean Catch      Color, UA Yellow      Appearance, UA Clear      pH, UA 6.0      Specific Gravity, UA 1.025      Protein, UA 1+ (*)     Glucose, UA Negative      Ketones, UA 2+ (*)     Bilirubin (UA) Negative      Occult Blood UA Negative      Nitrite, UA Negative      Urobilinogen, UA Negative      Leukocytes, UA 1+ (*)     Narrative:     Specimen Source->Urine   URINALYSIS MICROSCOPIC - Abnormal    RBC, UA 1      WBC, UA 6 (*)     Bacteria Few (*)     Squam Epithel, UA 1      Hyaline Casts, UA 3 (*)     Microscopic Comment SEE COMMENT      Narrative:     Specimen Source->Urine          Imaging Results    None          Medications - No data to display  Medical Decision Making  69-year-old presenting with fatigue x1 month after changing medications.  Vital signs here are stable.  Patient appears to have irregular rhythm on exam.  Exam otherwise unremarkable.  Differential includes electrolyte derangement, RADHA, UTI, dehydration, medication side effect.  Labs were reviewed that does show some ketones in the urine but otherwise no significant concern for infection.  CBC and CMP without any significant abnormality.  Advised patient to follow up with PCP for routine cancer screening and follow-up of fatigue.  We will try refilling her Rythmol medication to [facility] for her original formulation.  Feel she was safe for discharge at this time.  Return precautions discussed.    Poncho Jensen MD  Emergency Medicine      Amount and/or Complexity of Data Reviewed  Labs:   Decision-making details documented in ED Course.    Risk  Prescription drug management.               ED Course as of 09/21/24 1405   Sat Sep 21, 2024   1237 Bacteria, UA(!): Few [KH]   1238 Ketones, UA(!): 2+ [KH]   1238 Creatinine: 1.1 [KH]   1238 Sodium: 138 [KH]   1238 Potassium: 4.1 [KH]   1238 Hemoglobin(!): 11.8 [KH]   1238 Hematocrit(!): 35.7 [KH]      ED Course User Index  [KH] Poncho Jensen MD                           Clinical Impression:  Final diagnoses:  [E86.0] Dehydration (Primary)  [R53.83] Fatigue, unspecified type          ED Disposition Condition    Discharge Stable          ED Prescriptions       Medication Sig Dispense Start Date End Date Auth. Provider    propafenone (RYTHMOL SR) 225 MG Cp12 Take 1 capsule (225 mg total) by mouth every 12 (twelve) hours. 60 capsule 9/21/2024 9/21/2025 Poncho Jensen MD          Follow-up Information       Follow up With Specialties Details Why Contact Info Additional Information    [facility] - Emergency Dept Emergency Medicine  As needed, If symptoms worsen including worsening fatigue, lightheadedness, palpitations, any other concerns [address] [id number] [phone] 1st floor             Poncho Jensen MD  09/21/24 1041

## 2024-10-30 ENCOUNTER — OFFICE VISIT (OUTPATIENT)
Dept: CARDIOLOGY | Facility: CLINIC | Age: 69
End: 2024-10-30
Payer: MEDICARE

## 2024-10-30 VITALS
BODY MASS INDEX: 24.57 KG/M2 | DIASTOLIC BLOOD PRESSURE: 76 MMHG | HEART RATE: 68 BPM | SYSTOLIC BLOOD PRESSURE: 122 MMHG | WEIGHT: 143.94 LBS | RESPIRATION RATE: 16 BRPM | OXYGEN SATURATION: 98 % | HEIGHT: 64 IN

## 2024-10-30 DIAGNOSIS — I10 ESSENTIAL HYPERTENSION: ICD-10-CM

## 2024-10-30 DIAGNOSIS — E78.2 MIXED HYPERLIPIDEMIA: ICD-10-CM

## 2024-10-30 DIAGNOSIS — I48.0 PAF (PAROXYSMAL ATRIAL FIBRILLATION): ICD-10-CM

## 2024-10-30 DIAGNOSIS — I47.10 PAROXYSMAL SVT (SUPRAVENTRICULAR TACHYCARDIA): Primary | ICD-10-CM

## 2024-10-30 PROCEDURE — 99214 OFFICE O/P EST MOD 30 MIN: CPT | Mod: S$GLB,,, | Performed by: INTERNAL MEDICINE

## 2024-10-30 PROCEDURE — 1160F RVW MEDS BY RX/DR IN RCRD: CPT | Mod: CPTII,S$GLB,, | Performed by: INTERNAL MEDICINE

## 2024-10-30 PROCEDURE — 3008F BODY MASS INDEX DOCD: CPT | Mod: CPTII,S$GLB,, | Performed by: INTERNAL MEDICINE

## 2024-10-30 PROCEDURE — 1101F PT FALLS ASSESS-DOCD LE1/YR: CPT | Mod: CPTII,S$GLB,, | Performed by: INTERNAL MEDICINE

## 2024-10-30 PROCEDURE — 93010 ELECTROCARDIOGRAM REPORT: CPT | Mod: S$GLB,,, | Performed by: INTERNAL MEDICINE

## 2024-10-30 PROCEDURE — 3288F FALL RISK ASSESSMENT DOCD: CPT | Mod: CPTII,S$GLB,, | Performed by: INTERNAL MEDICINE

## 2024-10-30 PROCEDURE — 3074F SYST BP LT 130 MM HG: CPT | Mod: CPTII,S$GLB,, | Performed by: INTERNAL MEDICINE

## 2024-10-30 PROCEDURE — 1159F MED LIST DOCD IN RCRD: CPT | Mod: CPTII,S$GLB,, | Performed by: INTERNAL MEDICINE

## 2024-10-30 PROCEDURE — 3078F DIAST BP <80 MM HG: CPT | Mod: CPTII,S$GLB,, | Performed by: INTERNAL MEDICINE

## 2024-10-30 PROCEDURE — 93005 ELECTROCARDIOGRAM TRACING: CPT | Mod: S$GLB,,, | Performed by: INTERNAL MEDICINE

## 2024-10-30 PROCEDURE — 99999 PR PBB SHADOW E&M-EST. PATIENT-LVL IV: CPT | Mod: PBBFAC,,, | Performed by: INTERNAL MEDICINE

## 2024-10-31 LAB
OHS QRS DURATION: 82 MS
OHS QTC CALCULATION: 416 MS

## 2024-11-07 ENCOUNTER — HOSPITAL ENCOUNTER (OUTPATIENT)
Dept: CARDIOLOGY | Facility: CLINIC | Age: 69
Discharge: HOME | End: 2024-11-07
Attending: INTERNAL MEDICINE
Payer: MEDICARE

## 2024-11-07 DIAGNOSIS — I10 ESSENTIAL HYPERTENSION: ICD-10-CM

## 2024-11-07 DIAGNOSIS — E78.2 MIXED HYPERLIPIDEMIA: ICD-10-CM

## 2024-11-07 DIAGNOSIS — I48.0 PAF (PAROXYSMAL ATRIAL FIBRILLATION): ICD-10-CM

## 2024-12-03 ENCOUNTER — PATIENT MESSAGE (OUTPATIENT)
Dept: CARDIOLOGY | Facility: CLINIC | Age: 69
End: 2024-12-03
Payer: MEDICARE

## 2025-02-14 ENCOUNTER — TELEPHONE (OUTPATIENT)
Dept: CARDIOLOGY | Facility: CLINIC | Age: 70
End: 2025-02-14
Payer: MEDICARE

## 2025-02-18 ENCOUNTER — OFFICE VISIT (OUTPATIENT)
Dept: CARDIOLOGY | Facility: CLINIC | Age: 70
End: 2025-02-18
Payer: MEDICARE

## 2025-02-18 VITALS
OXYGEN SATURATION: 95 % | BODY MASS INDEX: 21.46 KG/M2 | SYSTOLIC BLOOD PRESSURE: 129 MMHG | DIASTOLIC BLOOD PRESSURE: 77 MMHG | HEART RATE: 92 BPM | WEIGHT: 125 LBS

## 2025-02-18 DIAGNOSIS — I48.0 PAF (PAROXYSMAL ATRIAL FIBRILLATION): ICD-10-CM

## 2025-02-18 DIAGNOSIS — Z01.818 PRE-OP EXAM: ICD-10-CM

## 2025-02-18 DIAGNOSIS — I10 ESSENTIAL HYPERTENSION: ICD-10-CM

## 2025-02-18 DIAGNOSIS — Z01.810 PREOP CARDIOVASCULAR EXAM: ICD-10-CM

## 2025-02-18 DIAGNOSIS — I47.10 PAROXYSMAL SVT (SUPRAVENTRICULAR TACHYCARDIA): Primary | ICD-10-CM

## 2025-02-18 PROCEDURE — 93005 ELECTROCARDIOGRAM TRACING: CPT | Mod: S$GLB,,, | Performed by: NURSE PRACTITIONER

## 2025-02-18 PROCEDURE — 99214 OFFICE O/P EST MOD 30 MIN: CPT | Mod: S$GLB,,, | Performed by: NURSE PRACTITIONER

## 2025-02-18 RX ORDER — TRETINOIN 0.25 MG/G
CREAM TOPICAL
COMMUNITY
Start: 2025-01-21

## 2025-02-18 RX ORDER — LORATADINE 10 MG/1
10 TABLET ORAL EVERY MORNING
COMMUNITY
Start: 2024-11-22

## 2025-02-18 NOTE — ASSESSMENT & PLAN NOTE
EKG shows sinus rhythm with no significant ST-T wave changes noted. She denies any chest pain or tightness. Denies any SOB. Cleared from cardiac perspective with low to moderate risks. Hold Eliquis for 3 days prior to procedure and resume when okay with surgeon.     Per MDCalc RCRI score is 0.

## 2025-02-18 NOTE — LETTER
...  [facility]  [address]  [address]  [place] [address]  Phone: [phone]  Fax: [phone] Date: 2025    Patient: Marga Pak                  MRN#:[id number]  : 1955  Referring Physician: Twan Le MD             Procedure: L4- L5 Laminectomy     Current Outpatient Medications   Medication Sig Dispense Refill    amLODIPine (NORVASC) 5 MG tablet Take 1 tablet (5 mg total) by mouth 2 (two) times daily. 180 tablet 1    apixaban (ELIQUIS) 5 mg Tab Take 1 tablet (5 mg total) by mouth 2 (two) times daily. 180 tablet 3    ascorbic acid, vitamin C, (VITAMIN C) 100 MG tablet Vitamin C      atorvastatin (LIPITOR) 20 MG tablet TAKE 1 TABLET(20 MG) BY MOUTH EVERY DAY 90 tablet 3    cetirizine (ZYRTEC) 10 MG tablet Take 10 mg by mouth once daily.      levocetirizine (XYZAL) 5 MG tablet TK 1 T PO  QAM PRF SINUS ALLERGY OR DRIP  6    loratadine (CLARITIN) 10 mg tablet Take 10 mg by mouth every morning.      magnesium oxide (MAG-OX) 400 mg (241.3 mg magnesium) tablet Take 1 tablet (400 mg total) by mouth once daily.  0    metoprolol succinate (TOPROL-XL) 25 MG 24 hr tablet Take 1 tablet (25 mg total) by mouth once daily. 90 tablet 1    propafenone (RYTHMOL SR) 225 MG Cp12 Take 1 capsule (225 mg total) by mouth every 12 (twelve) hours. 60 capsule 11    tretinoin (RETIN-A) 0.025 % cream SMARTSIG:sparingly Topical Every Night      cyclobenzaprine (FLEXERIL) 5 MG tablet Take 5 mg by mouth daily as needed. (Patient not taking: Reported on 2025)       No current facility-administered medications for this visit.       This patient has been assessed for risk factors for clearance of surgery with the following stipulations:    [] No Contraindications.      [x] Recommendations for ELIQUIS: Hold X 3 DAYS.    [x] Patient is MODERATE RISK    [x] Cleared for surgery    [] Not Cleared for surgery due to the following reasons:    If you have any  questions regarding the above, please contact my office at [phone]    Clearing Clinician:         JERRI Ngo  Department of Cardiology   [facility] [place] [place]

## 2025-02-18 NOTE — PROGRESS NOTES
Subjective:    Patient ID:  Marga Pak is a 70 y.o. female.  Chief Complaint   Patient presents with    Pre-op Exam       HPI:  Patient presents today for follow-up appointment and is requesting surgical clearance for L4/L5 laminectomy.  Patient has no complaints of chest pain, dizziness, shortness of breath, palpitations, or syncope.  Patient has been doing well and taking medications as ordered.  Denies any falls or head injuries.  Denies any blood in the stool or in the urine.        Review of patient's allergies indicates:   Allergen Reactions    Bee sting [allergen ext-venom-honey bee] Swelling    Bee venom protein (honey bee)     Iodine     Iodine and iodide containing products Palpitations       Past Medical History:   Diagnosis Date    Colon torsion     Coronary artery disease     CAD, pt states nild MI    Gastroparesis     GERD (gastroesophageal reflux disease)     Hyperlipidemia     Hypertension     Sciatica      Past Surgical History:   Procedure Laterality Date    ABDOMINAL SURGERY      APPENDECTOMY      COLON SURGERY      due to torsion, clipped adhesions    COLONOSCOPY  08/13/2014    JUNIOR.   One 1 mm polyp in the distal ascending colon.  Nonneoplastic colonic mucosa with reactive/regenerative changes.  Nodule (inverted stump?) at the appendiceal orifice.  Nonneoplastic colonic mucosa with prominent lymphoid aggregates.    COLONOSCOPY N/A 10/12/2020    Procedure: COLONOSCOPY;  Surgeon: Robb Kwong Jr., MD;  Location: [facility] ENDO;  Service: Endoscopy;  Laterality: N/A;    ESOPHAGOGASTRODUODENOSCOPY      HYSTERECTOMY      INSERTION OF IMPLANTABLE LOOP RECORDER Left 11/12/2020    Procedure: Insertion, Implantable Loop Recorder;  Surgeon: Madan Benitez MD;  Location: [facility] CATH/EP LAB;  Service: Cardiology;  Laterality: Left;    OOPHORECTOMY       Social History[1]  Family History   Problem Relation Name Age of Onset    Pacemaker/defibrilator Mother      Hypertension Mother      No Known  Problems Father      Breast cancer Sister Half 58    Ovarian cancer Maternal Grandmother          Review of Systems:   Per HPI         Objective:        Vitals:    02/18/25 1112   BP: 129/77   Pulse: 92       Lab Results   Component Value Date    WBC 7.80 09/21/2024    HGB 11.8 (L) 09/21/2024    HCT 35.7 (L) 09/21/2024     09/21/2024    CHOL 237 (H) 11/14/2017    TRIG 78 11/14/2017    HDL 75 11/14/2017    ALT 9 (L) 09/21/2024    AST 15 09/21/2024     09/21/2024    K 4.1 09/21/2024     09/21/2024    CREATININE 1.1 09/21/2024    BUN 22 09/21/2024    CO2 28 09/21/2024    TSH 1.630 11/10/2020    INR 1.1 11/12/2020        ECHOCARDIOGRAM RESULTS  Results for orders placed during the hospital encounter of 08/22/23    Echo    Interpretation Summary    Left Ventricle: The left ventricle is normal in size. Mildly increased wall thickness. Normal wall motion. There is normal systolic function with a visually estimated ejection fraction of 60 - 65%. There is normal diastolic function.    Right Ventricle: Normal right ventricular cavity size. Wall thickness is normal. Right ventricle wall motion  is normal. Systolic function is normal.    Mitral Valve: There is mild regurgitation with a centrally directed jet.    IVC/SVC: Normal venous pressure at 3 mmHg.        CURRENT/PREVIOUS VISIT EKG  Results for orders placed or performed in visit on 02/18/25   IN OFFICE EKG 12-LEAD (to [place])    Collection Time: 02/18/25 11:23 AM   Result Value Ref Range    QRS Duration 84 ms    [facility] QTC Calculation 420 ms    Narrative    Test Reason : I48.0,I47.10,Z01.818,    Vent. Rate :  77 BPM     Atrial Rate :  77 BPM     P-R Int : 210 ms          QRS Dur :  84 ms      QT Int : 372 ms       P-R-T Axes :  84  26  73 degrees    QTcB Int : 420 ms    Sinus rhythm with 1st degree A-V block  Septal infarct (cited on or before 16-May-2023)  Abnormal ECG  When compared with ECG of 30-Oct-2024 07:41,  No significant change was  found    Referred By:            Confirmed By:      No valid procedures specified.   Results for orders placed during the hospital encounter of 02/16/20    Treadmill Stress Test    Interpretation Summary    The EKG portion of this study is negative for ischemia.    The patient reported no chest pain during the stress test.    Arrhythmias during stress: occasional PACs, atrial fibrillation.    The exercise capacity was above average.      Physical Exam:  CONSTITUTIONAL: No fever, no chills  HEENT: Normocephalic, atraumatic,pupils reactive to light                 NECK:  No JVD no carotid bruit  CVS: S1S2+, RRR, no murmurs,   LUNGS: Clear  ABDOMEN: Soft, NT, BS+  EXTREMITIES: No cyanosis, edema  : No butler catheter  NEURO: AAO X 3  PSY: Normal affect      Medication List with Changes/Refills   Current Medications    AMLODIPINE (NORVASC) 5 MG TABLET    Take 1 tablet (5 mg total) by mouth 2 (two) times daily.    APIXABAN (ELIQUIS) 5 MG TAB    Take 1 tablet (5 mg total) by mouth 2 (two) times daily.    ASCORBIC ACID, VITAMIN C, (VITAMIN C) 100 MG TABLET    Vitamin C    ATORVASTATIN (LIPITOR) 20 MG TABLET    TAKE 1 TABLET(20 MG) BY MOUTH EVERY DAY    CETIRIZINE (ZYRTEC) 10 MG TABLET    Take 10 mg by mouth once daily.    CYCLOBENZAPRINE (FLEXERIL) 5 MG TABLET    Take 5 mg by mouth daily as needed.    LEVOCETIRIZINE (XYZAL) 5 MG TABLET    TK 1 T PO  QAM PRF SINUS ALLERGY OR DRIP    LORATADINE (CLARITIN) 10 MG TABLET    Take 10 mg by mouth every morning.    MAGNESIUM OXIDE (MAG-OX) 400 MG (241.3 MG MAGNESIUM) TABLET    Take 1 tablet (400 mg total) by mouth once daily.    METOPROLOL SUCCINATE (TOPROL-XL) 25 MG 24 HR TABLET    Take 1 tablet (25 mg total) by mouth once daily.    PROPAFENONE (RYTHMOL SR) 225 MG CP12    Take 1 capsule (225 mg total) by mouth every 12 (twelve) hours.    TRETINOIN (RETIN-A) 0.025 % CREAM    SMARTSIG:sparingly Topical Every Night   Discontinued Medications    CHOLECALCIFEROL, VITAMIN D3, 10 MCG  (400 UNIT) CAP CAPSULE    Vitamin D3    FLUZONE HIGHDOSE QUAD 20-21  MCG/0.7 ML SYRG    ADM 0.7ML IM UTD    GABAPENTIN (NEURONTIN) 300 MG CAPSULE    Take 300 mg by mouth 3 (three) times daily.             Assessment:       1. Paroxysmal SVT (supraventricular tachycardia)    2. PAF (paroxysmal atrial fibrillation)    3. Essential hypertension    4. Pre-op exam    5. Preop cardiovascular exam         Plan:     Problem List Items Addressed This Visit          Unprioritized    Essential hypertension    Current Assessment & Plan   BP well controlled. Continue amlodipine 5 mg po bid and metoprolol succinate 25 mg po daily.          Preop cardiovascular exam    Current Assessment & Plan   EKG shows sinus rhythm with no significant ST-T wave changes noted. She denies any chest pain or tightness. Denies any SOB. Cleared from cardiac perspective with low to moderate risks. Hold Eliquis for 3 days prior to procedure and resume when okay with surgeon.     Per MDCalc RCRI score is 0.          Paroxysmal SVT (supraventricular tachycardia) - Primary    Current Assessment & Plan   Followed by EP. Stable on Toprol and propafenone.          Relevant Orders    IN OFFICE EKG 12-LEAD (to [place])    PAF (paroxysmal atrial fibrillation)    Current Assessment & Plan   Asymptomatic. In SR at today's visit. Continue Eliquis 5 mg po BID. Continue propafenone 225 mg PO BID. Followed by EP.          Relevant Orders    IN OFFICE EKG 12-LEAD (to [place])     Other Visit Diagnoses         Pre-op exam        Relevant Orders    IN OFFICE EKG 12-LEAD (to [place])            Follow up in about 6 months (around 8/18/2025).          Deepika Robbins NP-C  Department of Cardiology   [facility] [place] [place]         [1]   Social History  Tobacco Use    Smoking status: Never    Smokeless tobacco: Never   Substance Use Topics    Alcohol use: No    Drug use: No

## 2025-02-18 NOTE — ASSESSMENT & PLAN NOTE
Asymptomatic. In SR at today's visit. Continue Eliquis 5 mg po BID. Continue propafenone 225 mg PO BID. Followed by EP.

## 2025-02-19 LAB
OHS QRS DURATION: 84 MS
OHS QTC CALCULATION: 420 MS

## 2025-04-30 ENCOUNTER — HOSPITAL ENCOUNTER (OUTPATIENT)
Dept: CARDIOLOGY | Facility: CLINIC | Age: 70
Discharge: HOME | End: 2025-04-30
Attending: INTERNAL MEDICINE
Payer: MEDICARE

## 2025-04-30 ENCOUNTER — TELEPHONE (OUTPATIENT)
Dept: CARDIOLOGY | Facility: CLINIC | Age: 70
End: 2025-04-30
Payer: MEDICARE

## 2025-04-30 DIAGNOSIS — I47.10 PAROXYSMAL SVT (SUPRAVENTRICULAR TACHYCARDIA): ICD-10-CM

## 2025-04-30 DIAGNOSIS — I48.0 PAF (PAROXYSMAL ATRIAL FIBRILLATION): ICD-10-CM

## 2025-04-30 NOTE — TELEPHONE ENCOUNTER
04/30/25    Patient came in today for a patch placement and requested that I ask Deepika Robbins if she could try another cholesterol patch.  I sent a secure msg to .    04/30/25    Spoke with the patient and verified directions to come off of the cholesterol med for 2 weeks and call and report.  Patient verbalized understanding.

## 2025-06-09 ENCOUNTER — RESULTS FOLLOW-UP (OUTPATIENT)
Dept: ELECTROPHYSIOLOGY | Facility: CLINIC | Age: 70
End: 2025-06-09
Payer: MEDICARE

## 2025-06-16 DIAGNOSIS — I48.0 PAF (PAROXYSMAL ATRIAL FIBRILLATION): ICD-10-CM

## 2025-08-06 ENCOUNTER — OFFICE VISIT (OUTPATIENT)
Dept: CARDIOLOGY | Facility: CLINIC | Age: 70
End: 2025-08-06
Payer: MEDICARE

## 2025-08-06 VITALS
DIASTOLIC BLOOD PRESSURE: 78 MMHG | HEIGHT: 64 IN | SYSTOLIC BLOOD PRESSURE: 138 MMHG | WEIGHT: 136.69 LBS | BODY MASS INDEX: 23.34 KG/M2 | HEART RATE: 70 BPM

## 2025-08-06 DIAGNOSIS — I10 ESSENTIAL HYPERTENSION: Primary | ICD-10-CM

## 2025-08-06 DIAGNOSIS — I48.0 PAF (PAROXYSMAL ATRIAL FIBRILLATION): ICD-10-CM

## 2025-08-06 DIAGNOSIS — I47.10 PAROXYSMAL SVT (SUPRAVENTRICULAR TACHYCARDIA): ICD-10-CM

## 2025-08-06 DIAGNOSIS — E78.2 MIXED HYPERLIPIDEMIA: ICD-10-CM

## 2025-08-06 PROCEDURE — 1126F AMNT PAIN NOTED NONE PRSNT: CPT | Mod: CPTII,S$GLB,, | Performed by: INTERNAL MEDICINE

## 2025-08-06 PROCEDURE — 3075F SYST BP GE 130 - 139MM HG: CPT | Mod: CPTII,S$GLB,, | Performed by: INTERNAL MEDICINE

## 2025-08-06 PROCEDURE — 99214 OFFICE O/P EST MOD 30 MIN: CPT | Mod: S$GLB,,, | Performed by: INTERNAL MEDICINE

## 2025-08-06 PROCEDURE — 1159F MED LIST DOCD IN RCRD: CPT | Mod: CPTII,S$GLB,, | Performed by: INTERNAL MEDICINE

## 2025-08-06 PROCEDURE — 3008F BODY MASS INDEX DOCD: CPT | Mod: CPTII,S$GLB,, | Performed by: INTERNAL MEDICINE

## 2025-08-06 PROCEDURE — 3288F FALL RISK ASSESSMENT DOCD: CPT | Mod: CPTII,S$GLB,, | Performed by: INTERNAL MEDICINE

## 2025-08-06 PROCEDURE — 1101F PT FALLS ASSESS-DOCD LE1/YR: CPT | Mod: CPTII,S$GLB,, | Performed by: INTERNAL MEDICINE

## 2025-08-06 PROCEDURE — 99999 PR PBB SHADOW E&M-EST. PATIENT-LVL III: CPT | Mod: PBBFAC,,, | Performed by: INTERNAL MEDICINE

## 2025-08-06 PROCEDURE — 3078F DIAST BP <80 MM HG: CPT | Mod: CPTII,S$GLB,, | Performed by: INTERNAL MEDICINE

## 2025-08-06 NOTE — PROGRESS NOTES
Subjective:    Patient ID:  Marga Pak is a 70 y.o. female patient here for evaluation Atrial Fibrillation, Hyperlipidemia, and Hypertension      History of Present Illness:   70-year-old female here for clinic follow up.  New patient to me.  She was following up with Dr. Madan Benitez in the nurse practitioner in the past.  Had spine surgery earlier this year and recovering.  Denies any exertional angina.  Denies any recent palpitations.  Had event monitor which did not show any sustained arrhythmia.  Follows up with electrophysiology.  Blood Pressure controlled at home.  Cholesterol controlled per patient.  Had blood work with the primary care        Review of patient's allergies indicates:   Allergen Reactions    Bee sting [allergen ext-venom-honey bee] Swelling    Bee venom protein (honey bee)     Iodine     Iodine and iodide containing products Palpitations       Past Medical History:   Diagnosis Date    Colon torsion     Coronary artery disease     CAD, pt states nild MI    Gastroparesis     GERD (gastroesophageal reflux disease)     Hyperlipidemia     Hypertension     Sciatica      Past Surgical History:   Procedure Laterality Date    ABDOMINAL SURGERY      APPENDECTOMY      COLON SURGERY      due to torsion, clipped adhesions    COLONOSCOPY  08/13/2014    JUNIOR.   One 1 mm polyp in the distal ascending colon.  Nonneoplastic colonic mucosa with reactive/regenerative changes.  Nodule (inverted stump?) at the appendiceal orifice.  Nonneoplastic colonic mucosa with prominent lymphoid aggregates.    COLONOSCOPY N/A 10/12/2020    Procedure: COLONOSCOPY;  Surgeon: Robb Kwong Jr., MD;  Location: [facility];  Service: Endoscopy;  Laterality: N/A;    ESOPHAGOGASTRODUODENOSCOPY      HYSTERECTOMY      INSERTION OF IMPLANTABLE LOOP RECORDER Left 11/12/2020    Procedure: Insertion, Implantable Loop Recorder;  Surgeon: Madan Benitez MD;  [facility] CATH[facility]EP LAB;  Service: Cardiology;  Laterality:  Left;    OOPHORECTOMY       Social History[1]     Review of Systems   Negative except as mentioned in HPI         Objective        Vitals:    08/06/25 1024   BP: 138/78   Pulse: 70       LIPIDS - LAST 2   Lab Results   Component Value Date    CHOL 237 (H) 11/14/2017    HDL 75 11/14/2017    LDLCALC 146.4 11/14/2017    TRIG 78 11/14/2017    CHOLHDL 31.6 11/14/2017       CBC - LAST 2  Lab Results   Component Value Date    WBC 7.80 09/21/2024    WBC 6.89 11/12/2020    RBC 3.83 (L) 09/21/2024    RBC 3.92 (L) 11/12/2020    HGB 11.8 (L) 09/21/2024    HGB 12.0 11/12/2020    HCT 35.7 (L) 09/21/2024    HCT 36.6 (L) 11/12/2020    MCV 93 09/21/2024    MCV 93 11/12/2020    MCH 30.8 09/21/2024    MCH 30.6 11/12/2020    MCHC 33.1 09/21/2024    MCHC 32.8 11/12/2020    RDW 13.1 09/21/2024    RDW 12.5 11/12/2020     09/21/2024     11/12/2020    MPV 10.4 09/21/2024    MPV 10.0 11/12/2020    GRAN 3.7 09/21/2024    GRAN 46.7 09/21/2024    LYMPH 3.5 09/21/2024    LYMPH 44.4 09/21/2024    MONO 0.6 09/21/2024    MONO 7.3 09/21/2024    BASO 0.06 09/21/2024    BASO 0.05 11/12/2020    NRBC 0 09/21/2024    NRBC 0 11/12/2020       CHEMISTRY & LIVER FUNCTION - LAST 2  Lab Results   Component Value Date     09/21/2024     11/12/2020    K 4.1 09/21/2024    K 4.0 11/12/2020     09/21/2024     11/12/2020    CO2 28 09/21/2024    CO2 29 11/12/2020    ANIONGAP 7 (L) 09/21/2024    ANIONGAP 8 11/12/2020    BUN 22 09/21/2024    BUN 19 11/12/2020    CREATININE 1.1 09/21/2024    CREATININE 0.7 11/12/2020    GLU 95 09/21/2024    GLU 98 11/12/2020    CALCIUM 9.7 09/21/2024    CALCIUM 9.4 11/12/2020    MG 2.0 11/10/2020    MG 1.8 02/16/2020    ALBUMIN 4.6 09/21/2024    ALBUMIN 4.0 11/12/2020    PROT 7.7 09/21/2024    PROT 6.9 11/12/2020    ALKPHOS 66 09/21/2024    ALKPHOS 57 11/12/2020    ALT 9 (L) 09/21/2024    ALT 11 11/12/2020    AST 15 09/21/2024    AST 19 11/12/2020    BILITOT 0.5 09/21/2024    BILITOT 0.9 11/12/2020         CARDIAC PROFILE - LAST 2  Lab Results   Component Value Date    BNP 87 11/10/2020    BNP 87 11/10/2020     12/20/2009    CPKMB 2.3 12/20/2009    TROPONINI <0.030 11/11/2020    TROPONINI <0.030 11/10/2020        COAGULATION - LAST 2  Lab Results   Component Value Date    LABPT 13.2 11/12/2020    LABPT 14.2 11/10/2020    INR 1.1 11/12/2020    INR 1.2 11/10/2020    APTT 27.8 11/12/2020       ENDOCRINE & PSA - LAST 2  Lab Results   Component Value Date    TSH 1.630 11/10/2020    TSH 1.046 02/16/2020        ECHOCARDIOGRAM RESULTS  Results for orders placed during the hospital encounter of 08/22/23    Echo    Interpretation Summary    Left Ventricle: The left ventricle is normal in size. Mildly increased wall thickness. Normal wall motion. There is normal systolic function with a visually estimated ejection fraction of 60 - 65%. There is normal diastolic function.    Right Ventricle: Normal right ventricular cavity size. Wall thickness is normal. Right ventricle wall motion  is normal. Systolic function is normal.    Mitral Valve: There is mild regurgitation with a centrally directed jet.    IVC/SVC: Normal venous pressure at 3 mmHg.      CURRENT/PREVIOUS VISIT EKG  Results for orders placed or performed in visit on 02/18/25   IN OFFICE EKG 12-LEAD (to [place])    Collection Time: 02/18/25 11:23 AM   Result Value Ref Range    QRS Duration 84 ms    [facility] QTC Calculation 420 ms    Narrative    Test Reason : I48.0,I47.10,Z01.818,    Vent. Rate :  77 BPM     Atrial Rate :  77 BPM     P-R Int : 210 ms          QRS Dur :  84 ms      QT Int : 372 ms       P-R-T Axes :  84  26  73 degrees    QTcB Int : 420 ms    Sinus rhythm with 1st degree A-V block  Septal infarct (cited on or before 16-May-2023)  Abnormal ECG  Confirmed by Eli Mahoney ([id number]) on 2/19/2025 5:33:54 PM    Referred By:            Confirmed By: Eli Mahoney     No valid procedures specified.   Results for orders placed during the hospital  encounter of 02/16/20    Treadmill Stress Test    Interpretation Summary    The EKG portion of this study is negative for ischemia.    The patient reported no chest pain during the stress test.    Arrhythmias during stress: occasional PACs, atrial fibrillation.    The exercise capacity was above average.    No valid procedures specified.          PREVIOUS STRESS TEST              PREVIOUS ANGIOGRAM        PHYSICAL EXAM    CONSTITUTIONAL: Well built, well nourished in no apparent distress  HEENT: No pallor  NECK: no JVD  LUNGS: CTA b/l  HEART: regular rate and rhythm, S1, S2 normal, no murmur   ABDOMEN:  Nondistended  EXTREMITIES: No edema  NEURO: AAO X 3   SKIN:  No rash  Psych:  Normal affect    I HAVE REVIEWED :    The vital signs, nurses notes, and all the pertinent recent radiology studies, cardiovascular studies and labs.  I have independently reviewed the patient's most recent EKG.        Current Outpatient Medications   Medication Instructions    amLODIPine (NORVASC) 5 mg, Oral, 2 times daily    apixaban (ELIQUIS) 5 mg, Oral, 2 times daily    ascorbic acid, vitamin C, (VITAMIN C) 100 MG tablet Vitamin C    atorvastatin (LIPITOR) 20 MG tablet TAKE 1 TABLET(20 MG) BY MOUTH EVERY DAY    cetirizine (ZYRTEC) 10 mg, Daily    cyclobenzaprine (FLEXERIL) 5 mg, Daily PRN    levocetirizine (XYZAL) 5 MG tablet TK 1 T PO  QAM PRF SINUS ALLERGY OR DRIP    loratadine (CLARITIN) 10 mg, Every morning    magnesium oxide (MAG-OX) 400 mg, Oral, Daily    metoprolol succinate (TOPROL-XL) 25 mg, Oral, Daily    propafenone (RYTHMOL SR) 225 mg, Oral, Every 12 hours    tretinoin (RETIN-A) 0.025 % cream SMARTSIG:sparingly Topical Every Night        Assessment and Plan       Essential hypertension    PAF (paroxysmal atrial fibrillation)    Paroxysmal SVT (supraventricular tachycardia)    Mixed hyperlipidemia       No anginal symptoms.  No sustained arrhythmia on recent event monitor   Continue current medical therapy with amlodipine,  Eliquis, atorvastatin, metoprolol, propafenone  Patient reports that occasionally she briefly stops breathing at nighttime which was happening for many years.  Denies snoring.  Referred for sleep study  Follow up with the EP    Follow up in about 6 months (around 2/6/2026).          [1]   Social History  Tobacco Use    Smoking status: Never    Smokeless tobacco: Never   Substance Use Topics    Alcohol use: No    Drug use: No

## 2025-08-28 ENCOUNTER — PATIENT MESSAGE (OUTPATIENT)
Dept: CARDIOLOGY | Facility: CLINIC | Age: 70
End: 2025-08-28
Payer: MEDICARE